# Patient Record
Sex: MALE | Race: WHITE | NOT HISPANIC OR LATINO | Employment: OTHER | URBAN - METROPOLITAN AREA
[De-identification: names, ages, dates, MRNs, and addresses within clinical notes are randomized per-mention and may not be internally consistent; named-entity substitution may affect disease eponyms.]

---

## 2017-01-10 DIAGNOSIS — D64.9 ANEMIA, UNSPECIFIED: ICD-10-CM

## 2017-01-10 DIAGNOSIS — C95.10 CHRONIC LEUKEMIA OF UNSPECIFIED CELL TYPE NOT HAVING ACHIEVED REMISSION (HCC): Primary | ICD-10-CM

## 2017-01-10 RX ORDER — WARFARIN SODIUM 2.5 MG/1
TABLET ORAL
Qty: 40 TABLET | Refills: 1 | Status: SHIPPED | OUTPATIENT
Start: 2017-01-10 | End: 2017-03-14

## 2017-01-25 ENCOUNTER — HOSPITAL ENCOUNTER (OUTPATIENT)
Dept: CARDIOLOGY | Facility: HOSPITAL | Age: 82
Setting detail: RECURRING SERIES
Discharge: HOME | End: 2017-01-25

## 2017-01-25 PROCEDURE — 36416 COLLJ CAPILLARY BLOOD SPEC: CPT | Performed by: INTERNAL MEDICINE

## 2017-01-25 PROCEDURE — 85610 PROTHROMBIN TIME: CPT | Performed by: INTERNAL MEDICINE

## 2017-02-06 ENCOUNTER — APPOINTMENT (OUTPATIENT)
Dept: ONCOLOGY | Facility: CLINIC | Age: 82
End: 2017-02-06

## 2017-02-06 ENCOUNTER — APPOINTMENT (OUTPATIENT)
Dept: LAB | Facility: HOSPITAL | Age: 82
End: 2017-02-06

## 2017-02-07 RX ORDER — TRAMADOL HYDROCHLORIDE 50 MG/1
TABLET ORAL
Qty: 120 TABLET | Refills: 1 | OUTPATIENT
Start: 2017-02-07 | End: 2017-04-30 | Stop reason: SDUPTHER

## 2017-02-14 ENCOUNTER — LAB (OUTPATIENT)
Dept: LAB | Facility: HOSPITAL | Age: 82
End: 2017-02-14

## 2017-02-14 ENCOUNTER — OFFICE VISIT (OUTPATIENT)
Dept: ONCOLOGY | Facility: CLINIC | Age: 82
End: 2017-02-14

## 2017-02-14 VITALS
TEMPERATURE: 97.5 F | DIASTOLIC BLOOD PRESSURE: 92 MMHG | BODY MASS INDEX: 27.35 KG/M2 | SYSTOLIC BLOOD PRESSURE: 170 MMHG | RESPIRATION RATE: 14 BRPM | WEIGHT: 170.2 LBS | OXYGEN SATURATION: 97 % | HEIGHT: 66 IN | HEART RATE: 67 BPM

## 2017-02-14 DIAGNOSIS — D64.9 ANEMIA, UNSPECIFIED: ICD-10-CM

## 2017-02-14 DIAGNOSIS — C95.10 CHRONIC LEUKEMIA OF UNSPECIFIED CELL TYPE NOT HAVING ACHIEVED REMISSION (HCC): Primary | ICD-10-CM

## 2017-02-14 DIAGNOSIS — D69.6 THROMBOCYTOPENIA (HCC): ICD-10-CM

## 2017-02-14 DIAGNOSIS — C95.10 CHRONIC LEUKEMIA OF UNSPECIFIED CELL TYPE NOT HAVING ACHIEVED REMISSION (HCC): ICD-10-CM

## 2017-02-14 DIAGNOSIS — N18.30 ANEMIA OF CHRONIC RENAL FAILURE, STAGE 3 (MODERATE) (HCC): ICD-10-CM

## 2017-02-14 DIAGNOSIS — E53.8 VITAMIN B12 DEFICIENCY: ICD-10-CM

## 2017-02-14 DIAGNOSIS — D63.1 ANEMIA OF CHRONIC RENAL FAILURE, STAGE 3 (MODERATE) (HCC): ICD-10-CM

## 2017-02-14 LAB
BASOPHILS # BLD AUTO: 0.05 10*3/MM3 (ref 0–0.1)
BASOPHILS NFR BLD AUTO: 0.5 % (ref 0–1.1)
DEPRECATED RDW RBC AUTO: 48.6 FL (ref 37–49)
EOSINOPHIL # BLD AUTO: 0.26 10*3/MM3 (ref 0–0.36)
EOSINOPHIL NFR BLD AUTO: 2.8 % (ref 1–5)
ERYTHROCYTE [DISTWIDTH] IN BLOOD BY AUTOMATED COUNT: 13.5 % (ref 11.7–14.5)
FERRITIN SERPL-MCNC: 431.3 NG/ML (ref 30–400)
FOLATE SERPL-MCNC: 10.2 NG/ML
HCT VFR BLD AUTO: 38.3 % (ref 40–49)
HGB BLD-MCNC: 12.8 G/DL (ref 13.5–16.5)
IMM GRANULOCYTES # BLD: 0.03 10*3/MM3 (ref 0–0.03)
IMM GRANULOCYTES NFR BLD: 0.3 % (ref 0–0.5)
IRON 24H UR-MRATE: 96 MCG/DL (ref 59–158)
IRON SATN MFR SERPL: 32 % (ref 14–48)
LYMPHOCYTES # BLD AUTO: 2.14 10*3/MM3 (ref 1–3.5)
LYMPHOCYTES NFR BLD AUTO: 22.9 % (ref 20–49)
MCH RBC QN AUTO: 32.4 PG (ref 27–33)
MCHC RBC AUTO-ENTMCNC: 33.4 G/DL (ref 32–35)
MCV RBC AUTO: 97 FL (ref 83–97)
MONOCYTES # BLD AUTO: 0.73 10*3/MM3 (ref 0.25–0.8)
MONOCYTES NFR BLD AUTO: 7.8 % (ref 4–12)
NEUTROPHILS # BLD AUTO: 6.12 10*3/MM3 (ref 1.5–7)
NEUTROPHILS NFR BLD AUTO: 65.7 % (ref 39–75)
NRBC BLD MANUAL-RTO: 0 /100 WBC (ref 0–0)
PLATELET # BLD AUTO: 134 10*3/MM3 (ref 150–375)
PMV BLD AUTO: 10 FL (ref 8.9–12.1)
RBC # BLD AUTO: 3.95 10*6/MM3 (ref 4.3–5.5)
TIBC SERPL-MCNC: 301 MCG/DL (ref 249–505)
TRANSFERRIN SERPL-MCNC: 215 MG/DL (ref 200–360)
VIT B12 BLD-MCNC: 372 PG/ML (ref 250–999)
WBC NRBC COR # BLD: 9.33 10*3/MM3 (ref 4–10)

## 2017-02-14 PROCEDURE — 82728 ASSAY OF FERRITIN: CPT | Performed by: INTERNAL MEDICINE

## 2017-02-14 PROCEDURE — 82746 ASSAY OF FOLIC ACID SERUM: CPT | Performed by: INTERNAL MEDICINE

## 2017-02-14 PROCEDURE — 82607 VITAMIN B-12: CPT | Performed by: INTERNAL MEDICINE

## 2017-02-14 PROCEDURE — 85025 COMPLETE CBC W/AUTO DIFF WBC: CPT | Performed by: INTERNAL MEDICINE

## 2017-02-14 PROCEDURE — 99213 OFFICE O/P EST LOW 20 MIN: CPT | Performed by: INTERNAL MEDICINE

## 2017-02-14 PROCEDURE — 83540 ASSAY OF IRON: CPT | Performed by: INTERNAL MEDICINE

## 2017-02-14 PROCEDURE — 84466 ASSAY OF TRANSFERRIN: CPT | Performed by: INTERNAL MEDICINE

## 2017-02-14 NOTE — PROGRESS NOTES
REASON FOR FOLLOWUP:   1. Early stage chronic lymphocytic leukemia, on observation.   2. The patient is here for evaluation on 04/02/2015, because of outside laboratory study on 03/27/2015 reported elevated serum ferritin level at 503.5 ng/mL and iron saturation 26%. The patient tested negative for HFE gene mutation in April 2015.   3. Mild anemia and borderline platelets.   4. Laboratory study on 01/18/2016 reported slightly improved ferritin level at 351, normal iron saturation 25%.       HISTORY OF PRESENT ILLNESS: Mr. Ventura is an 86-year-old  male returning today for annual reevaluation. The patient reports he is at baseline condition.  Performance status is ECOG 1.  Denies any fainting, lightheadedness, etc. Denies any bleeding issues. No fever, sweating or chills. No recurrent infection.       Laboratory study today, reported improved stable CBC showed hemoglobin at 12.8, with MCV 97, platelets 134,000, WBC 9330, including neutrophil count 6120, lymphocytes 2140, monocytes 730.       PAST MEDICAL HISTORY:   1.  Hypertension  2.  Hyperlipidemia.  3.  gastroesophageal reflex disease.  4.  Chronic renal insufficiency.  Stage III.   5.  Atrial fibrillation on Coumadin.   6.  Sick sinus syndrome post- pacemaker placement    PAST SURGICAL HISTORY:   1. Cholecystectomy in 1983.   2. Umbilical hernia repair performed laparoscopically by Dr. Garcia in 2006.   3. Pacemaker placement in August 2015       HEMATOLOGIC/ONCOLOGIC HISTORY: History from previous dates can be found in the separate document.       The patient was retested on 01/18/2016. Laboratory study showed hemoglobin 13.1, MCV 98.8, MCHC 32.9 , platelets 142,000, WBC 9520 including neutrophils 6460, lymphocytes 1890 and monocytes 820. Chemistry labs reported normal liver function panel, creatinine baseline 1.2 and normal electrolytes except potassium was 5.2. Glucose was 120. Iron panel showed improved ferritin at 351, iron saturation 25.3%, B12  "level was 454 pg/mL and folic acid 10.5 ng/mL. The patient was examined on 2016 and no palpable adenopathy. The patient denies fever, sweating, etc.       MEDICATIONS: The current medication list was reviewed with the patient and updated in the EMR this date per the medical assistant. Medication dosages and frequencies were confirmed to be accurate.       ALLERGIES: No known drug allergies.       SOCIAL HISTORY: . Retired from sales for [employer]. Quit smoking over 30 years ago. Daily alcohol daily, reporting 28 drinks a week. He stays fairly active for his age walking and golfing.       FAMILY HISTORY: Three uncles on his father's side developed liver cancer at ages 55, 74 and 79.       REVIEW OF SYSTEMS:    PAIN: See VITAL SIGNS below.   GENERAL: No change in appetite or weight, no fevers, chills or sweats.   SKIN: No rashes or nonhealing lesions.   HEME/LYMPH: See hematologic history.   EYES: No vision changes or diplopia.   ENT: No tinnitus, hearing loss, gum bleeding, epistaxis, hoarseness or dysphagia.   RESPIRATORY: No cough, shortness of breath, hemoptysis or wheezing.   CVS: No chest pain, palpitations, orthopnea, dyspnea on exertion or PND.   GI: No abdominal pain, nausea, vomiting, constipation, diarrhea, melena or hematochezia.   : No lower tract obstructive symptoms, no dysuria or hematuria.    MUSCULOSKELETAL: The patient has chronic joint pain.   NEUROLOGICAL:  No dizziness, global weakness, loss of consciousness or seizures.   PSYCHIATRIC:  No increased nervousness, mood changes or depression.       VITAL SIGNS:   Vitals:    17 1338   BP: 170/92   Pulse: 67   Resp: 14   Temp: 97.5 °F (36.4 °C)   TempSrc: Oral   SpO2: 97%   Weight: 170 lb 3.2 oz (77.2 kg)   Height: 65.95\" (167.5 cm)  Comment: new height   PainSc: 0-No pain   ECO       PHYSICAL EXAMINATION:   GENERAL:  Well-developed elderly gentleman fitting with his age, not in acute distress.   SKIN:  Warm and dry " without rashes, purpura or petechiae.   HEAD:  Normocephalic.   EYES:  Pupils equal, round and reactive to light.  EOMs intact.  Conjunctivae normal.   EARS:  Hearing intact.   NOSE:  No excoriations or nasal discharge.   MOUTH:  Tongue is well papillated; no stomatitis or ulcers.  Lips normal.   THROAT:  Oropharynx without lesions or exudates.   NECK:  Supple with good range of motion; no thyromegaly or masses.   LYMPHATICS:  No cervical, supraclavicular, axillary or inguinal adenopathy.   CHEST:  Lungs breathing sounds has decreased but no wheezing, no crackles.   CARDIAC:  Irregular rhythm rate controled without murmurs, rubs or gallops.   ABDOMEN:  Soft, nontender with no organomegaly or masses. Bowel sounds present.   EXTREMITIES:  No clubbing, cyanosis or edema.   NEURO:  No focal deficits.       LABORATORY DATA:  Lab Results   Component Value Date    WBC 9.33 02/14/2017    HGB 12.8 (L) 02/14/2017    HCT 38.3 (L) 02/14/2017    MCV 97.0 02/14/2017     (L) 02/14/2017     Lab Results   Component Value Date    NEUTROABS 6.12 02/14/2017     Lymphocytes 2140.     Lab Results   Component Value Date    GLUCOSE 103 (H) 08/28/2016    BUN 24 (H) 10/17/2016    CREATININE 1.28 (H) 10/17/2016    EGFRIFNONA 53 (L) 10/17/2016    EGFRIFAFRI 65 10/17/2016    BCR 18.8 10/17/2016    CO2 32.0 (H) 10/17/2016    CALCIUM 9.7 10/17/2016    PROTENTOTREF 6.9 10/17/2016    ALBUMIN 4.40 10/17/2016    LABIL2 1.8 10/17/2016    AST 22 10/17/2016    ALT 16 10/17/2016       Lab Results   Component Value Date    IRON 96 02/14/2017    TIBC 301 02/14/2017    FERRITIN 431.30 (H) 02/14/2017       Iron saturation 32%    ASSESSMENT:   1. Chronic lymphocytic leukemia, stage 0. No evidence for disease progress by laboratory study and physical examination.   2. Elevated serum ferritin level and tested negative for HFE gene mutation.  Continues to have elevated ferritin.  This could be inflammatory.  His iron saturation is normal 32%.  Considering  his anemia, there is no need of for phlebotomy.   3. Mild anemia, probably multifactorial, in the setting of stage III chronic renal insufficiency, and inflammatory condition.  Hemoglobin is relatively stable, asymptomatic. Continue followup.   4. Mild thrombocytopenia, with some fluctuation.  Patient is asymptomatic.   5. Suboptimal vitamin B12 level,  repeated level is pending. Suspect the patient may have functional B12 deficiency. Recommend the patient to buy over-the-counter vitamin B12, 1000 mcg daily.       PLAN: I will bring the patient back in 12 months for reevaluation.         KAIA CARMONA M.D. , Ph.D.         cc:  NARINDER Schaefer       Addendum:   Lab Results   Component Value Date    FOLATE 10.20 02/14/2017     Lab Results   Component Value Date    CGPMXKNP72 372 02/14/2017     Laboratory results came back, he has worsening vitamin B12 level.  I called and left message for him asking him to take oral vitamin B12 supplementation use 7929-5946 MCG daily.      KAIA CARMONA M.D. , Ph.D.   02/16/17       Dragon disclaimer:  Much of this encounter note is an electronic transcription/translation of spoken language to printed text. The electronic translation of spoken language may permit erroneous, or at times, nonsensical words or phrases to be inadvertently transcribed; Although I have reviewed the note for such errors, some may still exist.

## 2017-02-16 PROBLEM — D63.1 ANEMIA OF CHRONIC RENAL FAILURE, STAGE 3 (MODERATE) (HCC): Status: ACTIVE | Noted: 2017-02-16

## 2017-02-16 PROBLEM — E53.8 VITAMIN B12 DEFICIENCY: Status: ACTIVE | Noted: 2017-02-16

## 2017-02-16 PROBLEM — D69.6 THROMBOCYTOPENIA (HCC): Status: ACTIVE | Noted: 2017-02-16

## 2017-02-16 PROBLEM — N18.30 ANEMIA OF CHRONIC RENAL FAILURE, STAGE 3 (MODERATE) (HCC): Status: ACTIVE | Noted: 2017-02-16

## 2017-02-23 ENCOUNTER — HOSPITAL ENCOUNTER (OUTPATIENT)
Dept: CARDIOLOGY | Facility: HOSPITAL | Age: 82
Setting detail: RECURRING SERIES
Discharge: HOME | End: 2017-02-23

## 2017-02-23 PROCEDURE — 36416 COLLJ CAPILLARY BLOOD SPEC: CPT

## 2017-02-23 PROCEDURE — 85610 PROTHROMBIN TIME: CPT

## 2017-03-14 ENCOUNTER — OFFICE VISIT (OUTPATIENT)
Dept: CARDIOLOGY | Facility: CLINIC | Age: 82
End: 2017-03-14

## 2017-03-14 ENCOUNTER — CLINICAL SUPPORT NO REQUIREMENTS (OUTPATIENT)
Dept: CARDIOLOGY | Facility: CLINIC | Age: 82
End: 2017-03-14

## 2017-03-14 VITALS
WEIGHT: 172 LBS | SYSTOLIC BLOOD PRESSURE: 142 MMHG | HEIGHT: 67 IN | BODY MASS INDEX: 27 KG/M2 | DIASTOLIC BLOOD PRESSURE: 80 MMHG | HEART RATE: 62 BPM

## 2017-03-14 DIAGNOSIS — I25.10 CORONARY ARTERY DISEASE INVOLVING NATIVE CORONARY ARTERY OF NATIVE HEART WITHOUT ANGINA PECTORIS: Primary | ICD-10-CM

## 2017-03-14 DIAGNOSIS — I10 ESSENTIAL HYPERTENSION: ICD-10-CM

## 2017-03-14 DIAGNOSIS — I48.20 CHRONIC ATRIAL FIBRILLATION (HCC): Primary | ICD-10-CM

## 2017-03-14 DIAGNOSIS — C95.10 CHRONIC LEUKEMIA OF UNSPECIFIED CELL TYPE NOT HAVING ACHIEVED REMISSION (HCC): ICD-10-CM

## 2017-03-14 DIAGNOSIS — I48.19 PERSISTENT ATRIAL FIBRILLATION (HCC): ICD-10-CM

## 2017-03-14 PROCEDURE — 93288 INTERROG EVL PM/LDLS PM IP: CPT | Performed by: INTERNAL MEDICINE

## 2017-03-14 PROCEDURE — 93000 ELECTROCARDIOGRAM COMPLETE: CPT | Performed by: INTERNAL MEDICINE

## 2017-03-14 PROCEDURE — 99214 OFFICE O/P EST MOD 30 MIN: CPT | Performed by: INTERNAL MEDICINE

## 2017-03-14 NOTE — PROGRESS NOTES
Date of Office Visit: 2017  Encounter Provider: Nidhi Juarez MD  Place of Service: [facility] CARDIOLOGY  Patient Name: Harry Ventura  :1930    Chief complaint      History of Present Illness  Patient is an 86-year-old gentleman with history of hypertension, hyperlipidemia, deep venous thrombosis, chronic lymphocytic leukemia, atrial fibrillation with sick sinus syndrome and pacemaker placement.  He also has a history of pulmonary hypertension.  In  he was admitted with medical noncompliance and dietary noncompliance and heart failure.  He was noted to have pulmonary hypertension however by 2015 this had progressed with an RV systolic pressure of 67 mmHg in the setting of diastolic dysfunction.  In 2015 he developed bradycardia and had a pacemaker placed.  By 2016.  Normal left dripper size and systolic function with mild aortic regurgitation mild to moderate tricuspid regurgitation with an RV systolic pressure 68 mmHg.  Right heart catheterization revealed significant diastolic dysfunction and elevated biventricular pressures with mild pulmonary hypertension and mild coronary artery disease.  Diuretics were were felt to be treatment of choice.    Since last visit he has chronic dyspnea on exertion is unchanged but otherwise feels relatively well he is slightly less active.  But he does ambulate around his home and go shopping.  He denies any palpitations chest pain syncope near syncope.  His blood pressure home he believes his as it is today.  He believes is to see Dr. Grace in the next several weeks.  He has not had any falls and remains on Coumadin.  He is also taking aspirin.  When he was here at the last visit in September his blood pressure was quite high.  I have started him on lisinopril though he did not start this.    Past Medical History   Diagnosis Date   • Accelerated essential hypertension    • Anemia      Mild   • Atrial fibrillation    •  CAP (community acquired pneumonia)    • CHF (congestive heart failure)    • Chronic anticoagulation    • Chronic diastolic CHF (congestive heart failure)    • CLL (chronic lymphocytic leukemia)      Stage 0   • Coronary artery disease    • ED (erectile dysfunction) of non-organic origin    • Former smoker    • Gait instability    • GERD (gastroesophageal reflux disease)    • Glucose intolerance (malabsorption)    • H/O Thrombocytopenia      Mild   • Hematuria    • History of EKG 08/28/2016   • Hypercholesterolemia    • Hypertension      Pulmonary   • Hypogonadism male    • Insomnia    • Iron overload    • Low back pain    • Macrocytosis    • Personal history of CLL (chronic lymphocytic leukemia)    • Personal history of venous thrombosis and embolism    • Proteinuria    • Renal insufficiency    • Seborrheic dermatitis      Past Surgical History   Procedure Laterality Date   • Cholecystectomy  1983   • Insert / replace / remove pacemaker     • Cardiac catheterization N/A 6/17/2016     Procedure: Right and Left Heart Cath;  Surgeon: Harry Bolton MD;  Location: [facility] CATH INVASIVE LOCATION;  Service:    • Cardiac catheterization N/A 6/17/2016     Procedure: Coronary angiography;  Surgeon: Harry Bolton MD;  Location: [facility] CATH INVASIVE LOCATION;  Service:    • Cardiac catheterization N/A 6/17/2016     Procedure: Left Heart Cath;  Surgeon: Harry Bolton MD;  Location: [facility] CATH INVASIVE LOCATION;  Service:    • Cardiac catheterization N/A 6/17/2016     Procedure: Left ventriculography;  Surgeon: Harry Bolton MD;  Location: [facility] CATH INVASIVE LOCATION;  Service:    • Umbilical hernia repair  2006     Performed laparoscopically by Dr. Garcia.     Outpatient Medications Prior to Visit   Medication Sig Dispense Refill   • atorvastatin (LIPITOR) 40 MG tablet Take 1 tablet by mouth Daily. 30 tablet 5   • clotrimazole-betamethasone (LOTRISONE) cream Apply topically as needed. Apply inch. PRN     • furosemide (LASIX) 40 MG tablet  Take 40 mg by mouth every morning.     • metoprolol tartrate (LOPRESSOR) 25 MG tablet Take 1 tablet by mouth 2 (Two) Times a Day. 60 tablet 1   • omeprazole (priLOSEC) 20 MG capsule Take 1 capsule by mouth Daily. 30 capsule 5   • potassium chloride (K-DUR,KLOR-CON) 20 MEQ CR tablet Take 1 tablet by mouth Daily. 90 tablet 1   • traMADol (ULTRAM) 50 MG tablet TAKE 2 TABLETS BY MOUTH IN THE MORNING and 2 tabs in the evening 120 tablet 1   • warfarin (COUMADIN) 2.5 MG tablet Take 2.5 mg by mouth Daily. Take as directed     • aspirin 81 MG tablet Take 81 mg by mouth daily.     • JANTOVEN 2.5 MG tablet TAKE 1 TABLET BY MOUTH ONE TIME A DAY ON WEDNeSDAYs AND 1.5 tablets ON ALL OTHER DAYS 40 tablet 1   • lisinopril (ZESTRIL) 2.5 MG tablet Take 1 tablet by mouth daily. 90 tablet 3     No facility-administered medications prior to visit.      Allergies as of 03/14/2017   • (No Known Allergies)     Social History     Social History   • Marital status:      Spouse name: Elena   • Number of children: N/A   • Years of education: N/A     Occupational History   • Retired      Worked in sales for [employer].     Social History Main Topics   • Smoking status: Former Smoker   • Smokeless tobacco: Not on file      Comment: Quit smoking over 30 years ago. //  caffeine use   • Alcohol use Yes      Comment: Three drinks a day wine or vodka, reporting 28 drinks a week.    • Drug use: No   • Sexual activity: Defer     Other Topics Concern   • Not on file     Social History Narrative    Stays fairly active for his age, walking and golfing.      Family History   Problem Relation Age of Onset   • Emphysema Father    • Emphysema Sister    • Coronary artery disease Other    • No Known Problems Mother    • Liver cancer Paternal Uncle 55   • Liver cancer Paternal Uncle 74   • Liver cancer Paternal Uncle 79     Review of Systems   Constitution: Negative for fever, malaise/fatigue, weight gain and weight loss.   HENT: Negative for ear  "pain, hearing loss, nosebleeds and sore throat.    Eyes: Negative for double vision, pain, vision loss in left eye and vision loss in right eye.   Cardiovascular:        See history of present illness.   Respiratory: Negative for cough, shortness of breath, sleep disturbances due to breathing, snoring and wheezing.    Endocrine: Negative for cold intolerance, heat intolerance and polyuria.   Skin: Negative for itching, poor wound healing and rash.   Musculoskeletal: Negative for joint pain, joint swelling and myalgias.   Gastrointestinal: Negative for abdominal pain, diarrhea, hematochezia, nausea and vomiting.   Genitourinary: Negative for hematuria and hesitancy.   Neurological: Negative for numbness, paresthesias and seizures.   Psychiatric/Behavioral: Negative for depression. The patient is not nervous/anxious.      Objective:     Vitals:    03/14/17 0929   BP: 142/80   Pulse: 62   Weight: 172 lb (78 kg)   Height: 67\" (170.2 cm)     Body mass index is 26.94 kg/(m^2).    Physical Exam   Constitutional: He is oriented to person, place, and time. He appears well-developed and well-nourished.   HENT:   Head: Normocephalic.   Nose: Nose normal.   Mouth/Throat: Oropharynx is clear and moist.   Eyes: Conjunctivae and EOM are normal. Pupils are equal, round, and reactive to light. Right eye exhibits no discharge. No scleral icterus.   Neck: Normal range of motion. Neck supple. No JVD present. No thyromegaly present.   Cardiovascular: Normal rate, regular rhythm, normal heart sounds and intact distal pulses.  Exam reveals no gallop and no friction rub.    No murmur heard.  Pulses:       Carotid pulses are 2+ on the right side, and 2+ on the left side.       Radial pulses are 2+ on the right side, and 2+ on the left side.        Femoral pulses are 2+ on the right side, and 2+ on the left side.       Popliteal pulses are 2+ on the right side, and 2+ on the left side.        Dorsalis pedis pulses are 2+ on the right side, " and 2+ on the left side.        Posterior tibial pulses are 2+ on the right side, and 2+ on the left side.   Pulmonary/Chest: Effort normal and breath sounds normal. No respiratory distress. He has no wheezes. He has no rales.   Abdominal: Soft. Bowel sounds are normal. He exhibits no distension. There is no hepatosplenomegaly. There is no tenderness. There is no rebound.   Musculoskeletal: Normal range of motion. He exhibits no edema or tenderness.   Neurological: He is alert and oriented to person, place, and time.   Skin: Skin is warm and dry. No rash noted. No erythema.   Psychiatric: He has a normal mood and affect. His behavior is normal. Judgment and thought content normal.   Vitals reviewed.    Lab Review:     ECG 12 Lead  Date/Time: 3/14/2017 8:06 PM  Performed by: HARMAN TAMAYO  Authorized by: HARMAN TAMAYO   Comparison: compared with previous ECG   Similar to previous ECG  Rhythm: atrial fibrillation  Conduction comments: Nonspecific ST T wave changes  QTc = 480 msec  Clinical impression: abnormal ECG          Assessment:       Diagnosis Plan   1. Coronary artery disease involving native coronary artery of native heart without angina pectoris     2. Essential hypertension     3. Persistent atrial fibrillation     4. Chronic leukemia of unspecified cell type not having achieved remission       Plan:       1.  Dyspnea on exertion.  He states this is questionably worse.  She resisted decreased activity and plans on increasing his excise regimen now that the weather has improved.  Will not pursue additional testing at this time unless his dyspnea worsens  2.  Hypertension.  He will call me in another 4 weeks with a blood pressure update after his increase his excise regimen.  He will not resume lisinopril.  Will also follow-up with Dr. Pagan for routine blood work is the near future  3.  Persistent atrial fibrillation device check today reveals no interim events.  We'll continue with warfarin.  Will discontinue  aspirin  4.  Mild coronary artery disease.  He has no anginal symptoms.  We'll discontinue aspirin as above  5.  Sick sinus syndrome stenosis pacemaker placement continue routine device check    Atrial Fibrillation and Atrial Flutter  Assessment  • The patient has persistent atrial fibrillation  • This is non-valvular in etiology  • The patient's CHADS2-VASc score is 4  • A [id number] score of 2 or more is considered a high risk for a thromboembolic event  • Warfarin prescribed  • Dabigatran not prescribed  • Rivaroxaban not prescribed  • Apixaban not prescribed  • Aspirin not prescribed    Plan  • Continue in atrial fibrillation with rate control  • Continue warfarin for antithrombotic therapy, bleeding issues discussed  • Continue beta blocker for rhythm control       Harry Ventura   Home Medication Instructions ECTOR:    Printed on:03/14/17 2009   Medication Information                      atorvastatin (LIPITOR) 40 MG tablet  Take 1 tablet by mouth Daily.             clotrimazole-betamethasone (LOTRISONE) cream  Apply topically as needed. Apply inch. PRN             furosemide (LASIX) 40 MG tablet  Take 40 mg by mouth every morning.             metoprolol tartrate (LOPRESSOR) 25 MG tablet  Take 1 tablet by mouth 2 (Two) Times a Day.             omeprazole (priLOSEC) 20 MG capsule  Take 1 capsule by mouth Daily.             potassium chloride (K-DUR,KLOR-CON) 20 MEQ CR tablet  Take 1 tablet by mouth Daily.             traMADol (ULTRAM) 50 MG tablet  TAKE 2 TABLETS BY MOUTH IN THE MORNING and 2 tabs in the evening             warfarin (COUMADIN) 2.5 MG tablet  Take 2.5 mg by mouth Daily. Take as directed               EMR Dragon/Transcription disclaimer:   Much of this encounter note is an electronic transcription/translation of spoken language to printed text. The electronic translation of spoken language may permit erroneous, or at times, nonsensical words or phrases to be inadvertently transcribed; Although I  have reviewed the note for such errors, some may still exist.

## 2017-03-20 RX ORDER — WARFARIN SODIUM 2.5 MG/1
TABLET ORAL
Qty: 40 TABLET | Refills: 0 | Status: SHIPPED | OUTPATIENT
Start: 2017-03-20 | End: 2017-03-22 | Stop reason: SDUPTHER

## 2017-03-23 RX ORDER — WARFARIN SODIUM 2.5 MG/1
TABLET ORAL
Qty: 135 TABLET | Refills: 0 | Status: SHIPPED | OUTPATIENT
Start: 2017-03-23 | End: 2017-06-29 | Stop reason: SDUPTHER

## 2017-03-29 ENCOUNTER — HOSPITAL ENCOUNTER (OUTPATIENT)
Dept: CARDIOLOGY | Facility: HOSPITAL | Age: 82
Setting detail: RECURRING SERIES
Discharge: HOME | End: 2017-03-29

## 2017-03-29 PROCEDURE — 36416 COLLJ CAPILLARY BLOOD SPEC: CPT

## 2017-03-29 PROCEDURE — 85610 PROTHROMBIN TIME: CPT

## 2017-04-26 ENCOUNTER — HOSPITAL ENCOUNTER (OUTPATIENT)
Dept: CARDIOLOGY | Facility: HOSPITAL | Age: 82
Setting detail: RECURRING SERIES
Discharge: HOME | End: 2017-04-26

## 2017-04-26 PROCEDURE — 85610 PROTHROMBIN TIME: CPT

## 2017-04-26 PROCEDURE — 36416 COLLJ CAPILLARY BLOOD SPEC: CPT

## 2017-05-02 RX ORDER — TRAMADOL HYDROCHLORIDE 50 MG/1
TABLET ORAL
Qty: 120 TABLET | Refills: 0 | OUTPATIENT
Start: 2017-05-02 | End: 2017-09-12 | Stop reason: SDUPTHER

## 2017-05-11 DIAGNOSIS — E78.5 HYPERLIPIDEMIA, UNSPECIFIED HYPERLIPIDEMIA TYPE: ICD-10-CM

## 2017-05-11 DIAGNOSIS — I10 ESSENTIAL HYPERTENSION: Primary | ICD-10-CM

## 2017-05-15 ENCOUNTER — RESULTS ENCOUNTER (OUTPATIENT)
Dept: FAMILY MEDICINE CLINIC | Facility: CLINIC | Age: 82
End: 2017-05-15

## 2017-05-15 DIAGNOSIS — E78.5 HYPERLIPIDEMIA, UNSPECIFIED HYPERLIPIDEMIA TYPE: ICD-10-CM

## 2017-05-15 DIAGNOSIS — I10 ESSENTIAL HYPERTENSION: ICD-10-CM

## 2017-05-16 LAB
ALBUMIN SERPL-MCNC: 4 G/DL (ref 3.5–5.2)
ALBUMIN/GLOB SERPL: 1.3 G/DL
ALP SERPL-CCNC: 70 U/L (ref 39–117)
ALT SERPL-CCNC: 20 U/L (ref 1–41)
AST SERPL-CCNC: 23 U/L (ref 1–40)
BILIRUB SERPL-MCNC: 0.6 MG/DL (ref 0.1–1.2)
BUN SERPL-MCNC: 18 MG/DL (ref 8–23)
BUN/CREAT SERPL: 16.4 (ref 7–25)
CALCIUM SERPL-MCNC: 11.5 MG/DL (ref 8.6–10.5)
CHLORIDE SERPL-SCNC: 99 MMOL/L (ref 98–107)
CHOLEST SERPL-MCNC: 160 MG/DL (ref 0–200)
CHOLEST/HDLC SERPL: 2.5 {RATIO}
CO2 SERPL-SCNC: 29.1 MMOL/L (ref 22–29)
CREAT SERPL-MCNC: 1.1 MG/DL (ref 0.76–1.27)
GLOBULIN SER CALC-MCNC: 3.2 GM/DL
GLUCOSE SERPL-MCNC: 105 MG/DL (ref 65–99)
HDLC SERPL-MCNC: 64 MG/DL (ref 40–60)
LDLC SERPL CALC-MCNC: 78 MG/DL (ref 0–100)
POTASSIUM SERPL-SCNC: 4.9 MMOL/L (ref 3.5–5.2)
PROT SERPL-MCNC: 7.2 G/DL (ref 6–8.5)
SODIUM SERPL-SCNC: 141 MMOL/L (ref 136–145)
TRIGL SERPL-MCNC: 88 MG/DL (ref 0–150)
VLDLC SERPL CALC-MCNC: 17.6 MG/DL (ref 5–40)

## 2017-05-16 RX ORDER — FUROSEMIDE 40 MG/1
TABLET ORAL
Qty: 60 TABLET | Refills: 2 | Status: SHIPPED | OUTPATIENT
Start: 2017-05-16 | End: 2017-05-17 | Stop reason: SDUPTHER

## 2017-05-17 ENCOUNTER — OFFICE VISIT (OUTPATIENT)
Dept: FAMILY MEDICINE CLINIC | Facility: CLINIC | Age: 82
End: 2017-05-17

## 2017-05-17 VITALS
HEIGHT: 67 IN | OXYGEN SATURATION: 98 % | BODY MASS INDEX: 27.15 KG/M2 | HEART RATE: 68 BPM | TEMPERATURE: 97.8 F | SYSTOLIC BLOOD PRESSURE: 120 MMHG | DIASTOLIC BLOOD PRESSURE: 80 MMHG | WEIGHT: 173 LBS

## 2017-05-17 DIAGNOSIS — K21.9 GASTROESOPHAGEAL REFLUX DISEASE WITHOUT ESOPHAGITIS: ICD-10-CM

## 2017-05-17 DIAGNOSIS — E78.00 PURE HYPERCHOLESTEROLEMIA: ICD-10-CM

## 2017-05-17 DIAGNOSIS — E83.52 HYPERCALCEMIA: ICD-10-CM

## 2017-05-17 DIAGNOSIS — I10 ESSENTIAL HYPERTENSION: Primary | ICD-10-CM

## 2017-05-17 DIAGNOSIS — S32.010A CLOSED COMPRESSION FRACTURE OF L1 LUMBAR VERTEBRAL BODY: ICD-10-CM

## 2017-05-17 DIAGNOSIS — G89.4 CHRONIC PAIN SYNDROME: ICD-10-CM

## 2017-05-17 PROCEDURE — 99214 OFFICE O/P EST MOD 30 MIN: CPT | Performed by: NURSE PRACTITIONER

## 2017-05-17 RX ORDER — FUROSEMIDE 40 MG/1
40 TABLET ORAL DAILY
Qty: 90 TABLET | Refills: 2 | Status: SHIPPED | OUTPATIENT
Start: 2017-05-17 | End: 2017-10-25 | Stop reason: SDUPTHER

## 2017-05-17 RX ORDER — HYDROCORTISONE VALERATE 2 MG/G
OINTMENT TOPICAL 2 TIMES DAILY
Qty: 60 G | Refills: 2 | Status: SHIPPED | OUTPATIENT
Start: 2017-05-17 | End: 2017-10-24 | Stop reason: ALTCHOICE

## 2017-05-17 RX ORDER — CLOBETASOL PROPIONATE 0.5 MG/G
OINTMENT TOPICAL 2 TIMES DAILY
Qty: 60 G | Refills: 2 | Status: SHIPPED | OUTPATIENT
Start: 2017-05-17 | End: 2017-10-24 | Stop reason: ALTCHOICE

## 2017-05-18 LAB
CA-I SERPL ISE-MCNC: 5.8 MG/DL (ref 4.5–5.6)
PTH-INTACT SERPL-MCNC: 52 PG/ML (ref 15–65)

## 2017-05-19 ENCOUNTER — TELEPHONE (OUTPATIENT)
Dept: FAMILY MEDICINE CLINIC | Facility: CLINIC | Age: 82
End: 2017-05-19

## 2017-05-19 LAB
Lab: NORMAL
Lab: NORMAL

## 2017-05-22 ENCOUNTER — OFFICE VISIT (OUTPATIENT)
Dept: FAMILY MEDICINE CLINIC | Facility: CLINIC | Age: 82
End: 2017-05-22

## 2017-05-22 VITALS
DIASTOLIC BLOOD PRESSURE: 78 MMHG | HEART RATE: 60 BPM | OXYGEN SATURATION: 99 % | HEIGHT: 67 IN | SYSTOLIC BLOOD PRESSURE: 122 MMHG | BODY MASS INDEX: 26.53 KG/M2 | TEMPERATURE: 98.2 F | WEIGHT: 169 LBS

## 2017-05-22 DIAGNOSIS — E21.3 HYPERPARATHYROIDISM (HCC): Primary | ICD-10-CM

## 2017-05-22 PROCEDURE — 99213 OFFICE O/P EST LOW 20 MIN: CPT | Performed by: NURSE PRACTITIONER

## 2017-05-24 LAB
25(OH)D3+25(OH)D2 SERPL-MCNC: 42.1 NG/ML (ref 30–100)
WRITTEN AUTHORIZATION: NORMAL

## 2017-05-26 ENCOUNTER — TELEPHONE (OUTPATIENT)
Dept: FAMILY MEDICINE CLINIC | Facility: CLINIC | Age: 82
End: 2017-05-26

## 2017-05-26 ENCOUNTER — TELEPHONE (OUTPATIENT)
Dept: CARDIOLOGY | Facility: CLINIC | Age: 82
End: 2017-05-26

## 2017-05-27 ENCOUNTER — HOSPITAL ENCOUNTER (EMERGENCY)
Facility: HOSPITAL | Age: 82
Discharge: HOME | End: 2017-05-27
Attending: EMERGENCY MEDICINE | Admitting: EMERGENCY MEDICINE

## 2017-05-27 ENCOUNTER — APPOINTMENT (OUTPATIENT)
Dept: CARDIOLOGY | Facility: HOSPITAL | Age: 82
End: 2017-05-27
Attending: EMERGENCY MEDICINE

## 2017-05-27 VITALS
SYSTOLIC BLOOD PRESSURE: 173 MMHG | TEMPERATURE: 97.2 F | OXYGEN SATURATION: 97 % | BODY MASS INDEX: 27.28 KG/M2 | HEIGHT: 68 IN | HEART RATE: 77 BPM | DIASTOLIC BLOOD PRESSURE: 74 MMHG | RESPIRATION RATE: 16 BRPM | WEIGHT: 180 LBS

## 2017-05-27 DIAGNOSIS — M79.89 RIGHT LEG SWELLING: Primary | ICD-10-CM

## 2017-05-27 LAB
ALBUMIN SERPL-MCNC: 4.2 G/DL (ref 3.5–5.2)
ALBUMIN/GLOB SERPL: 1.2 G/DL
ALP SERPL-CCNC: 84 U/L (ref 39–117)
ALT SERPL W P-5'-P-CCNC: 20 U/L (ref 1–41)
ANION GAP SERPL CALCULATED.3IONS-SCNC: 13.6 MMOL/L
AST SERPL-CCNC: 26 U/L (ref 1–40)
BASOPHILS # BLD AUTO: 0.04 10*3/MM3 (ref 0–0.2)
BASOPHILS NFR BLD AUTO: 0.4 % (ref 0–1.5)
BH CV LOW VAS RIGHT DISTAL FEMORAL SPONT: 1
BH CV LOW VAS RIGHT LESSER SAPH VESSEL: 1
BH CV LOW VAS RIGHT MID FEMORAL SPONT: 1
BH CV LOW VAS RIGHT POPLITEAL SPONT: 1
BH CV LOW VAS RIGHT PROXIMAL FEMORAL SPONT: 1
BH CV LOWER VASCULAR LEFT COMMON FEMORAL AUGMENT: NORMAL
BH CV LOWER VASCULAR LEFT COMMON FEMORAL COMPETENT: NORMAL
BH CV LOWER VASCULAR LEFT COMMON FEMORAL COMPRESS: NORMAL
BH CV LOWER VASCULAR LEFT COMMON FEMORAL PHASIC: NORMAL
BH CV LOWER VASCULAR LEFT COMMON FEMORAL SPONT: NORMAL
BH CV LOWER VASCULAR RIGHT COMMON FEMORAL AUGMENT: NORMAL
BH CV LOWER VASCULAR RIGHT COMMON FEMORAL COMPETENT: NORMAL
BH CV LOWER VASCULAR RIGHT COMMON FEMORAL COMPRESS: NORMAL
BH CV LOWER VASCULAR RIGHT COMMON FEMORAL PHASIC: NORMAL
BH CV LOWER VASCULAR RIGHT COMMON FEMORAL SPONT: NORMAL
BH CV LOWER VASCULAR RIGHT DISTAL FEMORAL COMPRESS: NORMAL
BH CV LOWER VASCULAR RIGHT DISTAL FEMORAL THROMBUS: NORMAL
BH CV LOWER VASCULAR RIGHT GASTRONEMIUS COMPRESS: NORMAL
BH CV LOWER VASCULAR RIGHT GREATER SAPH AK COMPRESS: NORMAL
BH CV LOWER VASCULAR RIGHT GREATER SAPH BK COMPRESS: NORMAL
BH CV LOWER VASCULAR RIGHT LESSER SAPH COMPRESS: NORMAL
BH CV LOWER VASCULAR RIGHT LESSER SAPH THROMBUS: NORMAL
BH CV LOWER VASCULAR RIGHT MID FEMORAL AUGMENT: NORMAL
BH CV LOWER VASCULAR RIGHT MID FEMORAL COMPETENT: NORMAL
BH CV LOWER VASCULAR RIGHT MID FEMORAL COMPRESS: NORMAL
BH CV LOWER VASCULAR RIGHT MID FEMORAL PHASIC: NORMAL
BH CV LOWER VASCULAR RIGHT MID FEMORAL SPONT: NORMAL
BH CV LOWER VASCULAR RIGHT MID FEMORAL THROMBUS: NORMAL
BH CV LOWER VASCULAR RIGHT PERONEAL COMPRESS: NORMAL
BH CV LOWER VASCULAR RIGHT POPLITEAL AUGMENT: NORMAL
BH CV LOWER VASCULAR RIGHT POPLITEAL COMPETENT: NORMAL
BH CV LOWER VASCULAR RIGHT POPLITEAL COMPRESS: NORMAL
BH CV LOWER VASCULAR RIGHT POPLITEAL PHASIC: NORMAL
BH CV LOWER VASCULAR RIGHT POPLITEAL SPONT: NORMAL
BH CV LOWER VASCULAR RIGHT POPLITEAL THROMBUS: NORMAL
BH CV LOWER VASCULAR RIGHT POSTERIOR TIBIAL COMPRESS: NORMAL
BH CV LOWER VASCULAR RIGHT PROXIMAL FEMORAL COMPRESS: NORMAL
BH CV LOWER VASCULAR RIGHT PROXIMAL FEMORAL THROMBUS: NORMAL
BH CV LOWER VASCULAR RIGHT SAPHENOFEMORAL JUNCTION AUGMENT: NORMAL
BH CV LOWER VASCULAR RIGHT SAPHENOFEMORAL JUNCTION COMPETENT: NORMAL
BH CV LOWER VASCULAR RIGHT SAPHENOFEMORAL JUNCTION COMPRESS: NORMAL
BH CV LOWER VASCULAR RIGHT SAPHENOFEMORAL JUNCTION PHASIC: NORMAL
BH CV LOWER VASCULAR RIGHT SAPHENOFEMORAL JUNCTION SPONT: NORMAL
BILIRUB SERPL-MCNC: 0.6 MG/DL (ref 0.1–1.2)
BUN BLD-MCNC: 22 MG/DL (ref 8–23)
BUN/CREAT SERPL: 16.9 (ref 7–25)
CALCIUM SPEC-SCNC: 9.8 MG/DL (ref 8.6–10.5)
CHLORIDE SERPL-SCNC: 99 MMOL/L (ref 98–107)
CO2 SERPL-SCNC: 29.4 MMOL/L (ref 22–29)
CREAT BLD-MCNC: 1.3 MG/DL (ref 0.76–1.27)
DEPRECATED RDW RBC AUTO: 48.4 FL (ref 37–54)
EOSINOPHIL # BLD AUTO: 0.28 10*3/MM3 (ref 0–0.7)
EOSINOPHIL NFR BLD AUTO: 3 % (ref 0.3–6.2)
ERYTHROCYTE [DISTWIDTH] IN BLOOD BY AUTOMATED COUNT: 13.5 % (ref 11.5–14.5)
GFR SERPL CREATININE-BSD FRML MDRD: 52 ML/MIN/1.73
GLOBULIN UR ELPH-MCNC: 3.6 GM/DL
GLUCOSE BLD-MCNC: 115 MG/DL (ref 65–99)
HCT VFR BLD AUTO: 38.3 % (ref 40.4–52.2)
HGB BLD-MCNC: 13 G/DL (ref 13.7–17.6)
IMM GRANULOCYTES # BLD: 0 10*3/MM3 (ref 0–0.03)
IMM GRANULOCYTES NFR BLD: 0 % (ref 0–0.5)
INR PPP: 2.35 (ref 0.9–1.1)
LYMPHOCYTES # BLD AUTO: 2.25 10*3/MM3 (ref 0.9–4.8)
LYMPHOCYTES NFR BLD AUTO: 23.9 % (ref 19.6–45.3)
MCH RBC QN AUTO: 33.4 PG (ref 27–32.7)
MCHC RBC AUTO-ENTMCNC: 33.9 G/DL (ref 32.6–36.4)
MCV RBC AUTO: 98.5 FL (ref 79.8–96.2)
MONOCYTES # BLD AUTO: 0.76 10*3/MM3 (ref 0.2–1.2)
MONOCYTES NFR BLD AUTO: 8.1 % (ref 5–12)
NEUTROPHILS # BLD AUTO: 6.08 10*3/MM3 (ref 1.9–8.1)
NEUTROPHILS NFR BLD AUTO: 64.6 % (ref 42.7–76)
PLATELET # BLD AUTO: 150 10*3/MM3 (ref 140–500)
PMV BLD AUTO: 10.5 FL (ref 6–12)
POTASSIUM BLD-SCNC: 4.4 MMOL/L (ref 3.5–5.2)
PROT SERPL-MCNC: 7.8 G/DL (ref 6–8.5)
PROTHROMBIN TIME: 24.9 SECONDS (ref 11.7–14.2)
RBC # BLD AUTO: 3.89 10*6/MM3 (ref 4.6–6)
SODIUM BLD-SCNC: 142 MMOL/L (ref 136–145)
WBC NRBC COR # BLD: 9.41 10*3/MM3 (ref 4.5–10.7)

## 2017-05-27 PROCEDURE — 85610 PROTHROMBIN TIME: CPT | Performed by: EMERGENCY MEDICINE

## 2017-05-27 PROCEDURE — 99283 EMERGENCY DEPT VISIT LOW MDM: CPT

## 2017-05-27 PROCEDURE — 80053 COMPREHEN METABOLIC PANEL: CPT | Performed by: EMERGENCY MEDICINE

## 2017-05-27 PROCEDURE — 85025 COMPLETE CBC W/AUTO DIFF WBC: CPT | Performed by: EMERGENCY MEDICINE

## 2017-05-27 PROCEDURE — 93971 EXTREMITY STUDY: CPT

## 2017-05-31 ENCOUNTER — HOSPITAL ENCOUNTER (OUTPATIENT)
Dept: CARDIOLOGY | Facility: HOSPITAL | Age: 82
Setting detail: RECURRING SERIES
Discharge: HOME | End: 2017-05-31

## 2017-05-31 PROCEDURE — 36416 COLLJ CAPILLARY BLOOD SPEC: CPT

## 2017-05-31 PROCEDURE — 85610 PROTHROMBIN TIME: CPT

## 2017-06-01 ENCOUNTER — TELEPHONE (OUTPATIENT)
Dept: SOCIAL WORK | Facility: HOSPITAL | Age: 82
End: 2017-06-01

## 2017-06-01 NOTE — TELEPHONE ENCOUNTER
ER F/U phone call:   Pt states that he is doing well. Denies the need to see his PCP at this time. No other questions or concerns voiced at this time. Kamala Lopez RN

## 2017-06-12 RX ORDER — VALACYCLOVIR HYDROCHLORIDE 500 MG/1
TABLET, FILM COATED ORAL
Qty: 6 TABLET | Refills: 0 | Status: SHIPPED | OUTPATIENT
Start: 2017-06-12 | End: 2017-07-29 | Stop reason: SDUPTHER

## 2017-06-21 ENCOUNTER — CLINICAL SUPPORT NO REQUIREMENTS (OUTPATIENT)
Dept: CARDIOLOGY | Facility: CLINIC | Age: 82
End: 2017-06-21

## 2017-06-21 ENCOUNTER — HOSPITAL ENCOUNTER (OUTPATIENT)
Dept: CARDIOLOGY | Facility: HOSPITAL | Age: 82
Setting detail: RECURRING SERIES
Discharge: HOME | End: 2017-06-21

## 2017-06-21 DIAGNOSIS — I48.20 CHRONIC ATRIAL FIBRILLATION (HCC): Primary | ICD-10-CM

## 2017-06-21 PROCEDURE — 93288 INTERROG EVL PM/LDLS PM IP: CPT | Performed by: INTERNAL MEDICINE

## 2017-06-21 PROCEDURE — 85610 PROTHROMBIN TIME: CPT

## 2017-06-21 PROCEDURE — 36416 COLLJ CAPILLARY BLOOD SPEC: CPT

## 2017-06-29 RX ORDER — WARFARIN SODIUM 2.5 MG/1
TABLET ORAL
Qty: 40 TABLET | Refills: 0 | Status: SHIPPED | OUTPATIENT
Start: 2017-06-29 | End: 2017-07-20 | Stop reason: SDUPTHER

## 2017-07-03 ENCOUNTER — HOSPITAL ENCOUNTER (OUTPATIENT)
Dept: CARDIOLOGY | Facility: HOSPITAL | Age: 82
Setting detail: RECURRING SERIES
Discharge: HOME | End: 2017-07-03

## 2017-07-03 PROCEDURE — 36416 COLLJ CAPILLARY BLOOD SPEC: CPT

## 2017-07-03 PROCEDURE — 85610 PROTHROMBIN TIME: CPT

## 2017-07-13 RX ORDER — OMEPRAZOLE 20 MG/1
CAPSULE, DELAYED RELEASE ORAL
Qty: 30 CAPSULE | Refills: 4 | Status: SHIPPED | OUTPATIENT
Start: 2017-07-13 | End: 2017-12-13 | Stop reason: SDUPTHER

## 2017-07-19 ENCOUNTER — HOSPITAL ENCOUNTER (OUTPATIENT)
Dept: CARDIOLOGY | Facility: HOSPITAL | Age: 82
Setting detail: RECURRING SERIES
Discharge: HOME | End: 2017-07-19

## 2017-07-19 PROCEDURE — 36416 COLLJ CAPILLARY BLOOD SPEC: CPT

## 2017-07-19 PROCEDURE — 85610 PROTHROMBIN TIME: CPT

## 2017-07-20 RX ORDER — WARFARIN SODIUM 2.5 MG/1
TABLET ORAL
Qty: 40 TABLET | Refills: 0 | Status: SHIPPED | OUTPATIENT
Start: 2017-07-20 | End: 2017-08-17 | Stop reason: SDUPTHER

## 2017-07-25 ENCOUNTER — TELEPHONE (OUTPATIENT)
Dept: CARDIOLOGY | Facility: CLINIC | Age: 82
End: 2017-07-25

## 2017-07-25 RX ORDER — METOPROLOL SUCCINATE 50 MG/1
50 TABLET, EXTENDED RELEASE ORAL 2 TIMES DAILY
Qty: 60 TABLET | Refills: 5 | Status: SHIPPED | OUTPATIENT
Start: 2017-07-25 | End: 2018-02-24 | Stop reason: SDUPTHER

## 2017-07-25 NOTE — TELEPHONE ENCOUNTER
Pt states that he has changed the batteries in his machine at home. He is still getting bp readings of   172/84  160/76  165/79  168/86  182/79    He wanted to know if his medicine should be increased at this time? No new complaints

## 2017-07-25 NOTE — TELEPHONE ENCOUNTER
Find out what is his heart rate.  As long as heart rate has been consistently above 60 beats minute, increase lopressor to 50 mg BID.mkd

## 2017-07-31 RX ORDER — VALACYCLOVIR HYDROCHLORIDE 500 MG/1
TABLET, FILM COATED ORAL
Qty: 6 TABLET | Refills: 3 | Status: SHIPPED | OUTPATIENT
Start: 2017-07-31 | End: 2017-12-13 | Stop reason: SDUPTHER

## 2017-08-14 DIAGNOSIS — I10 UNSPECIFIED ESSENTIAL HYPERTENSION: ICD-10-CM

## 2017-08-14 DIAGNOSIS — E78.5 HYPERLIPIDEMIA, UNSPECIFIED HYPERLIPIDEMIA TYPE: Primary | ICD-10-CM

## 2017-08-15 LAB
ALBUMIN SERPL-MCNC: 4.1 G/DL (ref 3.5–5.2)
ALBUMIN/GLOB SERPL: 1.3 G/DL
ALP SERPL-CCNC: 78 U/L (ref 39–117)
ALT SERPL-CCNC: 17 U/L (ref 1–41)
APPEARANCE UR: CLEAR
AST SERPL-CCNC: 25 U/L (ref 1–40)
BACTERIA #/AREA URNS HPF: ABNORMAL /HPF
BILIRUB SERPL-MCNC: 0.4 MG/DL (ref 0.1–1.2)
BILIRUB UR QL STRIP: NEGATIVE
BUN SERPL-MCNC: 22 MG/DL (ref 8–23)
BUN/CREAT SERPL: 18.3 (ref 7–25)
CALCIUM SERPL-MCNC: 9.3 MG/DL (ref 8.6–10.5)
CASTS URNS MICRO: ABNORMAL
CHLORIDE SERPL-SCNC: 99 MMOL/L (ref 98–107)
CHOLEST SERPL-MCNC: 186 MG/DL (ref 0–200)
CHOLEST/HDLC SERPL: 2.09 {RATIO}
CO2 SERPL-SCNC: 30.8 MMOL/L (ref 22–29)
COLOR UR: ABNORMAL
CREAT SERPL-MCNC: 1.2 MG/DL (ref 0.76–1.27)
EPI CELLS #/AREA URNS HPF: ABNORMAL /HPF
GLOBULIN SER CALC-MCNC: 3.2 GM/DL
GLUCOSE SERPL-MCNC: 113 MG/DL (ref 65–99)
GLUCOSE UR QL: NEGATIVE
HDLC SERPL-MCNC: 89 MG/DL (ref 40–60)
HGB UR QL STRIP: NEGATIVE
KETONES UR QL STRIP: ABNORMAL
LDLC SERPL CALC-MCNC: 82 MG/DL (ref 0–100)
LEUKOCYTE ESTERASE UR QL STRIP: ABNORMAL
NITRITE UR QL STRIP: NEGATIVE
PH UR STRIP: 5.5 [PH] (ref 5–8)
POTASSIUM SERPL-SCNC: 5.3 MMOL/L (ref 3.5–5.2)
PROT SERPL-MCNC: 7.3 G/DL (ref 6–8.5)
PROT UR QL STRIP: ABNORMAL
RBC #/AREA URNS HPF: ABNORMAL /HPF
SODIUM SERPL-SCNC: 144 MMOL/L (ref 136–145)
SP GR UR: 1.02 (ref 1–1.03)
TRIGL SERPL-MCNC: 75 MG/DL (ref 0–150)
UROBILINOGEN UR STRIP-MCNC: ABNORMAL MG/DL
VLDLC SERPL CALC-MCNC: 15 MG/DL (ref 5–40)
WBC #/AREA URNS HPF: ABNORMAL /HPF

## 2017-08-16 ENCOUNTER — OFFICE VISIT (OUTPATIENT)
Dept: FAMILY MEDICINE CLINIC | Facility: CLINIC | Age: 82
End: 2017-08-16

## 2017-08-16 VITALS
SYSTOLIC BLOOD PRESSURE: 132 MMHG | OXYGEN SATURATION: 98 % | HEIGHT: 68 IN | BODY MASS INDEX: 25.01 KG/M2 | WEIGHT: 165 LBS | TEMPERATURE: 98.3 F | DIASTOLIC BLOOD PRESSURE: 78 MMHG | HEART RATE: 63 BPM

## 2017-08-16 DIAGNOSIS — K21.9 GASTROESOPHAGEAL REFLUX DISEASE WITHOUT ESOPHAGITIS: ICD-10-CM

## 2017-08-16 DIAGNOSIS — Z79.01 CHRONIC ANTICOAGULATION: ICD-10-CM

## 2017-08-16 DIAGNOSIS — I48.91 ATRIAL FIBRILLATION, UNSPECIFIED TYPE (HCC): ICD-10-CM

## 2017-08-16 DIAGNOSIS — E78.00 PURE HYPERCHOLESTEROLEMIA: ICD-10-CM

## 2017-08-16 DIAGNOSIS — S32.010A CLOSED COMPRESSION FRACTURE OF L1 LUMBAR VERTEBRAL BODY: ICD-10-CM

## 2017-08-16 DIAGNOSIS — G89.4 CHRONIC PAIN SYNDROME: ICD-10-CM

## 2017-08-16 DIAGNOSIS — I10 ESSENTIAL HYPERTENSION: Primary | ICD-10-CM

## 2017-08-16 DIAGNOSIS — E87.5 HYPERKALEMIA: ICD-10-CM

## 2017-08-16 DIAGNOSIS — N18.3 CHRONIC KIDNEY DISEASE (CKD), STAGE 3 (MODERATE): ICD-10-CM

## 2017-08-16 PROBLEM — G45.9 TIA (TRANSIENT ISCHEMIC ATTACK): Status: ACTIVE | Noted: 2017-06-17

## 2017-08-16 PROBLEM — Z95.0 PACEMAKER: Status: ACTIVE | Noted: 2017-08-16

## 2017-08-16 PROBLEM — I27.20 PULMONARY HYPERTENSION (HCC): Status: ACTIVE | Noted: 2017-08-16

## 2017-08-16 LAB — INR PPP: 2.1 (ref 0.9–1.1)

## 2017-08-16 PROCEDURE — 99214 OFFICE O/P EST MOD 30 MIN: CPT | Performed by: NURSE PRACTITIONER

## 2017-08-16 PROCEDURE — 36416 COLLJ CAPILLARY BLOOD SPEC: CPT | Performed by: NURSE PRACTITIONER

## 2017-08-16 PROCEDURE — 85610 PROTHROMBIN TIME: CPT | Performed by: NURSE PRACTITIONER

## 2017-08-16 RX ORDER — POTASSIUM CHLORIDE 750 MG/1
10 TABLET, EXTENDED RELEASE ORAL DAILY
Qty: 30 TABLET | Refills: 1 | Status: SHIPPED | OUTPATIENT
Start: 2017-08-16 | End: 2017-10-25 | Stop reason: SDUPTHER

## 2017-08-16 NOTE — PROGRESS NOTES
Subjective   Harry Ventura is a 86 y.o. male.     History of Present Illness     6 month f/u.    HTN:  Controlled with meds.  HLD:  All levels to goal on atorvastatin 40 mg which he is tolerating.  Compression fx L1/chronic pain syndrome.  Ultra bid is controlling his pain.  CKD:  Renal fxn stable.  Avoiding NSAIDS.  GERD:  sxs controlled with low dose PPI.  Recent labs showed mild hyperkalemia.  He is taking KCL 20 mEq supplements to compensate for potassium loss due to diuretic tx with furosemide.    He is due for an INR check at his cardiologist's office today.  Request INR be done here instead.    He had a few RBCs noted on UA.  I feel certain this is due to anticoagulation tx with warfarin.      The following portions of the patient's history were reviewed and updated as appropriate: allergies, current medications, past family history, past medical history, past social history, past surgical history and problem list.    Review of Systems   Constitutional: Negative.    Cardiovascular: Negative.    Musculoskeletal: Positive for arthralgias, back pain and gait problem.        Using cane for ambulation   All other systems reviewed and are negative.      Objective   Physical Exam   Constitutional: Vital signs are normal. He appears well-developed and well-nourished.   Cardiovascular: Normal rate, regular rhythm, S1 normal, S2 normal and normal heart sounds.    No murmur heard.  Pulses:       Carotid pulses are 2+ on the right side, and 2+ on the left side.  No carotid bruits   Pulmonary/Chest: Effort normal and breath sounds normal.   Musculoskeletal:   Cane for ambulation   Neurological: He is alert.   Psychiatric: He has a normal mood and affect.   Nursing note and vitals reviewed.      Assessment/Plan   Harry was seen today for follow-up.    Diagnoses and all orders for this visit:    Essential hypertension    Pure hypercholesterolemia    Hyperkalemia  -     Cancel: Potassium; Future  -     Potassium;  Future    Chronic pain syndrome    Chronic kidney disease (CKD), stage 3 (moderate)    Closed compression fracture of L1 lumbar vertebral body    Gastroesophageal reflux disease without esophagitis    Atrial fibrillation, unspecified type  -     POCT INR    Chronic anticoagulation  -     POCT INR    Other orders  -     potassium chloride (K-DUR,KLOR-CON) 10 MEQ CR tablet; Take 1 tablet by mouth Daily.        I reviewed his lab results with him.  Decrease KCL to 10 mEq.  RTC 2 weeks to check serum K+.  His INR was 1.2.  He needs to call his cardiologist with result.    RTC 6 months fbw/follow up appt.      25 min spent with >50% spent in counseling and coordination of care.

## 2017-08-17 RX ORDER — WARFARIN SODIUM 2.5 MG/1
TABLET ORAL
Qty: 40 TABLET | Refills: 0 | Status: SHIPPED | OUTPATIENT
Start: 2017-08-17 | End: 2017-09-25 | Stop reason: SDUPTHER

## 2017-08-17 RX ORDER — ATORVASTATIN CALCIUM 40 MG/1
TABLET, FILM COATED ORAL
Qty: 30 TABLET | Refills: 4 | Status: SHIPPED | OUTPATIENT
Start: 2017-08-17 | End: 2017-12-13 | Stop reason: SDUPTHER

## 2017-08-31 ENCOUNTER — RESULTS ENCOUNTER (OUTPATIENT)
Dept: FAMILY MEDICINE CLINIC | Facility: CLINIC | Age: 82
End: 2017-08-31

## 2017-08-31 DIAGNOSIS — E87.5 HYPERKALEMIA: ICD-10-CM

## 2017-09-12 RX ORDER — TRAMADOL HYDROCHLORIDE 50 MG/1
TABLET ORAL
Qty: 120 TABLET | Refills: 0 | OUTPATIENT
Start: 2017-09-12 | End: 2017-12-13 | Stop reason: SDUPTHER

## 2017-09-13 ENCOUNTER — HOSPITAL ENCOUNTER (OUTPATIENT)
Dept: CARDIOLOGY | Facility: HOSPITAL | Age: 82
Setting detail: RECURRING SERIES
Discharge: HOME | End: 2017-09-13

## 2017-09-13 PROCEDURE — 85610 PROTHROMBIN TIME: CPT

## 2017-09-13 PROCEDURE — 36416 COLLJ CAPILLARY BLOOD SPEC: CPT

## 2017-09-25 RX ORDER — WARFARIN SODIUM 2.5 MG/1
TABLET ORAL
Qty: 40 TABLET | Refills: 0 | Status: SHIPPED | OUTPATIENT
Start: 2017-09-25 | End: 2018-02-02 | Stop reason: ALTCHOICE

## 2017-10-02 RX ORDER — WARFARIN SODIUM 2.5 MG/1
TABLET ORAL
Qty: 40 TABLET | Refills: 0 | Status: SHIPPED | OUTPATIENT
Start: 2017-10-02 | End: 2017-10-24 | Stop reason: SDUPTHER

## 2017-10-16 ENCOUNTER — HOSPITAL ENCOUNTER (OUTPATIENT)
Dept: CARDIOLOGY | Facility: HOSPITAL | Age: 82
Setting detail: RECURRING SERIES
Discharge: HOME | End: 2017-10-16

## 2017-10-16 PROCEDURE — 85610 PROTHROMBIN TIME: CPT

## 2017-10-16 PROCEDURE — 36416 COLLJ CAPILLARY BLOOD SPEC: CPT

## 2017-10-24 ENCOUNTER — OFFICE VISIT (OUTPATIENT)
Dept: CARDIOLOGY | Facility: CLINIC | Age: 82
End: 2017-10-24

## 2017-10-24 ENCOUNTER — HOSPITAL ENCOUNTER (OUTPATIENT)
Dept: CARDIOLOGY | Facility: HOSPITAL | Age: 82
Discharge: HOME | End: 2017-10-24
Admitting: INTERNAL MEDICINE

## 2017-10-24 ENCOUNTER — TELEPHONE (OUTPATIENT)
Dept: CARDIOLOGY | Facility: CLINIC | Age: 82
End: 2017-10-24

## 2017-10-24 ENCOUNTER — CLINICAL SUPPORT NO REQUIREMENTS (OUTPATIENT)
Dept: CARDIOLOGY | Facility: CLINIC | Age: 82
End: 2017-10-24

## 2017-10-24 VITALS
HEART RATE: 84 BPM | SYSTOLIC BLOOD PRESSURE: 122 MMHG | HEIGHT: 66 IN | DIASTOLIC BLOOD PRESSURE: 58 MMHG | BODY MASS INDEX: 26.36 KG/M2 | WEIGHT: 164 LBS

## 2017-10-24 DIAGNOSIS — I50.33 ACUTE ON CHRONIC DIASTOLIC CONGESTIVE HEART FAILURE (HCC): Primary | ICD-10-CM

## 2017-10-24 DIAGNOSIS — Z95.0 PACEMAKER: ICD-10-CM

## 2017-10-24 DIAGNOSIS — I50.32 CHRONIC DIASTOLIC CONGESTIVE HEART FAILURE (HCC): Primary | ICD-10-CM

## 2017-10-24 DIAGNOSIS — I27.20 PULMONARY HYPERTENSION (HCC): Primary | ICD-10-CM

## 2017-10-24 DIAGNOSIS — I50.32 CHRONIC DIASTOLIC CONGESTIVE HEART FAILURE (HCC): ICD-10-CM

## 2017-10-24 DIAGNOSIS — I10 ESSENTIAL HYPERTENSION: ICD-10-CM

## 2017-10-24 DIAGNOSIS — I48.20 CHRONIC ATRIAL FIBRILLATION (HCC): Primary | ICD-10-CM

## 2017-10-24 DIAGNOSIS — I25.10 CORONARY ARTERY DISEASE INVOLVING NATIVE CORONARY ARTERY OF NATIVE HEART WITHOUT ANGINA PECTORIS: ICD-10-CM

## 2017-10-24 LAB — NT-PROBNP SERPL-MCNC: 3545 PG/ML (ref 0–1800)

## 2017-10-24 PROCEDURE — 93279 PRGRMG DEV EVAL PM/LDLS PM: CPT | Performed by: INTERNAL MEDICINE

## 2017-10-24 PROCEDURE — 83880 ASSAY OF NATRIURETIC PEPTIDE: CPT | Performed by: INTERNAL MEDICINE

## 2017-10-24 PROCEDURE — 99214 OFFICE O/P EST MOD 30 MIN: CPT | Performed by: INTERNAL MEDICINE

## 2017-10-24 PROCEDURE — 36415 COLL VENOUS BLD VENIPUNCTURE: CPT

## 2017-10-24 PROCEDURE — 36415 COLL VENOUS BLD VENIPUNCTURE: CPT | Performed by: INTERNAL MEDICINE

## 2017-10-24 PROCEDURE — 93000 ELECTROCARDIOGRAM COMPLETE: CPT | Performed by: INTERNAL MEDICINE

## 2017-10-24 NOTE — TELEPHONE ENCOUNTER
Pro BNP elevated to 3545.  We will increase Lasix to 40 mg twice a day and potassium chloride to 10 mEq twice a day and check a BMP in one week.  Please let patient know this.

## 2017-10-24 NOTE — PROGRESS NOTES
Date of Office Visit: 10/24/2017  Encounter Provider: Nidhi Juarez MD  Place of Service: [facility] CARDIOLOGY  Patient Name: Harry Ventura  :1930    Chief complaint  Follow-up of pulmonary hypertension, mitral regurgitation, sick sinus syndrome, atrial fibrillation    History of Present Illness  Patient is an 86-year-old gentleman with history of hypertension, hyperlipidemia, deep venous thrombosis, chronic lymphocytic leukemia, atrial fibrillation with sick sinus syndrome and pacemaker placement.  He also has a history of pulmonary hypertension.  In  he was admitted with medical noncompliance and dietary noncompliance and heart failure.  He was noted to have pulmonary hypertension however by 2015 this had progressed with an RV systolic pressure of 67 mmHg in the setting of diastolic dysfunction.  In 2015 he developed bradycardia and had a pacemaker placed.  By 2016.  Normal left dripper size and systolic function with mild aortic regurgitation mild to moderate tricuspid regurgitation with an RV systolic pressure 68 mmHg.  Right heart catheterization revealed significant diastolic dysfunction and elevated biventricular pressures with mild pulmonary hypertension and mild coronary artery disease.  Diuretics were were felt to be treatment of choice.    Since the last visit he was admitted in 2017 with facial droop and a TIA.  His INR was subtherapeutic at 1.2.  He was given Lovenox until his INR became therapeutic.  He was noted to have a chronic and 2 stenosis on low-dose aspirin and statin therapy were also added.  He was some discussion about changing to Eliquis however the patient did not wish to do so due to the cost of the drug.  An echocardiogram was also performed that revealed normal systolic function with an ejection fraction 63%, mild mitral regurgitation, trivial to mild aortic valve regurgitation and no evidence of intra-atrial shunting with  saline injection.  Creatinine was slightly elevated at 1.3 with a GFR 52.    Since then he has chronic dyspnea on exertion.  It is modest.  Hematemesis to pulmonary hypertension.  He recently saw Dr. Yu occluded in oxygen walk test in his office and the patient was not instructed to go on oxygen therapy.  He denies any palpitations chest pain syncope near syncope.  He does complain of a chronic cough and severe postnasal drip which actually makes him sleep upright at night.  His device was interrogated today revealed atrial fibrillation with ventricular pacing 63% of the time due to high ventricular rate episodes lasting 14 and 17 seconds in duration    Past Medical History:   Diagnosis Date   • Accelerated essential hypertension    • Anemia     Mild   • Atrial fibrillation    • CAP (community acquired pneumonia)    • CHF (congestive heart failure)    • Chronic anticoagulation    • Chronic diastolic CHF (congestive heart failure)    • CLL (chronic lymphocytic leukemia)     Stage 0   • Coronary artery disease    • ED (erectile dysfunction) of non-organic origin    • Former smoker    • Gait instability    • GERD (gastroesophageal reflux disease)    • Glucose intolerance (malabsorption)    • H/O Thrombocytopenia     Mild   • Hematuria    • History of EKG 08/28/2016   • Hypercholesterolemia    • Hypertension     Pulmonary   • Hypogonadism male    • Insomnia    • Iron overload    • Low back pain    • Macrocytosis    • Personal history of CLL (chronic lymphocytic leukemia)    • Personal history of venous thrombosis and embolism    • Proteinuria    • Renal insufficiency    • Seborrheic dermatitis      Past Surgical History:   Procedure Laterality Date   • CARDIAC CATHETERIZATION N/A 6/17/2016    Procedure: Right and Left Heart Cath;  Surgeon: Harry Bolton MD;  Location: [facility] INVASIVE LOCATION;  Service:    • CARDIAC CATHETERIZATION N/A 6/17/2016    Procedure: Coronary angiography;  Surgeon: Harry Bolton MD;   Location:  [facility] CATH INVASIVE LOCATION;  Service:    • CARDIAC CATHETERIZATION N/A 6/17/2016    Procedure: Left Heart Cath;  Surgeon: Harry Bolton MD;  Location: [facility] CATH INVASIVE LOCATION;  Service:    • CARDIAC CATHETERIZATION N/A 6/17/2016    Procedure: Left ventriculography;  Surgeon: Harry Bolton MD;  Location: [facility] CATH INVASIVE LOCATION;  Service:    • CHOLECYSTECTOMY  1983   • INSERT / REPLACE / REMOVE PACEMAKER     • UMBILICAL HERNIA REPAIR  2006    Performed laparoscopically by Dr. Garcia.     Outpatient Medications Prior to Visit   Medication Sig Dispense Refill   • atorvastatin (LIPITOR) 40 MG tablet TAKE 1 TABLET BY MOUTH ONE TIME A DAY  30 tablet 4   • furosemide (LASIX) 40 MG tablet Take 1 tablet by mouth Daily. (Patient taking differently: Take 40 mg by mouth Daily. Sometime he takes a half a pill.) 90 tablet 2   • JANTOVEN 2.5 MG tablet TAKE 1 TABLET BY MOUTH ONE TIME A DAY ON WEDNESDAY, AND TAKE 1.5 TABS ON ALL OTHER DAYS 40 tablet 0   • metoprolol succinate XL (TOPROL-XL) 50 MG 24 hr tablet Take 1 tablet by mouth 2 (Two) Times a Day. 60 tablet 5   • omeprazole (priLOSEC) 20 MG capsule TAKE 1 CAPSULE BY MOUTH ONE TIME A DAY  30 capsule 4   • potassium chloride (K-DUR,KLOR-CON) 10 MEQ CR tablet Take 1 tablet by mouth Daily. 30 tablet 1   • traMADol (ULTRAM) 50 MG tablet TAKE 2 TABLETS BY MOUTH IN THE MORNING AND 2 TABLETS BY MOUTH IN THE EVENING 120 tablet 0   • valACYclovir (VALTREX) 500 MG tablet TAKE 1 TABLET BY MOUTH TWO TIMES A DAY  6 tablet 3   • clobetasol (TEMOVATE) 0.05 % ointment Apply  topically 2 (Two) Times a Day. 60 g 2   • hydrocortisone valerate (WESTCORT) 0.2 % ointment Apply  topically 2 (Two) Times a Day. 60 g 2   • JANTOVEN 2.5 MG tablet TAKE 1 TABLET BY MOUTH ONE TIME A DAY ON WEDNESDAY, AND TAKE 1.5 TABS ON ALL OTHER DAYS 40 tablet 0     No facility-administered medications prior to visit.      Allergies as of 10/24/2017   • (No Known Allergies)     Social History     Social  History   • Marital status:      Spouse name: Elena   • Number of children: N/A   • Years of education: N/A     Occupational History   • Retired      Worked in sales for [employer].     Social History Main Topics   • Smoking status: Former Smoker   • Smokeless tobacco: Not on file      Comment: Quit smoking over 30 years ago. //  caffeine use   • Alcohol use Yes      Comment: Three drinks a day wine or vodka, reporting 28 drinks a week.    • Drug use: No   • Sexual activity: Defer     Other Topics Concern   • Not on file     Social History Narrative    Stays fairly active for his age, walking and golfing.      Family History   Problem Relation Age of Onset   • Emphysema Father    • Emphysema Sister    • Coronary artery disease Other    • No Known Problems Mother    • Liver cancer Paternal Uncle 55   • Liver cancer Paternal Uncle 74   • Liver cancer Paternal Uncle 79     Review of Systems   Constitution: Positive for malaise/fatigue. Negative for fever, weight gain and weight loss.   HENT: Negative for ear pain, hearing loss, nosebleeds and sore throat.    Eyes: Negative for double vision, pain, vision loss in left eye and vision loss in right eye.   Cardiovascular:        See history of present illness.   Respiratory: Positive for cough and shortness of breath. Negative for sleep disturbances due to breathing, snoring and wheezing.    Endocrine: Negative for cold intolerance, heat intolerance and polyuria.   Skin: Negative for itching, poor wound healing and rash.   Musculoskeletal: Negative for joint pain, joint swelling and myalgias.   Gastrointestinal: Negative for abdominal pain, diarrhea, hematochezia, nausea and vomiting.   Genitourinary: Negative for hematuria and hesitancy.   Neurological: Negative for numbness, paresthesias and seizures.   Psychiatric/Behavioral: Negative for depression. The patient is not nervous/anxious.      Objective:     Vitals:    10/24/17 1341   BP: 122/58   Pulse: 84  "  Weight: 164 lb (74.4 kg)   Height: 66\" (167.6 cm)     Body mass index is 26.47 kg/(m^2).    Physical Exam   Constitutional: He is oriented to person, place, and time. He appears well-developed and well-nourished.   HENT:   Head: Normocephalic.   Nose: Nose normal.   Mouth/Throat: Oropharynx is clear and moist.   Eyes: Conjunctivae and EOM are normal. Pupils are equal, round, and reactive to light. Right eye exhibits no discharge. No scleral icterus.   Neck: Normal range of motion. Neck supple. No JVD present. No thyromegaly present.   Cardiovascular: Normal rate, regular rhythm, normal heart sounds and intact distal pulses.  Exam reveals no gallop and no friction rub.    No murmur heard.  Pulses:       Carotid pulses are 2+ on the right side, and 2+ on the left side.       Radial pulses are 2+ on the right side, and 2+ on the left side.        Femoral pulses are 2+ on the right side, and 2+ on the left side.       Popliteal pulses are 2+ on the right side, and 2+ on the left side.        Dorsalis pedis pulses are 2+ on the right side, and 2+ on the left side.        Posterior tibial pulses are 2+ on the right side, and 2+ on the left side.   1+ edema bilaterally   Pulmonary/Chest: Effort normal and breath sounds normal. No respiratory distress. He has no wheezes. He has no rales.   Abdominal: Soft. Bowel sounds are normal. He exhibits no distension. There is no hepatosplenomegaly. There is no tenderness. There is no rebound.   Musculoskeletal: Normal range of motion. He exhibits no edema or tenderness.   Neurological: He is alert and oriented to person, place, and time.   Skin: Skin is warm and dry. No rash noted. No erythema.   Psychiatric: He has a normal mood and affect. His behavior is normal. Judgment and thought content normal.   Vitals reviewed.    Lab Review:     ECG 12 Lead  Date/Time: 10/24/2017 10:50 PM  Performed by: HARMAN TAMAYO  Authorized by: HARMAN TAMAYO   Comparison: compared with previous ECG "   Rhythm: atrial fibrillation  Rhythm comments: intermittent ventricular pacing  Conduction comments: Nonspecific ST T wave changes  QTc = 488 msec  Other findings: PRWP  Clinical impression: abnormal ECG          Assessment:       Diagnosis Plan   1. Pulmonary hypertension     2. Coronary artery disease involving native coronary artery of native heart without angina pectoris     3. Essential hypertension     4. Pacemaker     5. Chronic diastolic congestive heart failure       Plan:       1.  Dyspnea on exertion.  Still quite significant and with a nocturnal cough.  We'll check a BNP and call Dr. Yu regarding treatment for postnasal drip  2.  Hypertension.  Controlled  3.  Persistent atrial fibrillation, rates under reasonable control  We'll continue with warfarin.   4.  Mild coronary artery disease.  He has no anginal symptoms.  5.  Sick sinus syndrome stenosis pacemaker placement continue routine device check    Atrial Fibrillation and Atrial Flutter  Assessment  • The patient has persistent atrial fibrillation  • This is non-valvular in etiology  • The patient's CHADS2-VASc score is 4  • A [id number] score of 2 or more is considered a high risk for a thromboembolic event  • Warfarin prescribed  • Dabigatran not prescribed  • Rivaroxaban not prescribed  • Apixaban not prescribed  • Aspirin not prescribed    Plan  • Continue in atrial fibrillation with rate control  • Continue warfarin for antithrombotic therapy, bleeding issues discussed  • Continue beta blocker for rhythm control    Addendum: Pro BNP elevated to 3545.  We will increase Lasix to 40 mg twice a day and potassium chloride to 10 mEq twice a day and check a BMP in one week.  Depending on his response may need GUI to further assess mitral regurgitation.       Harry Ventura   Home Medication Instructions ECTOR:    Printed on:10/24/17 3622   Medication Information                      atorvastatin (LIPITOR) 40 MG tablet  TAKE 1 TABLET BY MOUTH ONE  TIME A DAY              furosemide (LASIX) 40 MG tablet  Take 1 tablet by mouth Daily.             JANTOVEN 2.5 MG tablet  TAKE 1 TABLET BY MOUTH ONE TIME A DAY ON WEDNESDAY, AND TAKE 1.5 TABS ON ALL OTHER DAYS             metoprolol succinate XL (TOPROL-XL) 50 MG 24 hr tablet  Take 1 tablet by mouth 2 (Two) Times a Day.             omeprazole (priLOSEC) 20 MG capsule  TAKE 1 CAPSULE BY MOUTH ONE TIME A DAY              potassium chloride (K-DUR,KLOR-CON) 10 MEQ CR tablet  Take 1 tablet by mouth Daily.             traMADol (ULTRAM) 50 MG tablet  TAKE 2 TABLETS BY MOUTH IN THE MORNING AND 2 TABLETS BY MOUTH IN THE EVENING             valACYclovir (VALTREX) 500 MG tablet  TAKE 1 TABLET BY MOUTH TWO TIMES A DAY                Dictated utilizing Dragon dictation

## 2017-10-24 NOTE — ADDENDUM NOTE
Encounter addended by: Heather Burciaga RN on: 10/24/2017  4:10 PM<BR>     Actions taken: Charge Capture section accepted

## 2017-10-25 RX ORDER — POTASSIUM CHLORIDE 750 MG/1
10 TABLET, EXTENDED RELEASE ORAL 2 TIMES DAILY
Qty: 180 TABLET | Refills: 1 | Status: SHIPPED | OUTPATIENT
Start: 2017-10-25 | End: 2018-04-13

## 2017-10-25 RX ORDER — FUROSEMIDE 40 MG/1
40 TABLET ORAL 2 TIMES DAILY
Qty: 180 TABLET | Refills: 1 | Status: SHIPPED | OUTPATIENT
Start: 2017-10-25 | End: 2018-02-02 | Stop reason: SDUPTHER

## 2017-10-30 ENCOUNTER — HOSPITAL ENCOUNTER (OUTPATIENT)
Dept: CARDIOLOGY | Facility: HOSPITAL | Age: 82
Setting detail: RECURRING SERIES
Discharge: HOME | End: 2017-10-30

## 2017-10-30 PROCEDURE — 85610 PROTHROMBIN TIME: CPT

## 2017-10-30 PROCEDURE — 36416 COLLJ CAPILLARY BLOOD SPEC: CPT

## 2017-10-31 ENCOUNTER — TELEPHONE (OUTPATIENT)
Dept: CARDIOLOGY | Facility: HOSPITAL | Age: 82
End: 2017-10-31

## 2017-11-02 ENCOUNTER — LAB (OUTPATIENT)
Dept: LAB | Facility: HOSPITAL | Age: 82
End: 2017-11-02

## 2017-11-02 DIAGNOSIS — I50.33 ACUTE ON CHRONIC DIASTOLIC CONGESTIVE HEART FAILURE (HCC): ICD-10-CM

## 2017-11-02 LAB
ANION GAP SERPL CALCULATED.3IONS-SCNC: 16 MMOL/L
BUN BLD-MCNC: 32 MG/DL (ref 8–23)
BUN/CREAT SERPL: 19.3 (ref 7–25)
CALCIUM SPEC-SCNC: 8.8 MG/DL (ref 8.6–10.5)
CHLORIDE SERPL-SCNC: 94 MMOL/L (ref 98–107)
CO2 SERPL-SCNC: 30 MMOL/L (ref 22–29)
CREAT BLD-MCNC: 1.66 MG/DL (ref 0.76–1.27)
GFR SERPL CREATININE-BSD FRML MDRD: 39 ML/MIN/1.73
GLUCOSE BLD-MCNC: 125 MG/DL (ref 65–99)
POTASSIUM BLD-SCNC: 4 MMOL/L (ref 3.5–5.2)
SODIUM BLD-SCNC: 140 MMOL/L (ref 136–145)

## 2017-11-02 PROCEDURE — 80048 BASIC METABOLIC PNL TOTAL CA: CPT

## 2017-11-02 PROCEDURE — 36415 COLL VENOUS BLD VENIPUNCTURE: CPT

## 2017-11-07 DIAGNOSIS — I50.33 ACUTE ON CHRONIC DIASTOLIC CONGESTIVE HEART FAILURE (HCC): Primary | ICD-10-CM

## 2017-11-08 RX ORDER — WARFARIN SODIUM 2.5 MG/1
3.75 TABLET ORAL DAILY
Qty: 45 TABLET | Refills: 0 | Status: SHIPPED | OUTPATIENT
Start: 2017-11-08 | End: 2018-01-12 | Stop reason: SDUPTHER

## 2017-11-13 ENCOUNTER — HOSPITAL ENCOUNTER (OUTPATIENT)
Dept: CARDIOLOGY | Facility: HOSPITAL | Age: 82
Setting detail: RECURRING SERIES
Discharge: HOME | End: 2017-11-13

## 2017-11-13 DIAGNOSIS — E78.5 HYPERLIPIDEMIA, UNSPECIFIED HYPERLIPIDEMIA TYPE: ICD-10-CM

## 2017-11-13 DIAGNOSIS — I10 ESSENTIAL HYPERTENSION: Primary | ICD-10-CM

## 2017-11-13 PROCEDURE — 36416 COLLJ CAPILLARY BLOOD SPEC: CPT

## 2017-11-13 PROCEDURE — 85610 PROTHROMBIN TIME: CPT

## 2017-11-18 ENCOUNTER — RESULTS ENCOUNTER (OUTPATIENT)
Dept: FAMILY MEDICINE CLINIC | Facility: CLINIC | Age: 82
End: 2017-11-18

## 2017-11-18 DIAGNOSIS — I10 ESSENTIAL HYPERTENSION: ICD-10-CM

## 2017-11-18 DIAGNOSIS — E78.5 HYPERLIPIDEMIA, UNSPECIFIED HYPERLIPIDEMIA TYPE: ICD-10-CM

## 2017-12-11 ENCOUNTER — HOSPITAL ENCOUNTER (OUTPATIENT)
Dept: CARDIOLOGY | Facility: HOSPITAL | Age: 82
Setting detail: RECURRING SERIES
Discharge: HOME | End: 2017-12-11

## 2017-12-11 PROCEDURE — 36416 COLLJ CAPILLARY BLOOD SPEC: CPT

## 2017-12-11 PROCEDURE — 85610 PROTHROMBIN TIME: CPT

## 2017-12-13 RX ORDER — VALACYCLOVIR HYDROCHLORIDE 500 MG/1
TABLET, FILM COATED ORAL
Qty: 6 TABLET | Refills: 5 | Status: SHIPPED | OUTPATIENT
Start: 2017-12-13 | End: 2018-02-02 | Stop reason: SDUPTHER

## 2017-12-13 RX ORDER — OMEPRAZOLE 20 MG/1
CAPSULE, DELAYED RELEASE ORAL
Qty: 30 CAPSULE | Refills: 5 | Status: SHIPPED | OUTPATIENT
Start: 2017-12-13 | End: 2018-02-02 | Stop reason: SDUPTHER

## 2017-12-13 RX ORDER — TRAMADOL HYDROCHLORIDE 50 MG/1
TABLET ORAL
Qty: 120 TABLET | Refills: 0 | OUTPATIENT
Start: 2017-12-13 | End: 2018-02-02 | Stop reason: SDUPTHER

## 2017-12-13 RX ORDER — ATORVASTATIN CALCIUM 40 MG/1
TABLET, FILM COATED ORAL
Qty: 30 TABLET | Refills: 5 | Status: SHIPPED | OUTPATIENT
Start: 2017-12-13 | End: 2018-02-02 | Stop reason: SDUPTHER

## 2018-01-12 RX ORDER — WARFARIN SODIUM 2.5 MG/1
TABLET ORAL
Qty: 45 TABLET | Refills: 0 | Status: SHIPPED | OUTPATIENT
Start: 2018-01-12 | End: 2018-02-02 | Stop reason: SDUPTHER

## 2018-01-25 ENCOUNTER — CLINICAL SUPPORT NO REQUIREMENTS (OUTPATIENT)
Dept: CARDIOLOGY | Facility: CLINIC | Age: 83
End: 2018-01-25

## 2018-01-25 ENCOUNTER — HOSPITAL ENCOUNTER (OUTPATIENT)
Dept: CARDIOLOGY | Facility: HOSPITAL | Age: 83
Setting detail: RECURRING SERIES
Discharge: HOME | End: 2018-01-25

## 2018-01-25 DIAGNOSIS — I48.20 CHRONIC ATRIAL FIBRILLATION (HCC): Primary | ICD-10-CM

## 2018-01-25 PROCEDURE — 85610 PROTHROMBIN TIME: CPT

## 2018-01-25 PROCEDURE — 93288 INTERROG EVL PM/LDLS PM IP: CPT | Performed by: INTERNAL MEDICINE

## 2018-01-25 PROCEDURE — 36416 COLLJ CAPILLARY BLOOD SPEC: CPT

## 2018-01-29 ENCOUNTER — CLINICAL SUPPORT NO REQUIREMENTS (OUTPATIENT)
Dept: CARDIOLOGY | Facility: CLINIC | Age: 83
End: 2018-01-29

## 2018-01-29 DIAGNOSIS — I48.20 CHRONIC ATRIAL FIBRILLATION (HCC): Primary | ICD-10-CM

## 2018-02-02 ENCOUNTER — APPOINTMENT (OUTPATIENT)
Dept: GENERAL RADIOLOGY | Facility: HOSPITAL | Age: 83
End: 2018-02-02

## 2018-02-02 ENCOUNTER — HOSPITAL ENCOUNTER (INPATIENT)
Facility: HOSPITAL | Age: 83
LOS: 4 days | Discharge: HOME | End: 2018-02-06
Attending: EMERGENCY MEDICINE | Admitting: INTERNAL MEDICINE

## 2018-02-02 DIAGNOSIS — I27.20 PULMONARY HYPERTENSION (HCC): ICD-10-CM

## 2018-02-02 DIAGNOSIS — I48.91 ATRIAL FIBRILLATION, UNSPECIFIED TYPE (HCC): ICD-10-CM

## 2018-02-02 DIAGNOSIS — I50.9 CONGESTIVE HEART FAILURE, UNSPECIFIED CONGESTIVE HEART FAILURE CHRONICITY, UNSPECIFIED CONGESTIVE HEART FAILURE TYPE: Primary | ICD-10-CM

## 2018-02-02 DIAGNOSIS — I50.32 CHRONIC DIASTOLIC CONGESTIVE HEART FAILURE (HCC): ICD-10-CM

## 2018-02-02 DIAGNOSIS — J90 PLEURAL EFFUSION, RIGHT: ICD-10-CM

## 2018-02-02 DIAGNOSIS — J18.9 PNEUMONIA OF RIGHT LOWER LOBE DUE TO INFECTIOUS ORGANISM: ICD-10-CM

## 2018-02-02 LAB
ALBUMIN SERPL-MCNC: 3.9 G/DL (ref 3.5–5.2)
ALBUMIN/GLOB SERPL: 1 G/DL
ALP SERPL-CCNC: 113 U/L (ref 39–117)
ALT SERPL W P-5'-P-CCNC: 33 U/L (ref 1–41)
ANION GAP SERPL CALCULATED.3IONS-SCNC: 12.8 MMOL/L
AST SERPL-CCNC: 57 U/L (ref 1–40)
B PERT DNA SPEC QL NAA+PROBE: NOT DETECTED
BASOPHILS # BLD AUTO: 0.04 10*3/MM3 (ref 0–0.2)
BASOPHILS NFR BLD AUTO: 0.3 % (ref 0–1.5)
BILIRUB SERPL-MCNC: 0.6 MG/DL (ref 0.1–1.2)
BUN BLD-MCNC: 19 MG/DL (ref 8–23)
BUN/CREAT SERPL: 16.4 (ref 7–25)
C PNEUM DNA NPH QL NAA+NON-PROBE: NOT DETECTED
CALCIUM SPEC-SCNC: 9.5 MG/DL (ref 8.6–10.5)
CHLORIDE SERPL-SCNC: 101 MMOL/L (ref 98–107)
CO2 SERPL-SCNC: 30.2 MMOL/L (ref 22–29)
CREAT BLD-MCNC: 1.16 MG/DL (ref 0.76–1.27)
D-LACTATE SERPL-SCNC: 2.4 MMOL/L (ref 0.5–2)
DEPRECATED RDW RBC AUTO: 53.9 FL (ref 37–54)
EOSINOPHIL # BLD AUTO: 0.18 10*3/MM3 (ref 0–0.7)
EOSINOPHIL NFR BLD AUTO: 1.2 % (ref 0.3–6.2)
ERYTHROCYTE [DISTWIDTH] IN BLOOD BY AUTOMATED COUNT: 14.4 % (ref 11.5–14.5)
FLUAV H1 2009 PAND RNA NPH QL NAA+PROBE: NOT DETECTED
FLUAV H1 HA GENE NPH QL NAA+PROBE: NOT DETECTED
FLUAV H3 RNA NPH QL NAA+PROBE: NOT DETECTED
FLUAV SUBTYP SPEC NAA+PROBE: NOT DETECTED
FLUBV RNA ISLT QL NAA+PROBE: NOT DETECTED
GFR SERPL CREATININE-BSD FRML MDRD: 60 ML/MIN/1.73
GLOBULIN UR ELPH-MCNC: 3.9 GM/DL
GLUCOSE BLD-MCNC: 181 MG/DL (ref 65–99)
HADV DNA SPEC NAA+PROBE: NOT DETECTED
HCOV 229E RNA SPEC QL NAA+PROBE: NOT DETECTED
HCOV HKU1 RNA SPEC QL NAA+PROBE: NOT DETECTED
HCOV NL63 RNA SPEC QL NAA+PROBE: NOT DETECTED
HCOV OC43 RNA SPEC QL NAA+PROBE: NOT DETECTED
HCT VFR BLD AUTO: 45.1 % (ref 40.4–52.2)
HGB BLD-MCNC: 14.1 G/DL (ref 13.7–17.6)
HMPV RNA NPH QL NAA+NON-PROBE: NOT DETECTED
HPIV1 RNA SPEC QL NAA+PROBE: NOT DETECTED
HPIV2 RNA SPEC QL NAA+PROBE: NOT DETECTED
HPIV3 RNA NPH QL NAA+PROBE: NOT DETECTED
HPIV4 P GENE NPH QL NAA+PROBE: NOT DETECTED
IMM GRANULOCYTES # BLD: 0.03 10*3/MM3 (ref 0–0.03)
IMM GRANULOCYTES NFR BLD: 0.2 % (ref 0–0.5)
INR PPP: 2.15 (ref 0.9–1.1)
LYMPHOCYTES # BLD AUTO: 2.63 10*3/MM3 (ref 0.9–4.8)
LYMPHOCYTES NFR BLD AUTO: 18.2 % (ref 19.6–45.3)
M PNEUMO IGG SER IA-ACNC: NOT DETECTED
MCH RBC QN AUTO: 31.8 PG (ref 27–32.7)
MCHC RBC AUTO-ENTMCNC: 31.3 G/DL (ref 32.6–36.4)
MCV RBC AUTO: 101.6 FL (ref 79.8–96.2)
MONOCYTES # BLD AUTO: 1.01 10*3/MM3 (ref 0.2–1.2)
MONOCYTES NFR BLD AUTO: 7 % (ref 5–12)
NEUTROPHILS # BLD AUTO: 10.59 10*3/MM3 (ref 1.9–8.1)
NEUTROPHILS NFR BLD AUTO: 73.1 % (ref 42.7–76)
NT-PROBNP SERPL-MCNC: 6799 PG/ML (ref 0–1800)
PLATELET # BLD AUTO: 207 10*3/MM3 (ref 140–500)
PMV BLD AUTO: 10.4 FL (ref 6–12)
POTASSIUM BLD-SCNC: 4.8 MMOL/L (ref 3.5–5.2)
PROCALCITONIN SERPL-MCNC: 0.08 NG/ML (ref 0.1–0.25)
PROT SERPL-MCNC: 7.8 G/DL (ref 6–8.5)
PROTHROMBIN TIME: 23.2 SECONDS (ref 11.7–14.2)
RBC # BLD AUTO: 4.44 10*6/MM3 (ref 4.6–6)
RHINOVIRUS RNA SPEC NAA+PROBE: DETECTED
RSV RNA NPH QL NAA+NON-PROBE: NOT DETECTED
SODIUM BLD-SCNC: 144 MMOL/L (ref 136–145)
TROPONIN T SERPL-MCNC: 0.02 NG/ML (ref 0–0.03)
WBC NRBC COR # BLD: 14.48 10*3/MM3 (ref 4.5–10.7)

## 2018-02-02 PROCEDURE — 25010000002 AZITHROMYCIN PER 500 MG: Performed by: PHYSICIAN ASSISTANT

## 2018-02-02 PROCEDURE — 25010000002 CEFTRIAXONE PER 250 MG: Performed by: PHYSICIAN ASSISTANT

## 2018-02-02 PROCEDURE — 85025 COMPLETE CBC W/AUTO DIFF WBC: CPT | Performed by: PHYSICIAN ASSISTANT

## 2018-02-02 PROCEDURE — 93010 ELECTROCARDIOGRAM REPORT: CPT | Performed by: INTERNAL MEDICINE

## 2018-02-02 PROCEDURE — 87798 DETECT AGENT NOS DNA AMP: CPT | Performed by: PHYSICIAN ASSISTANT

## 2018-02-02 PROCEDURE — 99285 EMERGENCY DEPT VISIT HI MDM: CPT

## 2018-02-02 PROCEDURE — 80053 COMPREHEN METABOLIC PANEL: CPT | Performed by: PHYSICIAN ASSISTANT

## 2018-02-02 PROCEDURE — 36415 COLL VENOUS BLD VENIPUNCTURE: CPT

## 2018-02-02 PROCEDURE — 25010000002 FUROSEMIDE PER 20 MG: Performed by: PHYSICIAN ASSISTANT

## 2018-02-02 PROCEDURE — 87486 CHLMYD PNEUM DNA AMP PROBE: CPT | Performed by: PHYSICIAN ASSISTANT

## 2018-02-02 PROCEDURE — 93005 ELECTROCARDIOGRAM TRACING: CPT | Performed by: PHYSICIAN ASSISTANT

## 2018-02-02 PROCEDURE — 87633 RESP VIRUS 12-25 TARGETS: CPT | Performed by: PHYSICIAN ASSISTANT

## 2018-02-02 PROCEDURE — 87147 CULTURE TYPE IMMUNOLOGIC: CPT | Performed by: PHYSICIAN ASSISTANT

## 2018-02-02 PROCEDURE — 83605 ASSAY OF LACTIC ACID: CPT | Performed by: PHYSICIAN ASSISTANT

## 2018-02-02 PROCEDURE — 87581 M.PNEUMON DNA AMP PROBE: CPT | Performed by: PHYSICIAN ASSISTANT

## 2018-02-02 PROCEDURE — 87150 DNA/RNA AMPLIFIED PROBE: CPT | Performed by: PHYSICIAN ASSISTANT

## 2018-02-02 PROCEDURE — 85610 PROTHROMBIN TIME: CPT | Performed by: PHYSICIAN ASSISTANT

## 2018-02-02 PROCEDURE — 84145 PROCALCITONIN (PCT): CPT | Performed by: PHYSICIAN ASSISTANT

## 2018-02-02 PROCEDURE — 71046 X-RAY EXAM CHEST 2 VIEWS: CPT

## 2018-02-02 PROCEDURE — 83880 ASSAY OF NATRIURETIC PEPTIDE: CPT | Performed by: PHYSICIAN ASSISTANT

## 2018-02-02 PROCEDURE — 87040 BLOOD CULTURE FOR BACTERIA: CPT | Performed by: PHYSICIAN ASSISTANT

## 2018-02-02 PROCEDURE — 84484 ASSAY OF TROPONIN QUANT: CPT | Performed by: PHYSICIAN ASSISTANT

## 2018-02-02 RX ORDER — SODIUM CHLORIDE 0.9 % (FLUSH) 0.9 %
1-10 SYRINGE (ML) INJECTION AS NEEDED
Status: DISCONTINUED | OUTPATIENT
Start: 2018-02-02 | End: 2018-02-06 | Stop reason: HOSPADM

## 2018-02-02 RX ORDER — SODIUM CHLORIDE 0.9 % (FLUSH) 0.9 %
10 SYRINGE (ML) INJECTION AS NEEDED
Status: DISCONTINUED | OUTPATIENT
Start: 2018-02-02 | End: 2018-02-06 | Stop reason: HOSPADM

## 2018-02-02 RX ORDER — WARFARIN SODIUM 2.5 MG/1
2.5 TABLET ORAL SEE ADMIN INSTRUCTIONS
COMMUNITY
End: 2018-11-23 | Stop reason: SDUPTHER

## 2018-02-02 RX ORDER — FUROSEMIDE 40 MG/1
40 TABLET ORAL DAILY
COMMUNITY
End: 2018-05-31 | Stop reason: SDUPTHER

## 2018-02-02 RX ORDER — ATORVASTATIN CALCIUM 40 MG/1
40 TABLET, FILM COATED ORAL DAILY
COMMUNITY
End: 2019-02-18 | Stop reason: SDUPTHER

## 2018-02-02 RX ORDER — ASPIRIN 81 MG/1
81 TABLET ORAL DAILY
COMMUNITY
End: 2019-09-03

## 2018-02-02 RX ORDER — TRAMADOL HYDROCHLORIDE 50 MG/1
100 TABLET ORAL 2 TIMES DAILY PRN
COMMUNITY
End: 2018-04-13

## 2018-02-02 RX ORDER — OMEPRAZOLE 20 MG/1
20 CAPSULE, DELAYED RELEASE ORAL DAILY
COMMUNITY
End: 2018-12-13 | Stop reason: SDUPTHER

## 2018-02-02 RX ORDER — CEFTRIAXONE SODIUM 1 G/50ML
1 INJECTION, SOLUTION INTRAVENOUS ONCE
Status: COMPLETED | OUTPATIENT
Start: 2018-02-02 | End: 2018-02-02

## 2018-02-02 RX ORDER — VALACYCLOVIR HYDROCHLORIDE 500 MG/1
500 TABLET, FILM COATED ORAL 2 TIMES DAILY PRN
COMMUNITY
End: 2018-10-30

## 2018-02-02 RX ORDER — FUROSEMIDE 10 MG/ML
40 INJECTION INTRAMUSCULAR; INTRAVENOUS ONCE
Status: COMPLETED | OUTPATIENT
Start: 2018-02-02 | End: 2018-02-02

## 2018-02-02 RX ORDER — ASPIRIN 81 MG/1
81 TABLET, CHEWABLE ORAL DAILY
COMMUNITY
End: 2018-02-02 | Stop reason: SDUPTHER

## 2018-02-02 RX ADMIN — FUROSEMIDE 40 MG: 10 INJECTION, SOLUTION INTRAMUSCULAR; INTRAVENOUS at 22:14

## 2018-02-02 RX ADMIN — AZITHROMYCIN 500 MG: 500 INJECTION, POWDER, LYOPHILIZED, FOR SOLUTION INTRAVENOUS at 23:04

## 2018-02-02 RX ADMIN — CEFTRIAXONE SODIUM 1 G: 1 INJECTION, SOLUTION INTRAVENOUS at 22:28

## 2018-02-03 LAB
D-LACTATE SERPL-SCNC: 1.3 MMOL/L (ref 0.5–2)
HOLD SPECIMEN: NORMAL
INR PPP: 2.29 (ref 0.9–1.1)
PROTHROMBIN TIME: 24.5 SECONDS (ref 11.7–14.2)

## 2018-02-03 PROCEDURE — 83605 ASSAY OF LACTIC ACID: CPT | Performed by: PHYSICIAN ASSISTANT

## 2018-02-03 PROCEDURE — 94799 UNLISTED PULMONARY SVC/PX: CPT

## 2018-02-03 PROCEDURE — 85610 PROTHROMBIN TIME: CPT | Performed by: INTERNAL MEDICINE

## 2018-02-03 RX ORDER — TRAMADOL HYDROCHLORIDE 50 MG/1
100 TABLET ORAL 2 TIMES DAILY
Status: DISCONTINUED | OUTPATIENT
Start: 2018-02-03 | End: 2018-02-06 | Stop reason: HOSPADM

## 2018-02-03 RX ORDER — PANTOPRAZOLE SODIUM 40 MG/1
40 TABLET, DELAYED RELEASE ORAL EVERY MORNING
Status: DISCONTINUED | OUTPATIENT
Start: 2018-02-03 | End: 2018-02-06 | Stop reason: HOSPADM

## 2018-02-03 RX ORDER — METOPROLOL SUCCINATE 50 MG/1
50 TABLET, EXTENDED RELEASE ORAL EVERY 12 HOURS SCHEDULED
Status: DISCONTINUED | OUTPATIENT
Start: 2018-02-03 | End: 2018-02-06 | Stop reason: HOSPADM

## 2018-02-03 RX ORDER — POTASSIUM CHLORIDE 1.5 G/1.77G
20 POWDER, FOR SOLUTION ORAL 2 TIMES DAILY
Status: DISCONTINUED | OUTPATIENT
Start: 2018-02-03 | End: 2018-02-05

## 2018-02-03 RX ORDER — FUROSEMIDE 40 MG/1
40 TABLET ORAL DAILY
Status: DISCONTINUED | OUTPATIENT
Start: 2018-02-03 | End: 2018-02-06 | Stop reason: HOSPADM

## 2018-02-03 RX ORDER — IPRATROPIUM BROMIDE AND ALBUTEROL SULFATE 2.5; .5 MG/3ML; MG/3ML
3 SOLUTION RESPIRATORY (INHALATION) EVERY 6 HOURS PRN
Status: DISCONTINUED | OUTPATIENT
Start: 2018-02-03 | End: 2018-02-06 | Stop reason: HOSPADM

## 2018-02-03 RX ORDER — ATORVASTATIN CALCIUM 20 MG/1
40 TABLET, FILM COATED ORAL DAILY
Status: DISCONTINUED | OUTPATIENT
Start: 2018-02-03 | End: 2018-02-06 | Stop reason: HOSPADM

## 2018-02-03 RX ADMIN — METOPROLOL SUCCINATE 50 MG: 50 TABLET, FILM COATED, EXTENDED RELEASE ORAL at 21:49

## 2018-02-03 RX ADMIN — POTASSIUM CHLORIDE 20 MEQ: 1.5 POWDER, FOR SOLUTION ORAL at 21:49

## 2018-02-03 RX ADMIN — TRAMADOL HYDROCHLORIDE 100 MG: 50 TABLET, FILM COATED ORAL at 11:14

## 2018-02-03 RX ADMIN — METOPROLOL SUCCINATE 50 MG: 50 TABLET, FILM COATED, EXTENDED RELEASE ORAL at 11:15

## 2018-02-03 RX ADMIN — ATORVASTATIN CALCIUM 40 MG: 20 TABLET, FILM COATED ORAL at 11:15

## 2018-02-03 RX ADMIN — IPRATROPIUM BROMIDE AND ALBUTEROL SULFATE 3 ML: .5; 3 SOLUTION RESPIRATORY (INHALATION) at 09:25

## 2018-02-03 RX ADMIN — TRAMADOL HYDROCHLORIDE 100 MG: 50 TABLET, FILM COATED ORAL at 21:49

## 2018-02-03 RX ADMIN — PANTOPRAZOLE SODIUM 40 MG: 40 TABLET, DELAYED RELEASE ORAL at 11:16

## 2018-02-03 RX ADMIN — IPRATROPIUM BROMIDE AND ALBUTEROL SULFATE 3 ML: .5; 3 SOLUTION RESPIRATORY (INHALATION) at 22:33

## 2018-02-03 RX ADMIN — FUROSEMIDE 40 MG: 40 TABLET ORAL at 11:14

## 2018-02-03 NOTE — PLAN OF CARE
Problem: Patient Care Overview (Adult)  Goal: Plan of Care Review  Outcome: Ongoing (interventions implemented as appropriate)   02/03/18 9230   Coping/Psychosocial Response Interventions   Plan Of Care Reviewed With patient   Patient Care Overview   Progress improving   Outcome Evaluation   Outcome Summary/Follow up Plan Daily weights and strict input and output. CHF teaching initiated. Lungs with coarse with rhonchi, crackles and wheezes and times. Monitor lungs, SATs and respirations.      Goal: Adult Individualization and Mutuality  Outcome: Ongoing (interventions implemented as appropriate)    Goal: Discharge Needs Assessment  Outcome: Ongoing (interventions implemented as appropriate)

## 2018-02-03 NOTE — ED PROVIDER NOTES
" EMERGENCY DEPARTMENT ENCOUNTER    CHIEF COMPLAINT  Chief Complaint: Dyspnea  History given by: Spouse  History limited by: Respiratory distress  Room Number: 40/40  PMD: Provider Not In System  Pulmonologist: Dr. Yu    HPI:  Pt is a 87 y.o. male who presents complaining of dyspnea. He has a hx of chronic SOA and CHF which became worse today. Spouse reports that pt was tachypneic with RR of 40-50. Pt called for EMS and was placed on NRB. Pt O2 at that time were 94%. Pt was 80% on RA. Pt reports some mild chest pain. He denies any abd pain, fever, chills. He has had an episode of vomiting. Pt has had productive cough with white frothy sputum.    Duration: chronic, worse today  Onset: sudden  Timing: constant  Quality: \"short of air\"  Intensity/Severity: severe  Progression: worsened  Associated Symptoms: chest pain, cough  Aggravating Factors: none  Alleviating Factors: none  Previous Episodes: hx of CHF and chronic SOA. Sxs are now worse  Treatment before arrival: none    PAST MEDICAL HISTORY  Active Ambulatory Problems     Diagnosis Date Noted   • Atrial fibrillation    • CHF (congestive heart failure)    • Chronic anticoagulation    • GERD (gastroesophageal reflux disease)    • Pure hypercholesterolemia    • Essential hypertension    • Personal history of CLL (chronic lymphocytic leukemia)    • Personal history of venous thrombosis and embolism    • Proteinuria 02/08/2016   • Closed compression fracture of L1 lumbar vertebral body 03/25/2016   • Gait disturbance 03/25/2016   • Chronic pain syndrome 03/25/2016   • Chronic leukemia of unspecified cell type not having achieved remission 01/10/2017   • Anemia of chronic renal failure, stage 3 (moderate) 02/16/2017   • Thrombocytopenia 02/16/2017   • Vitamin B12 deficiency 02/16/2017   • Coronary artery disease involving native coronary artery of native heart without angina pectoris 03/14/2017   • TIA (transient ischemic attack) 06/17/2017   • Pacemaker 08/16/2017 "   • Pulmonary hypertension 08/16/2017     Resolved Ambulatory Problems     Diagnosis Date Noted   • Accelerated essential hypertension    • CAP (community acquired pneumonia)      Past Medical History:   Diagnosis Date   • Accelerated essential hypertension    • Anemia    • Atrial fibrillation    • CAP (community acquired pneumonia)    • CHF (congestive heart failure)    • Chronic anticoagulation    • Chronic diastolic CHF (congestive heart failure)    • CLL (chronic lymphocytic leukemia)    • Coronary artery disease    • ED (erectile dysfunction) of non-organic origin    • Former smoker    • Gait instability    • GERD (gastroesophageal reflux disease)    • Glucose intolerance (malabsorption)    • H/O Thrombocytopenia    • Hematuria    • History of EKG 08/28/2016   • Hypercholesterolemia    • Hypertension    • Hypogonadism male    • Insomnia    • Iron overload    • Low back pain    • Macrocytosis    • Personal history of CLL (chronic lymphocytic leukemia)    • Personal history of venous thrombosis and embolism    • Proteinuria    • Renal insufficiency    • Seborrheic dermatitis        PAST SURGICAL HISTORY  Past Surgical History:   Procedure Laterality Date   • CARDIAC CATHETERIZATION N/A 6/17/2016    Procedure: Right and Left Heart Cath;  Surgeon: Harry Bolton MD;  Location: [facility] CATH INVASIVE LOCATION;  Service:    • CARDIAC CATHETERIZATION N/A 6/17/2016    Procedure: Coronary angiography;  Surgeon: Harry Bolton MD;  Location: [facility] CATH INVASIVE LOCATION;  Service:    • CARDIAC CATHETERIZATION N/A 6/17/2016    Procedure: Left Heart Cath;  Surgeon: Harry Bolton MD;  Location: [facility] CATH INVASIVE LOCATION;  Service:    • CARDIAC CATHETERIZATION N/A 6/17/2016    Procedure: Left ventriculography;  Surgeon: Harry Bolton MD;  Location: [facility] CATH INVASIVE LOCATION;  Service:    • CHOLECYSTECTOMY  1983   • INSERT / REPLACE / REMOVE PACEMAKER     • UMBILICAL HERNIA REPAIR  2006    Performed laparoscopically by Dr. Garcia.        FAMILY HISTORY  Family History   Problem Relation Age of Onset   • Emphysema Father    • Emphysema Sister    • Coronary artery disease Other    • No Known Problems Mother    • Liver cancer Paternal Uncle 55   • Liver cancer Paternal Uncle 74   • Liver cancer Paternal Uncle 79       SOCIAL HISTORY  Social History     Social History   • Marital status:      Spouse name: Elena   • Number of children: N/A   • Years of education: N/A     Occupational History   • Retired      Worked in sales for [employer].     Social History Main Topics   • Smoking status: Former Smoker   • Smokeless tobacco: Not on file      Comment: Quit smoking over 30 years ago. //  caffeine use   • Alcohol use Yes      Comment: Three drinks a day wine or vodka, reporting 28 drinks a week.    • Drug use: No   • Sexual activity: Defer     Other Topics Concern   • Not on file     Social History Narrative    Stays fairly active for his age, walking and golfing.        ALLERGIES  Review of patient's allergies indicates no known allergies.    REVIEW OF SYSTEMS  Review of Systems   Constitutional: Negative for activity change, appetite change and fever.   HENT: Negative for congestion and sore throat.    Eyes: Negative.    Respiratory: Positive for cough (productive) and shortness of breath.    Cardiovascular: Positive for chest pain. Negative for leg swelling.   Gastrointestinal: Negative for abdominal pain, diarrhea and vomiting.   Endocrine: Negative.    Genitourinary: Negative for decreased urine volume and dysuria.   Musculoskeletal: Negative for neck pain.   Skin: Negative for rash and wound.   Allergic/Immunologic: Negative.    Neurological: Negative for weakness, numbness and headaches.   Hematological: Negative.    Psychiatric/Behavioral: Negative.    All other systems reviewed and are negative.      PHYSICAL EXAM  ED Triage Vitals   Temp Heart Rate Resp BP SpO2   02/02/18 2042 02/02/18 2042 02/02/18 2049 02/02/18 2042 02/02/18  2042   97.1 °F (36.2 °C) 99 26 164/89 94 %      Temp src Heart Rate Source Patient Position BP Location FiO2 (%)   02/02/18 2042 -- -- -- --   Temporal Art           Physical Exam   Constitutional: He is oriented to person, place, and time. He appears distressed.   HENT:   Head: Normocephalic and atraumatic.   Eyes: EOM are normal. Pupils are equal, round, and reactive to light.   Neck: Normal range of motion. Neck supple.   Cardiovascular: Normal rate, regular rhythm and intact distal pulses.    Murmur heard.  Pulmonary/Chest: He is in respiratory distress (moderate). He has rhonchi. He has rales.   94% on 15L NC.    Abdominal: Soft. There is no tenderness. There is no rebound and no guarding.   Musculoskeletal: Normal range of motion. He exhibits edema (1+ BLE).   Neurological: He is alert and oriented to person, place, and time. He has normal sensation and normal strength.   Skin: Skin is warm and dry.   Psychiatric: Mood and affect normal.   Nursing note and vitals reviewed.      LAB RESULTS  Lab Results (last 24 hours)     Procedure Component Value Units Date/Time    CBC & Differential [[id number]] Collected:  02/02/18 2107    Specimen:  Blood Updated:  02/02/18 2128    Narrative:       The following orders were created for panel order CBC & Differential.  Procedure                               Abnormality         Status                     ---------                               -----------         ------                     CBC Auto Differential[[id number]]        Abnormal            Final result                 Please view results for these tests on the individual orders.    Comprehensive Metabolic Panel [[id number]]  (Abnormal) Collected:  02/02/18 2107    Specimen:  Blood Updated:  02/02/18 2147     Glucose 181 (H) mg/dL      BUN 19 mg/dL      Creatinine 1.16 mg/dL      Sodium 144 mmol/L      Potassium 4.8 mmol/L      Chloride 101 mmol/L      CO2 30.2 (H) mmol/L      Calcium 9.5 mg/dL      Total Protein 7.8  g/dL      Albumin 3.90 g/dL      ALT (SGPT) 33 U/L      AST (SGOT) 57 (H) U/L      Alkaline Phosphatase 113 U/L      Total Bilirubin 0.6 mg/dL      eGFR Non African Amer 60 (L) mL/min/1.73      Globulin 3.9 gm/dL      A/G Ratio 1.0 g/dL      BUN/Creatinine Ratio 16.4     Anion Gap 12.8 mmol/L     Narrative:       The MDRD GFR formula is only valid for adults with stable renal function between ages 18 and 70.    Troponin [[id number]]  (Normal) Collected:  02/02/18 2107    Specimen:  Blood Updated:  02/02/18 2147     Troponin T 0.018 ng/mL     Narrative:       Troponin T Reference Ranges:  Less than 0.03 ng/mL:    Negative for AMI  0.03 to 0.09 ng/mL:      Indeterminant for AMI  Greater than 0.09 ng/mL: Positive for AMI    BNP [[id number]]  (Abnormal) Collected:  02/02/18 2107    Specimen:  Blood Updated:  02/02/18 2145     proBNP 6799.0 (H) pg/mL     Narrative:       Among patients with dyspnea, NT-proBNP is highly sensitive for the detection of acute congestive heart failure. In addition NT-proBNP of <300 pg/ml effectively rules out acute congestive heart failure with 99% negative predictive value.    Protime-INR [[id number]]  (Abnormal) Collected:  02/02/18 2107    Specimen:  Blood Updated:  02/02/18 2140     Protime 23.2 (H) Seconds      INR 2.15 (H)    CBC Auto Differential [[id number]]  (Abnormal) Collected:  02/02/18 2107    Specimen:  Blood Updated:  02/02/18 2128     WBC 14.48 (H) 10*3/mm3      RBC 4.44 (L) 10*6/mm3      Hemoglobin 14.1 g/dL      Hematocrit 45.1 %      .6 (H) fL      MCH 31.8 pg      MCHC 31.3 (L) g/dL      RDW 14.4 %      RDW-SD 53.9 fl      MPV 10.4 fL      Platelets 207 10*3/mm3      Neutrophil % 73.1 %      Lymphocyte % 18.2 (L) %      Monocyte % 7.0 %      Eosinophil % 1.2 %      Basophil % 0.3 %      Immature Grans % 0.2 %      Neutrophils, Absolute 10.59 (H) 10*3/mm3      Lymphocytes, Absolute 2.63 10*3/mm3      Monocytes, Absolute 1.01 10*3/mm3      Eosinophils, Absolute 0.18  "10*3/mm3      Basophils, Absolute 0.04 10*3/mm3      Immature Grans, Absolute 0.03 10*3/mm3     Procalcitonin [[id number]]  (Abnormal) Collected:  02/02/18 2107    Specimen:  Blood Updated:  02/02/18 2219     Procalcitonin 0.08 (L) ng/mL     Narrative:       As a Marker for Sepsis (Non-Neonates):   1. <0.5 ng/mL represents a low risk of severe sepsis and/or septic shock.  1. >2 ng/mL represents a high risk of severe sepsis and/or septic shock.    As a Marker for Lower Respiratory Tract Infections that require antibiotic therapy:  PCT on Admission     Antibiotic Therapy             6-12 Hrs later  > 0.5                Strongly Recommended            >0.25 - <0.5         Recommended  0.1 - 0.25           Discouraged                   Remeasure/reassess PCT  <0.1                 Strongly Discouraged          Remeasure/reassess PCT      As 28 day mortality risk marker: \"Change in Procalcitonin Result\" (> 80 % or <=80 %) if Day 0 (or Day 1) and Day 4 values are available. Refer to [email or web address]://[email or web address] [email or web address]cal[facility]/   Change in PCT <=80 %   A decrease of PCT levels below or equal to 80 % defines a positive change in PCT test result representing a higher risk for 28-day all-cause mortality of patients diagnosed with severe sepsis or septic shock.  Change in PCT > 80 %   A decrease of PCT levels of more than 80 % defines a negative change in PCT result representing a lower risk for 28-day all-cause mortality of patients diagnosed with severe sepsis or septic shock.                Lactic Acid, Plasma [[id number]]  (Abnormal) Collected:  02/02/18 2154    Specimen:  Blood Updated:  02/02/18 2216     Lactate 2.4 (C) mmol/L     Lactic Acid, Reflex Timer (This will reflex a repeat order 3-3:15 hours after ordered.) [[id number]] Collected:  02/02/18 2154    Specimen:  Blood Updated:  02/03/18 0131     Extra Tube Hold for add-ons.      Auto resulted.       Blood Culture - Blood, [[id number]] Collected:  02/02/18 2206    Specimen:  " Blood from Arm, Right Updated:  02/02/18 2212    Blood Culture - Blood, [[id number]] Collected:  02/02/18 2206    Specimen:  Blood from Arm, Left Updated:  02/02/18 2211    Respiratory Panel, PCR - Swab, Nasopharynx [[id number]]  (Abnormal) Collected:  02/02/18 2208    Specimen:  Swab from Nasopharynx Updated:  02/02/18 2330     ADENOVIRUS, PCR Not Detected     Coronavirus 229E Not Detected     Coronavirus HKU1 Not Detected     Coronavirus NL63 Not Detected     Coronavirus OC43 Not Detected     Human Metapneumovirus Not Detected     Human Rhinovirus/Enterovirus Detected (A)     Influenza B PCR Not Detected     Parainfluenza Virus 1 Not Detected     Parainfluenza Virus 2 Not Detected     Parainfluenza Virus 3 Not Detected     Parainfluenza Virus 4 Not Detected     Bordetella pertussis pcr Not Detected     Influenza A H1N1 2009 PCR Not Detected     Chlamydophila pneumoniae PCR Not Detected     Mycoplasma pneumo by PCR Not Detected     Influenza A PCR Not Detected     Influenza A H3 Not Detected     Influenza A H1 Not Detected     RSV, PCR Not Detected    Lactic Acid, Reflex [[id number]]  (Normal) Collected:  02/03/18 0211    Specimen:  Blood Updated:  02/03/18 0258     Lactate 1.3 mmol/L           I ordered the above labs and reviewed the results    RADIOLOGY  XR Chest 2 View   Preliminary Result   Findings are suggestive of congestive heart failure, moderate right   pleural effusion. An infiltrate of the right lung base cannot be   excluded.              US Thoracentesis    (Results Pending)        I ordered the above noted radiological studies. Interpreted by radiologist. Reviewed by me in PACS.       PROCEDURES  Procedures      PROGRESS AND CONSULTS  ED Course   9:10 PM  Ordered blood work, CXR and EKG for further evaluation.   10:38 PM  Reviewed pt's history and workup with Dr. Montenegro.  After a bedside evaluation; Dr Montenegro agrees with the plan of care.  Time 11:45 PM  Discussed case with Dr Brizuela  (Pulmonologist)  Reviewed history, exam, results and treatments.  Discussed concerns and plan of care. Dr Brizuela accepts pt to be admitted to telemetry.    MEDICAL DECISION MAKING  Results were reviewed/discussed with the patient and they were also made aware of online access. Pt also made aware that some labs, such as cultures, will not be resulted during ER visit and follow up with PMD is necessary.     MDM  Number of Diagnoses or Management Options  Congestive heart failure, unspecified congestive heart failure chronicity, unspecified congestive heart failure type:   Pneumonia of right lower lobe due to infectious organism:      Amount and/or Complexity of Data Reviewed  Clinical lab tests: ordered and reviewed  Tests in the radiology section of CPT®: ordered and reviewed  Tests in the medicine section of CPT®: ordered  Decide to obtain previous medical records or to obtain history from someone other than the patient: yes           DIAGNOSIS  Final diagnoses:   Congestive heart failure, unspecified congestive heart failure chronicity, unspecified congestive heart failure type   Pneumonia of right lower lobe due to infectious organism       DISPOSITION  ADMISSION    Discussed treatment plan and reason for admission with pt/family and admitting physician.  Pt/family voiced understanding of the plan for admission for further testing/treatment as needed.         Latest Documented Vital Signs:  As of 6:32 AM  BP- 122/95 HR- 71 Temp- 97.1 °F (36.2 °C) (Temporal Artery ) O2 sat- 94%    --  Documentation assistance provided by surinder Busby for Vadim Lawrence PA-C.  Information recorded by the scribe was done at my direction and has been verified and validated by me.         Elias Busby  02/02/18 8340       RENUKA Snider III  02/03/18 7163

## 2018-02-03 NOTE — ED NOTES
"Advised patients nurse, DEWEY Moran that heart rate read 16638 bpm on monitor.  Per patient he is \"set at 61, but I don't know what that means.\"       Pilar Yang RN  02/03/18 0254    "

## 2018-02-03 NOTE — PROGRESS NOTES
PROGRESS NOTE  Patient Name: Harry Ventura  Age/Sex: 87 y.o. male  : 1930  MRN: [id number]    Date of Admission: 2018  Date of Encounter Visit: 18   LOS: 1 day   Patient Care Team:  Provider Not In System as PCP - General  NARINDER Blunt as PCP - Claims Attributed  Nidhi Juarez MD as Consulting Physician (Cardiology)  Karina Grace MD as Consulting Physician (Pulmonary Disease)  Delia Brooks MD PhD as Consulting Physician (Hematology and Oncology)  Erich Restrepo MD as Referring Physician (Family Medicine)    Chief Complaint: Shortness of breath    Interval History: Patient was admitted on 18 with shortness of breath and wheezing was found to have moderate to large right-sided effusion.  He tested positive for the common cold virus, patient has bronchodilator and he was supposed to have thoracentesis however his INR was in the therapeutic range and the procedure was postponed.  Currently there is no fever or chills.  Patient has been sick for a while.  He is on oxygen at all time with chronic hypoxemia at baseline.  No productive cough or expectoration.  When he  in the past it was clear.  No nausea or vomiting or GI or  complaints.    REVIEW OF SYSTEMS:   CARDIOVASCULAR: No chest pain, chest pressure or chest discomfort. No palpitations or edema.   RESPIRATORY: Shortness of breath   GASTROINTESTINAL: No anorexia, nausea, vomiting or diarrhea. No abdominal pain or blood.   HEMATOLOGIC: No bleeding or bruising.     Ventilator/Non-Invasive Ventilation Settings     None            Vital Signs  Temp:  [97.1 °F (36.2 °C)-98 °F (36.7 °C)] 97.2 °F (36.2 °C)  Heart Rate:  [62-99] 88  Resp:  [18-41] 26  BP: (100-164)/(64-95) 156/95  SpO2:  [91 %-100 %] 99 %  on  Flow (L/min):  [3-15] 4 O2 Device: nasal cannula    Intake/Output Summary (Last 24 hours) at 18 1427  Last data filed at 18 1244   Gross per 24 hour   Intake              360 ml   Output              335 ml  "  Net               25 ml     Flowsheet Rows         First Filed Value    Admission Height  170.2 cm (67\") Documented at 02/02/2018 2048    Admission Weight  74.8 kg (165 lb) Documented at 02/02/2018 2048        Body mass index is 26.2 kg/(m^2).  Last 3 weights    02/02/18 2048 02/03/18  0305   Weight: 74.8 kg (165 lb) 75.9 kg (167 lb 4.8 oz)       Physical Exam:  GEN:  No acute distress, alert, cooperative, well developed , On nocturnal oxygen  EYES:   Sclera clear. No icterus. PERRL. Normal EOM  ENT:   External ears/nose normal, no oral lesions, no thrush, mucous membranes moist  NECK:  Supple, midline trachea, no JVD  LUNGS: Normal chest on inspection, positive expiratory wheezes bilaterally with diminished breath sounds posteriorly and dullness to percussion over the bases right more than left. Respirations regular, slightly labored.   CV:  Irregular rhythm and normal  rate. Normal S1/S2. No murmurs, gallops, or rubs noted.  ABD:  Soft, non-tender and non-distended. Normal bowel sounds. No guarding  EXT:  Moves all extremities well. No cyanosis. No redness. No edema.   Skin: dry, intact, no bleeding    Results Review:        Results from last 7 days  Lab Units 02/02/18 2107   SODIUM mmol/L 144   POTASSIUM mmol/L 4.8   CHLORIDE mmol/L 101   CO2 mmol/L 30.2*   BUN mg/dL 19   CREATININE mg/dL 1.16   CALCIUM mg/dL 9.5   AST (SGOT) U/L 57*   ALT (SGPT) U/L 33   ANION GAP mmol/L 12.8   ALBUMIN g/dL 3.90       Results from last 7 days  Lab Units 02/02/18 2107   TROPONIN T ng/mL 0.018           Results from last 7 days  Lab Units 02/02/18 2107   PROBNP pg/mL 6799.0*       Results from last 7 days  Lab Units 02/02/18 2107   WBC 10*3/mm3 14.48*   HEMOGLOBIN g/dL 14.1   HEMATOCRIT % 45.1   PLATELETS 10*3/mm3 207   MCV fL 101.6*   NEUTROPHIL % % 73.1   LYMPHOCYTE % % 18.2*   MONOCYTES % % 7.0   EOSINOPHIL % % 1.2   BASOPHIL % % 0.3   IMM GRAN % % 0.2       Results from last 7 days  Lab Units 02/02/18  2107   INR  2.15* "                     No results found for: POCGLU    Results from last 7 days  Lab Units 02/03/18  0211 02/02/18  2154 02/02/18 2107   PROCALCITONIN ng/mL  --   --  0.08*   LACTATE mmol/L 1.3 2.4*  --        Results from last 7 days  Lab Units 02/02/18  2206   BLOODCX  No growth at less than 24 hours  No growth at less than 24 hours           Results from last 7 days  Lab Units 02/02/18  2208   ADENOVIRUS DETECTION BY PCR  Not Detected   CORONAVIRUS 229E  Not Detected   CORONAVIRUS HKU1  Not Detected   CORONAVIRUS NL63  Not Detected   CORONAVIRUS OC43  Not Detected   HUMAN METAPNEUMOVIRUS  Not Detected   HUMAN RHINOVIRUS/ENTEROVIRUS  Detected*   INFLUENZA B PCR  Not Detected   PARAINFLUENZA 1  Not Detected   PARAINFLUENZA VIRUS 2  Not Detected   PARAINFLUENZA VIRUS 3  Not Detected   PARAINFLUENZA VIRUS 4  Not Detected   BORDETELLA PERTUSSIS PCR  Not Detected   CHLAMYDOPHILA PNEUMONIAE PCR  Not Detected   MYCOPLAMA PNEUMO PCR  Not Detected   INFLUENZA A PCR  Not Detected   INFLUENZA A H3  Not Detected   INFLUENZA A H1  Not Detected   RSV, PCR  Not Detected           Imaging:   Imaging Results (all)     Procedure Component Value Units Date/Time    XR Chest 2 View [[id number]] Collected:  02/02/18 2126     Updated:  02/02/18 2126    Narrative:       CHEST PA AND LATERAL.     HISTORY: Shortness of breath.     COMPARISON: 08/28/2016.     FINDINGS:  Cardiomegaly, lung volumes are low. Severe pulmonary congestion has  developed, moderately large right pleural effusion.     Infiltrate at the right lung base cannot be excluded.              Impression:       Findings are suggestive of congestive heart failure, moderate right  pleural effusion. An infiltrate of the right lung base cannot be  excluded.                          I reviewed the patient's new clinical results.  I personally viewed and interpreted the patient's imaging results:Right pleural effusion with secondary atelectasis cannot rule out infection and  infiltrate, positive cardiomegaly        Medication Review:     atorvastatin 40 mg Oral Daily   furosemide 40 mg Oral Daily   metoprolol succinate XL 50 mg Oral Q12H   pantoprazole 40 mg Oral QAM   potassium chloride 20 mEq Oral BID   traMADol 100 mg Oral BID            ASSESSMENT:   1. Diastolic congestive heart failure with exacerbation  2. Large right pleural effusion  3. Human rhinovirus infection  4. Mild pulmonary hypertension (mPAP = 27) in 2018  5. Obstructive sleep apnea, mild, untreated,  6. Bronchospasm with wheezing,     PLAN:  Hold on the thoracentesis until the INR is within safe range, given the lack of the urgency hour and hold on the blood thinner and give a day or 2 until the INR drops by itself  Use bronchodilator on a scheduled basis for the wheezing that is likely bronchospasm triggered by the rhinovirus  Resume home regimen of diuresis    Discussed with the patient and with the wife at the bedside in detail         Disposition: Home Monday or Tuesday    Karina Grace MD  02/03/18  2:27 PM          EMR Dragon/Transcription:   Much of this encounter note is an electronic transcription/translation of spoken language to printed text. The electronic translation of spoken language may permit erroneous, or at times, nonsensical words or phrases to be inadvertently transcribed; Although I have reviewed the note for such errors, some may still exist.

## 2018-02-03 NOTE — ED NOTES
Patient resting on stretcher. Awake and alert. No distress noted.     Luisa Price, RN  02/03/18 0557

## 2018-02-03 NOTE — PROGRESS NOTES
Clinical Pharmacy Services: Medication History    Harry Ventura is a 87 y.o. male presenting to [facility] for   Chief Complaint   Patient presents with   • Shortness of Breath       He  has a past medical history of Accelerated essential hypertension; Anemia; Atrial fibrillation; CAP (community acquired pneumonia); CHF (congestive heart failure); Chronic anticoagulation; Chronic diastolic CHF (congestive heart failure); CLL (chronic lymphocytic leukemia); Coronary artery disease; ED (erectile dysfunction) of non-organic origin; Former smoker; Gait instability; GERD (gastroesophageal reflux disease); Glucose intolerance (malabsorption); H/O Thrombocytopenia; Hematuria; History of EKG (08/28/2016); Hypercholesterolemia; Hypertension; Hypogonadism male; Insomnia; Iron overload; Low back pain; Macrocytosis; Personal history of CLL (chronic lymphocytic leukemia); Personal history of venous thrombosis and embolism; Proteinuria; Renal insufficiency; and Seborrheic dermatitis.    Allergies as of 02/02/2018   • (No Known Allergies)       Medication information was obtained from: Spouse, medication bottles  Pharmacy and Phone Number: [facility] [phone]    Prior to Admission Medications     Prescriptions Last Dose Informant Patient Reported? Taking?    aspirin 81 MG EC tablet  Medication Bottle Yes Yes    Take 81 mg by mouth Daily.    atorvastatin (LIPITOR) 40 MG tablet  Medication Bottle Yes Yes    Take 40 mg by mouth Daily.    furosemide (LASIX) 40 MG tablet  Medication Bottle Yes Yes    Take 40 mg by mouth Daily.    metoprolol succinate XL (TOPROL-XL) 50 MG 24 hr tablet  Medication Bottle No Yes    Take 1 tablet by mouth 2 (Two) Times a Day.    omeprazole (priLOSEC) 20 MG capsule  Medication Bottle Yes Yes    Take 20 mg by mouth Daily.    potassium chloride (K-DUR,KLOR-CON) 10 MEQ CR tablet  Medication Bottle No Yes    Take 1 tablet by mouth 2 (Two) Times a Day.    traMADol (ULTRAM) 50 MG tablet   Spouse/Significant Other Yes Yes    Take 100 mg by mouth 2 (Two) Times a Day.    valACYclovir (VALTREX) 500 MG tablet  Medication Bottle Yes Yes    Take 500 mg by mouth 2 (Two) Times a Day As Needed (For fever blisters).    warfarin (COUMADIN) 2.5 MG tablet  Medication Bottle Yes Yes    Take 2.5 mg by mouth See Admin Instructions. Daily or as directed by physician            Medication notes: None    This medication list is complete to the best of my knowledge as of 2/2/2018    Please call if questions.    Deborah Maguire, Medication History Technician  2/2/2018 10:31 PM

## 2018-02-03 NOTE — ED PROVIDER NOTES
HPI: Pt is a 87 y.o. male who presents to the ED c/o SOA per EMS. Pt has a h/o CHF. Pt has seen Dr. Alvarado for pulmonology but has not seen him for several months. Pt is normally on 3L at  homeOn exam, pt is on 3.5L with O2 saturation 96%, mild tachypnea, coarse rhonchi bilaterally.    EKG           EKG time: 2132  Rhythm/Rate: afib, 97  P waves and OK: afib  QRS, axis: normal   ST and T waves: lateral ST depression and diffuse flattened T waves     Interpreted Contemporaneously by me, independently viewed  unchanged compared to prior 2016      Discussed Chest Xray and lab findings in the ED and need for admission based on CHF findings and need for abx    I supervised care provided by the midlevel provider.    We have discussed this patient's history, physical exam, and treatment plan.   I have reviewed the note and personally saw and examined the patient and agree with the plan of care.    Documentation assistance provided by surinder Washington for Dr. Everette Montenegro.  Information recorded by the surinder was done at my direction and has been verified and validated by me.         Shelby Washington  02/02/18 8268       Everette Montenegro MD  02/02/18 9530

## 2018-02-03 NOTE — ED NOTES
Patient in with complaint of SOA, came in on non re breather with sat of 94% at 15L. EMS reports when they took him off O2 he dropped down to 90%.  On EMS arrival he was at 80% and patient reports his O2 was not helping, he wears 3l via nasal  canula for pulmonary HTN.      Tami Pimentel RN  02/02/18 2041

## 2018-02-03 NOTE — PLAN OF CARE
Problem: Fall Risk (Adult)  Goal: Identify Related Risk Factors and Signs and Symptoms  Outcome: Ongoing (interventions implemented as appropriate)    Goal: Absence of Falls  Outcome: Ongoing (interventions implemented as appropriate)      Problem: Cardiac: Heart Failure (Adult)  Goal: Signs and Symptoms of Listed Potential Problems Will be Absent or Manageable (Cardiac: Heart Failure)  Outcome: Ongoing (interventions implemented as appropriate)

## 2018-02-03 NOTE — H&P
Patient Care Team:  Provider Not In System as PCP - General  NARINDER Blunt as PCP - Claims Attributed  Nidhi Juarez MD as Consulting Physician (Cardiology)  Karina Grace MD as Consulting Physician (Pulmonary Disease)  Delia Brooks MD PhD as Consulting Physician (Hematology and Oncology)  Erich Restrepo MD as Referring Physician (Family Medicine)    Chief complaint:  Shortness of breath    History of present illness:  This is an 87-year-old male patient with history of diastolic CHF and moderate pulmonary hypertension.  He follows in our clinic and he was seen by Dr. Yu September 2017.  I reviewed the note.  He has mild pulmonary hypertension which was attributed to cardiac issues.  Patient follows with Dr. Juarez for his cardiac issues including the pacemaker and CHF.    At baseline, he has dyspnea on exertion and chronic cough.  He reported worsening symptoms since yesterday with significant dyspnea.  His wife, who used to be a pediatric nurse, noted that he was breathing fast up to 50 times a minute.  She tried to listen to his heart which was having a lot of rhonchi.  Patient continued to have worsening shortness of breath on minimal exertion and even at rest.  EMS was called and he was transferred to the ED.  He does take Bumex for CHF and denies skipping medications.  He denies leg swelling or orthopnea.    In ED, he had a CXR which was suggestive of effusion and possible pneumonia.  He received antibiotics for suspected pneumonia and Lasix 40 mg which resulted in mild drop in his blood pressure    Labs reviewed:  ProBNP = 6799; serum bicarbonate = 30; lactic acid = 2.4; pro-calcitonin = 0.08; WBC = 14.5 K; platelets = 207;  Respiratory viral panel positive for human rhinovirus    Right heart cath 6/17/16:  PA 46/21/27, PCWP 17/25/17     Review of Systems:  Constitutional: No fever or chills.   ENMT: No sinus congestion  Cardiovascular: No chest pain, palpitation or legs swelling.     Respiratory: Dyspnea and cough productive of clear phlegm.  Intermittent wheezing  Gastrointestinal: No constipation, diarrhea or abdominal pain   Neurology: No headache, weakness, numbness or dizziness.   Musculoskeletal: No joints pain, stiffness or swelling.   Psychiatry: No depression.  Lymphatic: No swollen glands.  Integumentary: No rash.    History  Past Medical History:   Diagnosis Date   • Accelerated essential hypertension    • Anemia     Mild   • Atrial fibrillation    • CAP (community acquired pneumonia)    • CHF (congestive heart failure)    • Chronic anticoagulation    • Chronic diastolic CHF (congestive heart failure)    • CLL (chronic lymphocytic leukemia)     Stage 0   • Coronary artery disease    • ED (erectile dysfunction) of non-organic origin    • Former smoker    • Gait instability    • GERD (gastroesophageal reflux disease)    • Glucose intolerance (malabsorption)    • H/O Thrombocytopenia     Mild   • Hematuria    • History of EKG 08/28/2016   • Hypercholesterolemia    • Hypertension     Pulmonary   • Hypogonadism male    • Insomnia    • Iron overload    • Low back pain    • Macrocytosis    • Personal history of CLL (chronic lymphocytic leukemia)    • Personal history of venous thrombosis and embolism    • Proteinuria    • Renal insufficiency    • Seborrheic dermatitis      Past Surgical History:   Procedure Laterality Date   • CARDIAC CATHETERIZATION N/A 6/17/2016    Procedure: Right and Left Heart Cath;  Surgeon: Harry Bolton MD;  Location: [facility] INVASIVE LOCATION;  Service:    • CARDIAC CATHETERIZATION N/A 6/17/2016    Procedure: Coronary angiography;  Surgeon: Harry Bolton MD;  Location: [facility] INVASIVE LOCATION;  Service:    • CARDIAC CATHETERIZATION N/A 6/17/2016    Procedure: Left Heart Cath;  Surgeon: Harry Bolton MD;  Location: [facility] INVASIVE LOCATION;  Service:    • CARDIAC CATHETERIZATION N/A 6/17/2016    Procedure: Left ventriculography;  Surgeon: Harry Bolton MD;   Location: [facility] INVASIVE LOCATION;  Service:    • CHOLECYSTECTOMY  1983   • INSERT / REPLACE / REMOVE PACEMAKER     • UMBILICAL HERNIA REPAIR  2006    Performed laparoscopically by Dr. Garcia.     Family History   Problem Relation Age of Onset   • Emphysema Father    • Emphysema Sister    • Coronary artery disease Other    • No Known Problems Mother    • Liver cancer Paternal Uncle 55   • Liver cancer Paternal Uncle 74   • Liver cancer Paternal Uncle 79     Social History   Substance Use Topics   • Smoking status: Former Smoker   • Smokeless tobacco: None      Comment: Quit smoking over 30 years ago. //  caffeine use   • Alcohol use Yes      Comment: Three drinks a day wine or vodka, reporting 28 drinks a week.        (Not in a hospital admission)  Allergies:  Review of patient's allergies indicates no known allergies.    Vital Signs  Temp:  [97.1 °F (36.2 °C)] 97.1 °F (36.2 °C)  Heart Rate:  [71-99] 71  Resp:  [26-41] 41  BP: (112-164)/(66-89) 112/66    Physical Exam:  Constitutional: Not in acute distress.  Eyes: Injected conjunctiva, EOMI.  ENMT: [place] 3. No oral thrush.   Heart: RRR, no murmur  Lungs: Decreased air entry on the right lower lobe.  No crackles or rhonchi          Abdomen: Obese. Soft. No tenderness or dullness.  Extremities: No cyanosis, clubbing or pitting edema. Moves all extremities.  Neuro: Conscious, alert, oriented x3  Psych: Appropriate mood and affect.    Integumentary: No rash  Lymphatic: No palpable cervical or supraclavicular lymph nodes.            Diagnostic imaging:  I personally and independently reviewed the following images:   Moderate to large right pleural effusion.          Assessment and Plan:  1. Diastolic congestive heart failure with exacerbation  2. Large right pleural effusion  3. Human rhinovirus infection  4. Mild pulmonary hypertension (mPAP = 27) in 2018  5. Obstructive sleep apnea, mild, untreated,  6. Mild wheezing, secondary to bronchospasm versus  interstitial fluid overload    Overall, likely CHF exacerbation precipitated by viral illness.  Doubt superimposed bacterial pneumonia.  ·   · Admit to telemetry  · I took him off oxygen and his SPO2 was 91-94 percent  · Report of treatment for that human rhinovirus  · Arrange for ultrasound-guided thoracentesis  · Trial of bronchodilators for wheezing  · re address obstructive sleep apnea therapy as an outpatient.    I discussed the patients findings and my recommendations with patient, family and ED physician.     Jim Brizuela MD  02/02/18  11:40 PM

## 2018-02-04 LAB
BACTERIA BLD CULT: ABNORMAL
BASOPHILS # BLD AUTO: 0.01 10*3/MM3 (ref 0–0.2)
BASOPHILS NFR BLD AUTO: 0.1 % (ref 0–1.5)
DEPRECATED RDW RBC AUTO: 50.8 FL (ref 37–54)
EOSINOPHIL # BLD AUTO: 0.03 10*3/MM3 (ref 0–0.7)
EOSINOPHIL NFR BLD AUTO: 0.2 % (ref 0.3–6.2)
ERYTHROCYTE [DISTWIDTH] IN BLOOD BY AUTOMATED COUNT: 14.3 % (ref 11.5–14.5)
HCT VFR BLD AUTO: 37.8 % (ref 40.4–52.2)
HGB BLD-MCNC: 12.6 G/DL (ref 13.7–17.6)
IMM GRANULOCYTES # BLD: 0.02 10*3/MM3 (ref 0–0.03)
IMM GRANULOCYTES NFR BLD: 0.2 % (ref 0–0.5)
INR PPP: 2.08 (ref 0.9–1.1)
LYMPHOCYTES # BLD AUTO: 1.71 10*3/MM3 (ref 0.9–4.8)
LYMPHOCYTES NFR BLD AUTO: 13.2 % (ref 19.6–45.3)
MAGNESIUM SERPL-MCNC: 1.5 MG/DL (ref 1.6–2.4)
MAGNESIUM SERPL-MCNC: 2.5 MG/DL (ref 1.6–2.4)
MAGNESIUM SERPL-MCNC: 2.8 MG/DL (ref 1.6–2.4)
MCH RBC QN AUTO: 32.6 PG (ref 27–32.7)
MCHC RBC AUTO-ENTMCNC: 33.3 G/DL (ref 32.6–36.4)
MCV RBC AUTO: 97.7 FL (ref 79.8–96.2)
MONOCYTES # BLD AUTO: 1.25 10*3/MM3 (ref 0.2–1.2)
MONOCYTES NFR BLD AUTO: 9.6 % (ref 5–12)
NEUTROPHILS # BLD AUTO: 9.98 10*3/MM3 (ref 1.9–8.1)
NEUTROPHILS NFR BLD AUTO: 76.7 % (ref 42.7–76)
PLATELET # BLD AUTO: 188 10*3/MM3 (ref 140–500)
PMV BLD AUTO: 10.9 FL (ref 6–12)
POTASSIUM BLD-SCNC: 4.6 MMOL/L (ref 3.5–5.2)
PROCALCITONIN SERPL-MCNC: 0.19 NG/ML (ref 0.1–0.25)
PROTHROMBIN TIME: 22.7 SECONDS (ref 11.7–14.2)
RBC # BLD AUTO: 3.87 10*6/MM3 (ref 4.6–6)
WBC NRBC COR # BLD: 13 10*3/MM3 (ref 4.5–10.7)

## 2018-02-04 PROCEDURE — 83735 ASSAY OF MAGNESIUM: CPT | Performed by: INTERNAL MEDICINE

## 2018-02-04 PROCEDURE — 84132 ASSAY OF SERUM POTASSIUM: CPT | Performed by: INTERNAL MEDICINE

## 2018-02-04 PROCEDURE — 85610 PROTHROMBIN TIME: CPT | Performed by: INTERNAL MEDICINE

## 2018-02-04 PROCEDURE — 25010000002 VITAMIN K1 PER 1 MG: Performed by: INTERNAL MEDICINE

## 2018-02-04 PROCEDURE — 25010000002 MAGNESIUM SULFATE IN D5W 1G/100ML (PREMIX) 1-5 GM/100ML-% SOLUTION: Performed by: INTERNAL MEDICINE

## 2018-02-04 PROCEDURE — 85025 COMPLETE CBC W/AUTO DIFF WBC: CPT | Performed by: INTERNAL MEDICINE

## 2018-02-04 PROCEDURE — 84145 PROCALCITONIN (PCT): CPT | Performed by: INTERNAL MEDICINE

## 2018-02-04 RX ORDER — GUAIFENESIN 600 MG/1
1200 TABLET, EXTENDED RELEASE ORAL EVERY 12 HOURS SCHEDULED
Status: DISCONTINUED | OUTPATIENT
Start: 2018-02-04 | End: 2018-02-06 | Stop reason: HOSPADM

## 2018-02-04 RX ORDER — CHLORDIAZEPOXIDE HYDROCHLORIDE 10 MG/1
10 CAPSULE, GELATIN COATED ORAL EVERY 8 HOURS SCHEDULED
Status: DISCONTINUED | OUTPATIENT
Start: 2018-02-04 | End: 2018-02-06 | Stop reason: HOSPADM

## 2018-02-04 RX ORDER — MAGNESIUM SULFATE 1 G/100ML
1 INJECTION INTRAVENOUS
Status: COMPLETED | OUTPATIENT
Start: 2018-02-04 | End: 2018-02-04

## 2018-02-04 RX ADMIN — MAGNESIUM SULFATE HEPTAHYDRATE 1 G: 1 INJECTION, SOLUTION INTRAVENOUS at 03:45

## 2018-02-04 RX ADMIN — MAGNESIUM SULFATE HEPTAHYDRATE 1 G: 1 INJECTION, SOLUTION INTRAVENOUS at 02:41

## 2018-02-04 RX ADMIN — METOPROLOL SUCCINATE 50 MG: 50 TABLET, FILM COATED, EXTENDED RELEASE ORAL at 22:06

## 2018-02-04 RX ADMIN — PHYTONADIONE 5 MG: 10 INJECTION, EMULSION INTRAMUSCULAR; INTRAVENOUS; SUBCUTANEOUS at 13:52

## 2018-02-04 RX ADMIN — TRAMADOL HYDROCHLORIDE 100 MG: 50 TABLET, FILM COATED ORAL at 22:07

## 2018-02-04 RX ADMIN — CHLORDIAZEPOXIDE HYDROCHLORIDE 10 MG: 10 CAPSULE ORAL at 13:51

## 2018-02-04 RX ADMIN — GUAIFENESIN 1200 MG: 600 TABLET, EXTENDED RELEASE ORAL at 22:07

## 2018-02-04 RX ADMIN — TRAMADOL HYDROCHLORIDE 100 MG: 50 TABLET, FILM COATED ORAL at 08:30

## 2018-02-04 RX ADMIN — MAGNESIUM SULFATE HEPTAHYDRATE 1 G: 1 INJECTION, SOLUTION INTRAVENOUS at 05:43

## 2018-02-04 RX ADMIN — METOPROLOL SUCCINATE 50 MG: 50 TABLET, FILM COATED, EXTENDED RELEASE ORAL at 08:30

## 2018-02-04 RX ADMIN — MAGNESIUM SULFATE HEPTAHYDRATE 1 G: 1 INJECTION, SOLUTION INTRAVENOUS at 04:46

## 2018-02-04 RX ADMIN — CHLORDIAZEPOXIDE HYDROCHLORIDE 10 MG: 10 CAPSULE ORAL at 22:07

## 2018-02-04 RX ADMIN — FUROSEMIDE 40 MG: 40 TABLET ORAL at 08:29

## 2018-02-04 RX ADMIN — POTASSIUM CHLORIDE 20 MEQ: 1.5 POWDER, FOR SOLUTION ORAL at 08:30

## 2018-02-04 RX ADMIN — ATORVASTATIN CALCIUM 40 MG: 20 TABLET, FILM COATED ORAL at 08:30

## 2018-02-04 RX ADMIN — PANTOPRAZOLE SODIUM 40 MG: 40 TABLET, DELAYED RELEASE ORAL at 07:01

## 2018-02-04 RX ADMIN — POTASSIUM CHLORIDE 20 MEQ: 1.5 POWDER, FOR SOLUTION ORAL at 22:11

## 2018-02-04 NOTE — NURSING NOTE
"Pt seems more confused that what has been documented thus far. Knows where he is, name/birthdate/year but is disoriented to situation. Asking about medical equipment multiple times in same conversation, asking what he needs to do with \"the voters coming in\". Pt states he is aware he is \"a little confused\". Attempted to call documented phone number of pt's wife Elena to inquire further about pt's baseline mental status, but no one answered and voicemail box was full-unable to leave message.  "

## 2018-02-04 NOTE — PROGRESS NOTES
PROGRESS NOTE  Patient Name: Harry Ventura  Age/Sex: 87 y.o. male  : 1930  MRN: [id number]    Date of Admission: 2018  Date of Encounter Visit: 18   LOS: 2 days   Patient Care Team:  Provider Not In System as PCP - General  NARINDER Blunt as PCP - Claims Attributed  Nidhi Juarez MD as Consulting Physician (Cardiology)  Karina Grace MD as Consulting Physician (Pulmonary Disease)  Delia Brooks MD PhD as Consulting Physician (Hematology and Oncology)  Erich Restrepo MD as Referring Physician (Family Medicine)    Chief Complaint: Shortness of breath    Interval History: Patient was admitted on 18 with shortness of breath and wheezing was found to have moderate to large right-sided effusion.  He tested positive for the common cold virus, patient has bronchodilator and he was supposed to have thoracentesis however his INR was in the therapeutic range and the procedure was postponed.  Currently there is no fever or chills.  Patient has been sick for a while.  He is on oxygen at all time with chronic hypoxemia at baseline.    Patient did admit to drinking 4 alcoholic beverages a day usually vodka-based, he had some episode of confusion yesterday but doing better today, his CIWA score is within normal level at 2 .  Still wheezing    REVIEW OF SYSTEMS:   CARDIOVASCULAR: No chest pain, chest pressure or chest discomfort. No palpitations or edema.   RESPIRATORY: Shortness of breath   GASTROINTESTINAL: No anorexia, nausea, vomiting or diarrhea. No abdominal pain or blood.   HEMATOLOGIC: No bleeding or bruising.     Ventilator/Non-Invasive Ventilation Settings     None            Vital Signs  Temp:  [95.6 °F (35.3 °C)-98.4 °F (36.9 °C)] 95.8 °F (35.4 °C)  Heart Rate:  [61-88] 61  Resp:  [18-26] 18  BP: (122-140)/(70-87) 122/70  SpO2:  [92 %-99 %] 97 %  on  Flow (L/min):  [3-4] 3 O2 Device: nasal cannula    Intake/Output Summary (Last 24 hours) at 18 8315  Last data filed at 18  "1149   Gross per 24 hour   Intake              600 ml   Output               75 ml   Net              525 ml     Flowsheet Rows         First Filed Value    Admission Height  170.2 cm (67\") Documented at 02/02/2018 2048    Admission Weight  74.8 kg (165 lb) Documented at 02/02/2018 2048        Body mass index is 25.18 kg/(m^2).  Last 3 weights    02/02/18 2048 02/03/18  0305 02/04/18  0557   Weight: 74.8 kg (165 lb) 75.9 kg (167 lb 4.8 oz) 72.9 kg (160 lb 12.8 oz)       Physical Exam:  GEN:  No acute distress, alert, cooperative, well developed , On nocturnal oxygen  EYES:   Sclera clear. No icterus. PERRL. Normal EOM  ENT:   External ears/nose normal, no oral lesions, no thrush, mucous membranes moist  NECK:  Supple, midline trachea, no JVD  LUNGS: Normal chest on inspection, positive Inspiratory and expiratory wheezes bilaterally with diminished breath sounds posteriorly and dullness to percussion over the bases right more than left. Respirations regular,non labored.   CV:  Irregular rhythm and normal  rate. Normal S1/S2. No murmurs, gallops, or rubs noted.  ABD:  Soft, non-tender and non-distended. Normal bowel sounds. No guarding  EXT:  Moves all extremities well. No cyanosis. No redness. No edema.   Skin: dry, intact, no bleeding    Results Review:        Results from last 7 days  Lab Units 02/04/18  0030 02/02/18 2107   SODIUM mmol/L  --  144   POTASSIUM mmol/L 4.6 4.8   CHLORIDE mmol/L  --  101   CO2 mmol/L  --  30.2*   BUN mg/dL  --  19   CREATININE mg/dL  --  1.16   CALCIUM mg/dL  --  9.5   AST (SGOT) U/L  --  57*   ALT (SGPT) U/L  --  33   ANION GAP mmol/L  --  12.8   ALBUMIN g/dL  --  3.90       Results from last 7 days  Lab Units 02/02/18 2107   TROPONIN T ng/mL 0.018           Results from last 7 days  Lab Units 02/02/18 2107   PROBNP pg/mL 6799.0*       Results from last 7 days  Lab Units 02/04/18  0818 02/02/18 2107   WBC 10*3/mm3 13.00* 14.48*   HEMOGLOBIN g/dL 12.6* 14.1   HEMATOCRIT % 37.8* " 45.1   PLATELETS 10*3/mm3 188 207   MCV fL 97.7* 101.6*   NEUTROPHIL % % 76.7* 73.1   LYMPHOCYTE % % 13.2* 18.2*   MONOCYTES % % 9.6 7.0   EOSINOPHIL % % 0.2* 1.2   BASOPHIL % % 0.1 0.3   IMM GRAN % % 0.2 0.2       Results from last 7 days  Lab Units 02/04/18  0030 02/03/18  1455 02/02/18  2107   INR  2.08* 2.29* 2.15*       Results from last 7 days  Lab Units 02/04/18  0818 02/04/18  0030   MAGNESIUM mg/dL 2.8* 1.5*                 No results found for: POCGLU    Results from last 7 days  Lab Units 02/04/18  0818 02/03/18  0211 02/02/18  2154 02/02/18  2107   PROCALCITONIN ng/mL 0.19  --   --  0.08*   LACTATE mmol/L  --  1.3 2.4*  --        Results from last 7 days  Lab Units 02/02/18 2206   BLOODCX  Abnormal Stain*  No growth at 24 hours   GRAM STAIN RESULT  Anaerobic Bottle Gram positive cocci in clusters   BCIDPCR  Staphylococcus spp, not aureus. Identification by BCID PCR.*           Results from last 7 days  Lab Units 02/02/18 2208   ADENOVIRUS DETECTION BY PCR  Not Detected   CORONAVIRUS 229E  Not Detected   CORONAVIRUS HKU1  Not Detected   CORONAVIRUS NL63  Not Detected   CORONAVIRUS OC43  Not Detected   HUMAN METAPNEUMOVIRUS  Not Detected   HUMAN RHINOVIRUS/ENTEROVIRUS  Detected*   INFLUENZA B PCR  Not Detected   PARAINFLUENZA 1  Not Detected   PARAINFLUENZA VIRUS 2  Not Detected   PARAINFLUENZA VIRUS 3  Not Detected   PARAINFLUENZA VIRUS 4  Not Detected   BORDETELLA PERTUSSIS PCR  Not Detected   CHLAMYDOPHILA PNEUMONIAE PCR  Not Detected   MYCOPLAMA PNEUMO PCR  Not Detected   INFLUENZA A PCR  Not Detected   INFLUENZA A H3  Not Detected   INFLUENZA A H1  Not Detected   RSV, PCR  Not Detected           Imaging:   Imaging Results (all)     Procedure Component Value Units Date/Time    XR Chest 2 View [[id number]] Collected:  02/02/18 2126     Updated:  02/02/18 2126    Narrative:       CHEST PA AND LATERAL.     HISTORY: Shortness of breath.     COMPARISON: 08/28/2016.     FINDINGS:  Cardiomegaly, lung  volumes are low. Severe pulmonary congestion has  developed, moderately large right pleural effusion.     Infiltrate at the right lung base cannot be excluded.              Impression:       Findings are suggestive of congestive heart failure, moderate right  pleural effusion. An infiltrate of the right lung base cannot be  excluded.                          I reviewed the patient's new clinical results.  I personally viewed and interpreted the patient's imaging results:Right pleural effusion with secondary atelectasis cannot rule out infection and infiltrate, positive cardiomegaly        Medication Review:     atorvastatin 40 mg Oral Daily   furosemide 40 mg Oral Daily   metoprolol succinate XL 50 mg Oral Q12H   pantoprazole 40 mg Oral QAM   potassium chloride 20 mEq Oral BID   traMADol 100 mg Oral BID            ASSESSMENT:   1. Diastolic congestive heart failure with exacerbation  2. Large right pleural effusion  3. Human rhinovirus infection  4. Mild pulmonary hypertension (mPAP = 27) in 2018  5. Obstructive sleep apnea, mild, untreated,  6. Bronchospasm with wheezing,     PLAN:  Heavy alcohol consumption, at risk for DT, will start Librium  Will give 1 dose of vitamin K today and repeat INR tomorrow and proceed with a thoracentesis if in the safe level  Hopefully discharge home the following day depending on his respiratory overall status  Continue bronchodilator, he still have some wheezes on exam.  Continue with the home diuretic dose at this point  CIWA protocol, his core is currently at 2      Discussed with the patient and with the wife over the phone, she reported that he consumes alcohol more than the average person and the patient did admit that he drinks 4 glasses of vodka a day         Disposition: Home Monday or Tuesday    Karina Grace MD  02/04/18  12:48 PM          EMR Dragon/Transcription:   Much of this encounter note is an electronic transcription/translation of spoken language to printed  text. The electronic translation of spoken language may permit erroneous, or at times, nonsensical words or phrases to be inadvertently transcribed; Although I have reviewed the note for such errors, some may still exist.

## 2018-02-04 NOTE — PLAN OF CARE
Problem: Fall Risk (Adult)  Goal: Identify Related Risk Factors and Signs and Symptoms  Outcome: Outcome(s) achieved Date Met: 02/04/18 02/03/18 1130   Fall Risk   Fall Risk: Related Risk Factors age-related changes;fatigue/slow reaction;environment unfamiliar;gait/mobility problems   Fall Risk: Signs and Symptoms presence of risk factors     Goal: Absence of Falls  Outcome: Ongoing (interventions implemented as appropriate)   02/04/18 0518   Fall Risk (Adult)   Absence of Falls achieves outcome  (SAFETY MAINTAINED-BED ALARM ON AT ALL TIMES)       Problem: Patient Care Overview (Adult)  Goal: Plan of Care Review  Outcome: Ongoing (interventions implemented as appropriate)   02/03/18 2145 02/04/18 0518   Coping/Psychosocial Response Interventions   Plan Of Care Reviewed With patient --    Patient Care Overview   Progress --  no change   Outcome Evaluation   Outcome Summary/Follow up Plan --  Lungs still with coarse rhonchi/crackles/expiratory wheezing. Duo-Nebs PRN. Work of breathing seems to have improved some-pt satting high 90s on 4L O2 N/C-weaned to 3L which is pt's baseline at home. Pt with chronic a.fib/pacemaker-17 beat run v-tach observed on tele monitoring-STAT Mag and K+ obtained-Mag 1.5-called Dr. Yu to inform him of this event and orders to replace Mag obtained and initiated. Pt has been disoriented to situation noted early this morning. Pt drinks at least 4 alcoholic drinks/day at home-CIWA scores per protocol. On home diuretics for CHF. Continue to monitor     Goal: Adult Individualization and Mutuality  Outcome: Ongoing (interventions implemented as appropriate)      Problem: Cardiac: Heart Failure (Adult)  Goal: Signs and Symptoms of Listed Potential Problems Will be Absent or Manageable (Cardiac: Heart Failure)  Outcome: Ongoing (interventions implemented as appropriate)   02/04/18 0518   Cardiac: Heart Failure   Problems Assessed (Heart Failure) all   Problems Present (Heart Failure) cardiac  pump dysfunction;dysrhythmia/arrhythmia;fluid/electrolyte imbalance;functional decline/self-care deficit;respiratory compromise

## 2018-02-05 ENCOUNTER — APPOINTMENT (OUTPATIENT)
Dept: ULTRASOUND IMAGING | Facility: HOSPITAL | Age: 83
End: 2018-02-05

## 2018-02-05 LAB
APPEARANCE FLD: CLEAR
BACTERIA SPEC AEROBE CULT: ABNORMAL
BACTERIA SPEC AEROBE CULT: ABNORMAL
COLOR FLD: YELLOW
GRAM STN SPEC: ABNORMAL
INR PPP: 1.33 (ref 0.9–1.1)
LYMPHOCYTES NFR FLD MANUAL: 86 %
MONOCYTES NFR FLD: 4 %
MONOS+MACROS NFR FLD: 7 %
NEUTROPHILS NFR FLD MANUAL: 3 %
PROTHROMBIN TIME: 16 SECONDS (ref 11.7–14.2)
RBC # FLD AUTO: 31 /MM3
WBC # FLD: 101 /MM3

## 2018-02-05 PROCEDURE — 94799 UNLISTED PULMONARY SVC/PX: CPT

## 2018-02-05 PROCEDURE — 0W993ZZ DRAINAGE OF RIGHT PLEURAL CAVITY, PERCUTANEOUS APPROACH: ICD-10-PCS | Performed by: RADIOLOGY

## 2018-02-05 PROCEDURE — 89051 BODY FLUID CELL COUNT: CPT | Performed by: INTERNAL MEDICINE

## 2018-02-05 PROCEDURE — 76942 ECHO GUIDE FOR BIOPSY: CPT

## 2018-02-05 PROCEDURE — 85610 PROTHROMBIN TIME: CPT | Performed by: INTERNAL MEDICINE

## 2018-02-05 RX ORDER — POTASSIUM CHLORIDE 750 MG/1
20 CAPSULE, EXTENDED RELEASE ORAL 2 TIMES DAILY WITH MEALS
Status: DISCONTINUED | OUTPATIENT
Start: 2018-02-05 | End: 2018-02-06 | Stop reason: HOSPADM

## 2018-02-05 RX ORDER — LIDOCAINE HYDROCHLORIDE 10 MG/ML
20 INJECTION, SOLUTION INFILTRATION; PERINEURAL ONCE
Status: COMPLETED | OUTPATIENT
Start: 2018-02-05 | End: 2018-02-05

## 2018-02-05 RX ADMIN — CHLORDIAZEPOXIDE HYDROCHLORIDE 10 MG: 10 CAPSULE ORAL at 14:38

## 2018-02-05 RX ADMIN — GUAIFENESIN 1200 MG: 600 TABLET, EXTENDED RELEASE ORAL at 20:54

## 2018-02-05 RX ADMIN — IPRATROPIUM BROMIDE AND ALBUTEROL SULFATE 3 ML: .5; 3 SOLUTION RESPIRATORY (INHALATION) at 16:09

## 2018-02-05 RX ADMIN — ATORVASTATIN CALCIUM 40 MG: 20 TABLET, FILM COATED ORAL at 10:31

## 2018-02-05 RX ADMIN — FUROSEMIDE 40 MG: 40 TABLET ORAL at 10:32

## 2018-02-05 RX ADMIN — POTASSIUM CHLORIDE 20 MEQ: 750 CAPSULE, EXTENDED RELEASE ORAL at 10:43

## 2018-02-05 RX ADMIN — METOPROLOL SUCCINATE 50 MG: 50 TABLET, FILM COATED, EXTENDED RELEASE ORAL at 20:54

## 2018-02-05 RX ADMIN — METOPROLOL SUCCINATE 50 MG: 50 TABLET, FILM COATED, EXTENDED RELEASE ORAL at 10:32

## 2018-02-05 RX ADMIN — TRAMADOL HYDROCHLORIDE 100 MG: 50 TABLET, FILM COATED ORAL at 10:37

## 2018-02-05 RX ADMIN — PANTOPRAZOLE SODIUM 40 MG: 40 TABLET, DELAYED RELEASE ORAL at 06:24

## 2018-02-05 RX ADMIN — TRAMADOL HYDROCHLORIDE 100 MG: 50 TABLET, FILM COATED ORAL at 20:54

## 2018-02-05 RX ADMIN — CHLORDIAZEPOXIDE HYDROCHLORIDE 10 MG: 10 CAPSULE ORAL at 06:24

## 2018-02-05 RX ADMIN — CHLORDIAZEPOXIDE HYDROCHLORIDE 10 MG: 10 CAPSULE ORAL at 22:42

## 2018-02-05 RX ADMIN — POTASSIUM CHLORIDE 20 MEQ: 750 CAPSULE, EXTENDED RELEASE ORAL at 17:55

## 2018-02-05 RX ADMIN — LIDOCAINE HYDROCHLORIDE 20 ML: 10 INJECTION, SOLUTION INFILTRATION; PERINEURAL at 09:22

## 2018-02-05 RX ADMIN — IPRATROPIUM BROMIDE AND ALBUTEROL SULFATE 3 ML: .5; 3 SOLUTION RESPIRATORY (INHALATION) at 04:23

## 2018-02-05 RX ADMIN — GUAIFENESIN 1200 MG: 600 TABLET, EXTENDED RELEASE ORAL at 10:32

## 2018-02-05 NOTE — PLAN OF CARE
Problem: Patient Care Overview (Adult)  Goal: Plan of Care Review  Outcome: Ongoing (interventions implemented as appropriate)   02/05/18 7497   Coping/Psychosocial Response Interventions   Plan Of Care Reviewed With patient   Patient Care Overview   Progress improving   Outcome Evaluation   Outcome Summary/Follow up Plan thoracentesis today. 2700 out. breathing much better. cxray in the morningif good will be d/c home. will cont to monitor.

## 2018-02-05 NOTE — PLAN OF CARE
Problem: Fall Risk (Adult)  Goal: Absence of Falls  Outcome: Ongoing (interventions implemented as appropriate)      Problem: Patient Care Overview (Adult)  Goal: Plan of Care Review  Outcome: Ongoing (interventions implemented as appropriate)   02/05/18 0342   Coping/Psychosocial Response Interventions   Plan Of Care Reviewed With patient   Patient Care Overview   Progress no change   Outcome Evaluation   Outcome Summary/Follow up Plan Pt remains on 3L NC. CIWAs continued. Lungs still with expiratory wheezes. Mucinex started. Continue Librium. VSS. Safety maintained.       Problem: Cardiac: Heart Failure (Adult)  Goal: Signs and Symptoms of Listed Potential Problems Will be Absent or Manageable (Cardiac: Heart Failure)  Outcome: Ongoing (interventions implemented as appropriate)

## 2018-02-05 NOTE — PROGRESS NOTES
Discharge Planning Assessment  [facility]     Patient Name: Harry Ventura  MRN: [id number]  Today's Date: 2/5/2018    Admit Date: 2/2/2018          Discharge Needs Assessment       02/05/18 1443    Living Environment    Lives With spouse    Living Arrangements house    Home Accessibility stairs to enter home    Stair Railings at Home outside, present at both sides    Type of Financial/Environmental Concern none    Transportation Available family or friend will provide;car    Living Environment    Provides Primary Care For no one    Primary Care Provided By spouse/significant other    Quality Of Family Relationships supportive    Discharge Needs Assessment    Equipment Currently Used at Home oxygen    Equipment Needed After Discharge none    Discharge Disposition still a patient            Discharge Plan       02/05/18 1444    Case Management/Social Work Plan    Plan Home with no needs  Pt has new BMA physician but cannot recall his name    Additional Comments IMM   02/03   Spoke to patient at bedside.  I introduced myself and explained CCP role.  Verified face sheet.  Confirmed   that patient obtains his medications [facility] on [address].  Pt lives in a 1-story house with his wife.  Pt is normally independent with ADL's.   He uses no DME  He has oxygen but cannot recall the vendor.  He has no history of  Home Health or rehab stays.  No other needs CCP will follow        Discharge Placement     No information found                Demographic Summary     None            Functional Status       02/05/18 1442    Functional Status Current    Ambulation 3-->assistive equipment and person    Transferring 3-->assistive equipment and person    Toileting 3-->assistive equipment and person    Bathing 2-->assistive person    Dressing 2-->assistive person    Eating 0-->independent    Communication 0-->understands/communicates without difficulty    Change in Functional Status Since Onset of Current Illness/Injury no     Functional Status Prior    Ambulation 1-->assistive equipment    Transferring 1-->assistive equipment    Toileting 1-->assistive equipment    Bathing 2-->assistive person    Dressing 0-->independent    Eating 0-->independent            Psychosocial     None            Abuse/Neglect     None            Legal     None            Substance Abuse     None            Patient Forms     None          Dayanara Delgado RN

## 2018-02-05 NOTE — CONSULTS
"Adult Nutrition  Assessment/PES    Patient Name:  Harry Ventura  YOB: 1930  MRN: [id number]  Admit Date:  2/2/2018    Assessment Date:  2/5/2018    Assessment completed. Patient stated good appetite, consuming 75% of meals. Drinks boost at home-ordered supplement BID.          Reason for Assessment       02/05/18 1117    Reason for Assessment    Reason For Assessment/Visit identified at risk by screening criteria;nurse/nurse practitioner consult    Identified At Risk By Screening Criteria MST SCORE 2+    Diagnosis     Congestive heart failure                Anthropometrics       02/05/18 1117    Anthropometrics (Special Considerations)    Height Used for Calculations 1.702 m (5' 7\")    Weight Used for Calculations 73 kg (161 lb)    RD Calculated IBW 66.1    RD Calculated %     RD Calculated BMI (kg/m2) 25.3    Body Mass Index (BMI)    BMI Grade 25 - 29.9 - overweight            Labs/Tests/Procedures/Meds       02/05/18 1117    Labs/Tests/Procedures/Meds    Diagnostic Test/Procedure Review reviewed    Labs/Tests Review Reviewed;Mg++    Medication Review Reviewed, pertinent   PPI    Significant Vitals reviewed            Physical Findings       02/05/18 1118    Physical Findings/Assessment    Additional Documentation Physical Appearance (Group)   B=19    Physical Appearance    Overall Physical Appearance on oxygen therapy            Estimated/Assessed Needs       02/05/18 1118    Calculation Measurements    Weight Used For Calculations 73 kg (161 lb)    Height Used for Calculations 1.702 m (5' 7\")    Estimated/Assessed Energy Needs    Energy Need Method Kcal/kg    kcal/kg 25    25 Kcal/Kg (kcal) 1825.72    Estimated/Assessed Protein Needs    Weight Used for Protein Calculation 73 kg (161 lb)    Protein (gm/kg) 0.8    0.8 Gm Protein (gm) 58.42    Estimated/Assessed Fluid Needs    Fluid Need Method RDA method    RDA Method (mL)  1500            Nutrition Prescription Ordered       02/05/18 1118 "    Nutrition Prescription PO    Common Modifiers Cardiac            Evaluation of Received Nutrient/Fluid Intake       02/05/18 1118    PO Evaluation    Number of Meals 5    % PO Intake 75            Problem/Interventions:        Problem 1       02/05/18 1118    Nutrition Diagnoses Problem 1    Problem 1 Nutrition Appropriate for Condition at this Time                    Intervention Goal       02/05/18 1118    Intervention Goal    General Maintain nutrition;Meet nutritional needs for age/condition    PO Tolerate PO;Maintain intake    Weight Maintain weight            Nutrition Intervention       02/05/18 1118    Nutrition Intervention    RD/Tech Action Interview for preference;Follow Tx progress;Care plan reviewd;Encourage intake            Nutrition Prescription       02/05/18 1119    Nutrition Prescription PO    PO Prescription Begin/change supplement    Supplement Ensure    Supplement Frequency 2 times a day    New PO Prescription Ordered? Yes            Education/Evaluation       02/05/18 1119    Education    Education Will Instruct as appropriate    Monitor/Evaluation    Monitor Per protocol    Education Follow-up Reinforce PRN        Electronically signed by:  Deisy August RD  02/05/18 11:19 AM

## 2018-02-05 NOTE — PLAN OF CARE
Problem: Fall Risk (Adult)  Goal: Absence of Falls  Outcome: Ongoing (interventions implemented as appropriate)      Problem: Patient Care Overview (Adult)  Goal: Plan of Care Review  Outcome: Ongoing (interventions implemented as appropriate)   02/04/18 4758   Coping/Psychosocial Response Interventions   Plan Of Care Reviewed With patient;spouse   Patient Care Overview   Progress no change   Outcome Evaluation   Outcome Summary/Follow up Plan lungs still course with expiratory wheezes scattered throughout, pt continues on 3L NC, dyspneic on exertion, only 200cc urine output this shift- bladder scan PVR for 6cc, MD aware- continue to monitor voiding, sepsis screen positive and blood culture positve for gram + cocci in clusters- repeat CBC and Procalcitonin done- procal normal WBC slightly improved at 13, vitamin K given for INR 2.08, pt for possible thoracentesis tomorrow, no more VTach, started on Libriium for possible Alcohol withdrawl, CIWA scale continued, highest score this shift 3, safety maintained, vss, continue to monitor        Problem: Cardiac: Heart Failure (Adult)  Goal: Signs and Symptoms of Listed Potential Problems Will be Absent or Manageable (Cardiac: Heart Failure)  Outcome: Ongoing (interventions implemented as appropriate)

## 2018-02-05 NOTE — PROGRESS NOTES
PROGRESS NOTE  Patient Name: Harry Ventura  Age/Sex: 87 y.o. male  : 1930  MRN: [id number]    Date of Admission: 2018  Date of Encounter Visit: 18   LOS: 3 days   Patient Care Team:  Provider Not In System as PCP - General  NARINDER Blunt as PCP - Claims Attributed  Nidhi Juarez MD as Consulting Physician (Cardiology)  Karina Grace MD as Consulting Physician (Pulmonary Disease)  Delia Brooks MD PhD as Consulting Physician (Hematology and Oncology)  Erich Restrepo MD as Referring Physician (Family Medicine)    Chief Complaint: Shortness of breath    Interval History: Patient was admitted on 18 with shortness of breath and wheezing was found to have moderate to large right-sided effusion.  He tested positive for the common cold virus, patient has bronchodilator and he was supposed to have thoracentesis however his INR was in the therapeutic range and the procedure was postponed. Right sided thoracentesis on 18 with 2700 ml removed.     Patient did admit to drinking 4 alcoholic beverages a day usually vodka-based.    REVIEW OF SYSTEMS:   CARDIOVASCULAR: No chest pain, chest pressure or chest discomfort. No palpitations. edema  RESPIRATORY: Shortness of breath improved since thoracentesis. Cough with thick secretions and unable to expectorate.    GASTROINTESTINAL: No anorexia, nausea, vomiting or diarrhea. No abdominal pain or blood.   HEMATOLOGIC: No bleeding or bruising.   No fever or chills.   Great urine output     Ventilator/Non-Invasive Ventilation Settings     None        Vital Signs  Temp:  [95.3 °F (35.2 °C)-96.9 °F (36.1 °C)] 96.8 °F (36 °C)  Heart Rate:  [62-74] 62  Resp:  [16-20] 18  BP: ()/(59-80) 107/74  SpO2:  [95 %-100 %] 97 %  on  Flow (L/min):  [1-3] 1 O2 Device: nasal cannula with humidification    Intake/Output Summary (Last 24 hours) at 188  Last data filed at 18 1700   Gross per 24 hour   Intake              770 ml   Output            "  3075 ml   Net            -2305 ml     Flowsheet Rows         First Filed Value    Admission Height  170.2 cm (67\") Documented at 02/02/2018 2048    Admission Weight  74.8 kg (165 lb) Documented at 02/02/2018 2048        Body mass index is 24.87 kg/(m^2).  Last 3 weights    02/04/18  0557 02/05/18  0700 02/05/18  1109   Weight: 72.9 kg (160 lb 12.8 oz) 73.4 kg (161 lb 12.8 oz) 72 kg (158 lb 12.8 oz)       Physical Exam:  GEN:  No acute distress, alert, cooperative, well developed , On oxygen  EYES:   Sclera clear. No icterus. PERRL.   ENT:   External ears/nose normal, no oral lesions, no thrush, mucous membranes moist  NECK:  Supple, midline trachea  LUNGS: Normal chest on inspection, crackles BLL worse on the left, mild expiratory wheezes Respirations regular,non labored.   CV:  Irregular rhythm and normal rate. Normal S1/S2. No murmurs, gallops, or rubs noted.  ABD:  Soft, non-tender and non-distended. Normal bowel sounds. No guarding  EXT:  Moves all extremities well. No cyanosis. No redness. trace edema.   Skin: dry, intact, no bleeding    Results Review:        Results from last 7 days  Lab Units 02/04/18  0030 02/02/18 2107   SODIUM mmol/L  --  144   POTASSIUM mmol/L 4.6 4.8   CHLORIDE mmol/L  --  101   CO2 mmol/L  --  30.2*   BUN mg/dL  --  19   CREATININE mg/dL  --  1.16   CALCIUM mg/dL  --  9.5   AST (SGOT) U/L  --  57*   ALT (SGPT) U/L  --  33   ANION GAP mmol/L  --  12.8   ALBUMIN g/dL  --  3.90       Results from last 7 days  Lab Units 02/02/18 2107   TROPONIN T ng/mL 0.018           Results from last 7 days  Lab Units 02/02/18 2107   PROBNP pg/mL 6799.0*       Results from last 7 days  Lab Units 02/04/18  0818 02/02/18 2107   WBC 10*3/mm3 13.00* 14.48*   HEMOGLOBIN g/dL 12.6* 14.1   HEMATOCRIT % 37.8* 45.1   PLATELETS 10*3/mm3 188 207   MCV fL 97.7* 101.6*   NEUTROPHIL % % 76.7* 73.1   LYMPHOCYTE % % 13.2* 18.2*   MONOCYTES % % 9.6 7.0   EOSINOPHIL % % 0.2* 1.2   BASOPHIL % % 0.1 0.3   IMM GRAN % % " 0.2 0.2       Results from last 7 days  Lab Units 02/05/18  0539 02/04/18  0030 02/03/18  1455   INR  1.33* 2.08* 2.29*       Results from last 7 days  Lab Units 02/04/18  1234 02/04/18  0818 02/04/18  0030   MAGNESIUM mg/dL 2.5* 2.8* 1.5*                 No results found for: POCGLU    Results from last 7 days  Lab Units 02/04/18  0818 02/03/18  0211 02/02/18  2154 02/02/18  2107   PROCALCITONIN ng/mL 0.19  --   --  0.08*   LACTATE mmol/L  --  1.3 2.4*  --        Results from last 7 days  Lab Units 02/02/18  2206   BLOODCX    Staphylococcus, coagulase negative*  No growth at 2 days   GRAM STAIN RESULT  Anaerobic Bottle Gram positive cocci in clusters   BCIDPCR  Staphylococcus spp, not aureus. Identification by BCID PCR.*           Results from last 7 days  Lab Units 02/02/18 2208   ADENOVIRUS DETECTION BY PCR  Not Detected   CORONAVIRUS 229E  Not Detected   CORONAVIRUS HKU1  Not Detected   CORONAVIRUS NL63  Not Detected   CORONAVIRUS OC43  Not Detected   HUMAN METAPNEUMOVIRUS  Not Detected   HUMAN RHINOVIRUS/ENTEROVIRUS  Detected*   INFLUENZA B PCR  Not Detected   PARAINFLUENZA 1  Not Detected   PARAINFLUENZA VIRUS 2  Not Detected   PARAINFLUENZA VIRUS 3  Not Detected   PARAINFLUENZA VIRUS 4  Not Detected   BORDETELLA PERTUSSIS PCR  Not Detected   CHLAMYDOPHILA PNEUMONIAE PCR  Not Detected   MYCOPLAMA PNEUMO PCR  Not Detected   INFLUENZA A PCR  Not Detected   INFLUENZA A H3  Not Detected   INFLUENZA A H1  Not Detected   RSV, PCR  Not Detected           Imaging:   Imaging Results (all)     Procedure Component Value Units Date/Time    XR Chest 2 View [[id number]] Collected:  02/02/18 2126     Updated:  02/02/18 2126    Narrative:       CHEST PA AND LATERAL.     HISTORY: Shortness of breath.     COMPARISON: 08/28/2016.     FINDINGS:  Cardiomegaly, lung volumes are low. Severe pulmonary congestion has  developed, moderately large right pleural effusion.     Infiltrate at the right lung base cannot be excluded.               Impression:       Findings are suggestive of congestive heart failure, moderate right  pleural effusion. An infiltrate of the right lung base cannot be  excluded.                          I reviewed the patient's new clinical results.  I personally viewed and interpreted the patient's imaging results:Right pleural effusion with secondary atelectasis cannot rule out infection and infiltrate, positive cardiomegaly        Medication Review:     atorvastatin 40 mg Oral Daily   chlordiazePOXIDE 10 mg Oral Q8H   furosemide 40 mg Oral Daily   guaiFENesin 1,200 mg Oral Q12H   metoprolol succinate XL 50 mg Oral Q12H   pantoprazole 40 mg Oral QAM   potassium chloride 20 mEq Oral BID With Meals   traMADol 100 mg Oral BID            ASSESSMENT:   1. Diastolic congestive heart failure with exacerbation  2. Large right pleural effusion s/p thoracentesis on 2/5/18 with 2700 ml removed  3. Human rhinovirus infection  4. Mild pulmonary hypertension (mPAP = 27) from cath in 2016  5. Obstructive sleep apnea, mild, untreated,  6. Bronchospasm with wheezing,   7. Mild mitral and tricuspid valve regurgitation     PLAN:  Heavy alcohol consumption, at risk for DT, will continue librium and monitor with CIWA protocol  SOA significantly improved post thoracentesis.   Encouraged aggressive pulmonary hygiene measures with TCDB, mucinex and bronchodilators.   Add OPEP and IS   Continue bronchodilator, he still have some wheezes on exam.  Continue with the home diuretic dose at this point  Titrate oxygen to keep greater than 88% and wean off if tolerated. He does use nocturnal oxygen at home  CXR in am post thoracentesis    Discussed with the patient and family at bedside    Disposition: Home in am if still doing well.     Karina Grace MD  02/05/18  6:18 PM          EMR Dragon/Transcription:   Much of this encounter note is an electronic transcription/translation of spoken language to printed text. The electronic translation of spoken  language may permit erroneous, or at times, nonsensical words or phrases to be inadvertently transcribed; Although I have reviewed the note for such errors, some may still exist.

## 2018-02-06 ENCOUNTER — APPOINTMENT (OUTPATIENT)
Dept: GENERAL RADIOLOGY | Facility: HOSPITAL | Age: 83
End: 2018-02-06
Attending: INTERNAL MEDICINE

## 2018-02-06 VITALS
TEMPERATURE: 96.8 F | BODY MASS INDEX: 24.67 KG/M2 | WEIGHT: 157.2 LBS | HEIGHT: 67 IN | DIASTOLIC BLOOD PRESSURE: 63 MMHG | RESPIRATION RATE: 18 BRPM | HEART RATE: 62 BPM | SYSTOLIC BLOOD PRESSURE: 127 MMHG | OXYGEN SATURATION: 96 %

## 2018-02-06 PROBLEM — B34.9 VIRAL INFECTION: Status: ACTIVE | Noted: 2018-02-06

## 2018-02-06 PROBLEM — J90 PLEURAL EFFUSION, RIGHT: Status: ACTIVE | Noted: 2018-02-06

## 2018-02-06 LAB
INR PPP: 1.29 (ref 0.9–1.1)
PROTHROMBIN TIME: 15.9 SECONDS (ref 11.7–14.2)

## 2018-02-06 PROCEDURE — 85610 PROTHROMBIN TIME: CPT | Performed by: INTERNAL MEDICINE

## 2018-02-06 PROCEDURE — 71046 X-RAY EXAM CHEST 2 VIEWS: CPT

## 2018-02-06 PROCEDURE — 94799 UNLISTED PULMONARY SVC/PX: CPT

## 2018-02-06 RX ORDER — GUAIFENESIN 600 MG/1
1200 TABLET, EXTENDED RELEASE ORAL EVERY 12 HOURS SCHEDULED
Qty: 30 TABLET | Refills: 0 | Status: SHIPPED | OUTPATIENT
Start: 2018-02-06 | End: 2018-04-13

## 2018-02-06 RX ORDER — WARFARIN SODIUM 2.5 MG/1
2.5 TABLET ORAL
Status: DISCONTINUED | OUTPATIENT
Start: 2018-02-06 | End: 2018-02-06 | Stop reason: HOSPADM

## 2018-02-06 RX ORDER — IPRATROPIUM BROMIDE AND ALBUTEROL SULFATE 2.5; .5 MG/3ML; MG/3ML
3 SOLUTION RESPIRATORY (INHALATION) EVERY 6 HOURS PRN
Qty: 360 ML | Refills: 2 | Status: SHIPPED | OUTPATIENT
Start: 2018-02-06 | End: 2018-02-07

## 2018-02-06 RX ADMIN — CHLORDIAZEPOXIDE HYDROCHLORIDE 10 MG: 10 CAPSULE ORAL at 06:20

## 2018-02-06 RX ADMIN — FUROSEMIDE 40 MG: 40 TABLET ORAL at 10:25

## 2018-02-06 RX ADMIN — POTASSIUM CHLORIDE 20 MEQ: 750 CAPSULE, EXTENDED RELEASE ORAL at 10:25

## 2018-02-06 RX ADMIN — GUAIFENESIN 1200 MG: 600 TABLET, EXTENDED RELEASE ORAL at 10:23

## 2018-02-06 RX ADMIN — CHLORDIAZEPOXIDE HYDROCHLORIDE 10 MG: 10 CAPSULE ORAL at 14:31

## 2018-02-06 RX ADMIN — METOPROLOL SUCCINATE 50 MG: 50 TABLET, FILM COATED, EXTENDED RELEASE ORAL at 10:24

## 2018-02-06 RX ADMIN — TRAMADOL HYDROCHLORIDE 100 MG: 50 TABLET, FILM COATED ORAL at 10:23

## 2018-02-06 RX ADMIN — PANTOPRAZOLE SODIUM 40 MG: 40 TABLET, DELAYED RELEASE ORAL at 06:20

## 2018-02-06 RX ADMIN — ATORVASTATIN CALCIUM 40 MG: 20 TABLET, FILM COATED ORAL at 10:24

## 2018-02-06 NOTE — PLAN OF CARE
Problem: Fall Risk (Adult)  Goal: Absence of Falls  Outcome: Ongoing (interventions implemented as appropriate)      Problem: Patient Care Overview (Adult)  Goal: Plan of Care Review  Outcome: Ongoing (interventions implemented as appropriate)   02/06/18 0416   Coping/Psychosocial Response Interventions   Plan Of Care Reviewed With patient   Patient Care Overview   Progress improving   Outcome Evaluation   Outcome Summary/Follow up Plan CXR scheduled today. Pt refused O2 and was a little agitated last night. Pt continues on Librium. Pt refused sleep study. Dressing from thoracentesis yesterday is dry and intact. VSS. Safety maintained.       Problem: Cardiac: Heart Failure (Adult)  Goal: Signs and Symptoms of Listed Potential Problems Will be Absent or Manageable (Cardiac: Heart Failure)  Outcome: Ongoing (interventions implemented as appropriate)

## 2018-02-06 NOTE — NURSING NOTE
Exercise Oximetry    Patient Name:Harry Ventura   MRN: [id number]   Date: 02/06/18             ROOM AIR BASELINE   SpO2% 84   Heart Rate 97   Blood Pressure     EXERCISE ON ROOM AIR SpO2% EXERCISE ON O2 @ 4 LPM SpO2%   1 MINUTE 85 1 MINUTE    2 MINUTES  2 MINUTES 88   3 MINUTES  3 MINUTES 91   4 MINUTES  4 MINUTES 91   5 MINUTES  5 MINUTES 91   6 MINUTES  6 MINUTES 92              Distance Walked   Distance Walked   Dyspnea (Chely Scale)   Dyspnea (Chely Scale)   Fatigue (Chely Scale)   Fatigue (Chely Scale)   SpO2% Post Exercise   SpO2% Post Exercise 92   HR Post Exercise   HR Post Exercise   Time to Recovery   Time to Recovery     Comments: pt walked for 6 minutes around nurses station. Pt sats dropped to 84% on Room Air before leaving room. Pt on 2 liters and stared walking pt still 84-86%. Put patient on 3 liters still 86-87% walking. Put pt on 4 liters and was able to bet sats 88-90% while walking.

## 2018-02-06 NOTE — PROGRESS NOTES
Overnight oximetry study was attempted. Patient stated he just wanted to go to sleep. I explained to him that we wanted to monitor is oxygen saturation as he slept and he refused. He also refused O2 prior.

## 2018-02-06 NOTE — DISCHARGE SUMMARY
DISCHARGE SUMMARY    Patient Name: Harry Ventura  Age/Sex: 87 y.o. male  : 1930  MRN: [id number]  Patient Care Team:  Provider Not In System as PCP - General  NARINDER Blunt as PCP - Claims Attributed  Nidhi Juarez MD as Consulting Physician (Cardiology)  Karina Grace MD as Consulting Physician (Pulmonary Disease)  Delia Brooks MD PhD as Consulting Physician (Hematology and Oncology)  Erich Restrepo MD as Referring Physician (Family Medicine)       Date of Admit: 2018  Date of Discharge:  18  Discharge Condition: Good    Discharge Diagnoses:  1. Diastolic congestive heart failure with exacerbation  2. Large right pleural effusion s/p thoracentesis on 18 with 2700 ml removed  3. Human rhinovirus infection  4. Mild pulmonary hypertension (mPAP = 27) from cath in   5. Obstructive sleep apnea, mild, untreated,  6. Bronchospasm with wheezing,   7. Mild mitral and tricuspid valve regurgitation   8. Hx of atrial fibrillation on coumadin  9. Chronic alcohol use   10. Hx of CLL    History of present illness from H&P from 2018:   This is an 87-year-old male patient with history of diastolic CHF and moderate pulmonary hypertension.  He follows in our clinic and he was seen by Dr. Yu 2017.  I reviewed the note.  He has mild pulmonary hypertension which was attributed to cardiac issues.  Patient follows with Dr. Juarez for his cardiac issues including the pacemaker and CHF.     At baseline, he has dyspnea on exertion and chronic cough.  He reported worsening symptoms since yesterday with significant dyspnea.  His wife, who used to be a pediatric nurse, noted that he was breathing fast up to 50 times a minute.  She tried to listen to his heart which was having a lot of rhonchi.  Patient continued to have worsening shortness of breath on minimal exertion and even at rest.  EMS was called and he was transferred to the ED.  He does take Bumex for CHF and denies skipping  medications.  He denies leg swelling or orthopnea.     In ED, he had a CXR which was suggestive of effusion and possible pneumonia.  He received antibiotics for suspected pneumonia and Lasix 40 mg which resulted in mild drop in his blood pressure     Labs reviewed:  ProBNP = 6799; serum bicarbonate = 30; lactic acid = 2.4; pro-calcitonin = 0.08; WBC = 14.5 K; platelets = 207;  Respiratory viral panel positive for human rhinovirus    Hospital Course:   Harry Ventura presented to [facility] and was admitted on 2/2/18 with shortness of breath and wheezing. He was found to have moderate to large right-sided effusion.  He tested positive for the Rhino virus and was treated with bronchodilators. He was supposed to have thoracentesis however his INR was in the therapeutic range and the procedure was postponed and coumadin was held in the mean time. Right sided thoracentesis on 2/5/18 with 2700 ml removed and he was weaned off oxygen. Coumadin was resumed. He does have a history of drinking at least 4 liquor drinks per day and was librium while in the hospital to help with any withdrawal symptoms. Patient has no desire to quit drinking and was advised to quit and resources were given if he chooses to quit in the future. Blood cultures showed staph, but was confirmed as possible contamination by BCID. Refused overnight oximetry study. Walking oximetry showed hypoxemia, requiring 4 lpm to correct , to use O2 with exertion and at night while asleep, at 4 lpm    Consults:   IP CONSULT TO PULMONOLOGY  IP CONSULT TO NUTRITION SERVICES    Significant Discharge Diagnostics   Procedures Performed:  thoracentesis       Pertinent Lab Results:    Results from last 7 days  Lab Units 02/04/18  0030 02/02/18  2107   SODIUM mmol/L  --  144   POTASSIUM mmol/L 4.6 4.8   CHLORIDE mmol/L  --  101   CO2 mmol/L  --  30.2*   BUN mg/dL  --  19   CREATININE mg/dL  --  1.16   GLUCOSE mg/dL  --  181*   CALCIUM mg/dL  --  9.5    AST (SGOT) U/L  --  57*   ALT (SGPT) U/L  --  33       Results from last 7 days  Lab Units 02/02/18  2107   TROPONIN T ng/mL 0.018       Results from last 7 days  Lab Units 02/04/18  0818 02/02/18  2107   WBC 10*3/mm3 13.00* 14.48*   HEMOGLOBIN g/dL 12.6* 14.1   HEMATOCRIT % 37.8* 45.1   PLATELETS 10*3/mm3 188 207   MCV fL 97.7* 101.6*   MCH pg 32.6 31.8   MCHC g/dL 33.3 31.3*   RDW % 14.3 14.4   RDW-SD fl 50.8 53.9   MPV fL 10.9 10.4   NEUTROPHIL % % 76.7* 73.1   LYMPHOCYTE % % 13.2* 18.2*   MONOCYTES % % 9.6 7.0   EOSINOPHIL % % 0.2* 1.2   BASOPHIL % % 0.1 0.3   IMM GRAN % % 0.2 0.2   NEUTROS ABS 10*3/mm3 9.98* 10.59*   LYMPHS ABS 10*3/mm3 1.71 2.63   MONOS ABS 10*3/mm3 1.25* 1.01   EOS ABS 10*3/mm3 0.03 0.18   BASOS ABS 10*3/mm3 0.01 0.04   IMMATURE GRANS (ABS) 10*3/mm3 0.02 0.03       Results from last 7 days  Lab Units 02/06/18  0330 02/05/18  0539 02/04/18  0030 02/03/18  1455 02/02/18  2107   INR  1.29* 1.33* 2.08* 2.29* 2.15*       Results from last 7 days  Lab Units 02/04/18  1234 02/04/18  0818 02/04/18  0030   MAGNESIUM mg/dL 2.5* 2.8* 1.5*           Results from last 7 days  Lab Units 02/02/18  2107   PROBNP pg/mL 6799.0*               Results from last 7 days  Lab Units 02/04/18  0818 02/03/18  0211 02/02/18  2154 02/02/18  2107   PROCALCITONIN ng/mL 0.19  --   --  0.08*   LACTATE mmol/L  --  1.3 2.4*  --                Results from last 7 days  Lab Units 02/02/18  2206   BLOODCX  No growth at 3 days  Staphylococcus, coagulase negative*   GRAM STAIN RESULT  Anaerobic Bottle Gram positive cocci in clusters   BCIDPCR  Staphylococcus spp, not aureus. Identification by BCID PCR.*           Results from last 7 days  Lab Units 02/02/18 2208   ADENOVIRUS DETECTION BY PCR  Not Detected   CORONAVIRUS 229E  Not Detected   CORONAVIRUS HKU1  Not Detected   CORONAVIRUS NL63  Not Detected   CORONAVIRUS OC43  Not Detected   HUMAN METAPNEUMOVIRUS  Not Detected   HUMAN RHINOVIRUS/ENTEROVIRUS  Detected*   INFLUENZA B  PCR  Not Detected   PARAINFLUENZA 1  Not Detected   PARAINFLUENZA VIRUS 2  Not Detected   PARAINFLUENZA VIRUS 3  Not Detected   PARAINFLUENZA VIRUS 4  Not Detected   BORDETELLA PERTUSSIS PCR  Not Detected   CHLAMYDOPHILA PNEUMONIAE PCR  Not Detected   MYCOPLAMA PNEUMO PCR  Not Detected   INFLUENZA A PCR  Not Detected   INFLUENZA A H3  Not Detected   INFLUENZA A H1  Not Detected   RSV, PCR  Not Detected           Walking oximetry on RA: 2/6/18      Imaging Results:  Imaging Results (all)     Procedure Component Value Units Date/Time    XR Chest 2 View [[id number]] Collected:  02/02/18 2126     Updated:  02/02/18 2126    Narrative:       CHEST PA AND LATERAL.     HISTORY: Shortness of breath.     COMPARISON: 08/28/2016.     FINDINGS:  Cardiomegaly, lung volumes are low. Severe pulmonary congestion has  developed, moderately large right pleural effusion.     Infiltrate at the right lung base cannot be excluded.              Impression:       Findings are suggestive of congestive heart failure, moderate right  pleural effusion. An infiltrate of the right lung base cannot be  excluded.           US Thoracentesis [[id number]] Collected:  02/05/18 1039    Specimen:  Body Fluid Updated:  02/05/18 1042    Narrative:       ULTRASOUND-GUIDED THORACENTESIS RIGHT PLEURAL CAVITY     History: Male who is 87 years-old, with pleural effusion. Thoracentesis  was requested.     Procedure, including risk of hemorrhage, infection, recurrence,  pneumothorax, chest tube placement discussed with the patient prior to  examination. Consent was obtained.     Procedure: Patient was allowed to remain in sitting upright position and  was approached posteriorly with real-time ultrasound guidance.  Localization was performed. Confirmatory images were obtained.     The patient was prepped in the usual sterile fashion. Following adequate  local anesthesia with 1% lidocaine, a thoracentesis needle set was  advanced to the posterior aspect of the right  pleural cavity.     A total of 2700 cc of pleural fluid was aspirated into the Vacutainer  without difficulty nor immediate post procedure complication.     This report was finalized on 2/5/2018 10:39 AM by Dr. Christos Barlow MD.   Results for LAYTON ROSE (MRN [id number]) as of 2/6/2018 09:06   Ref. Range 2/5/2018 09:23   Color, Fluid Unknown Yellow   Appearance, Fluid Latest Ref Range: Clear  Clear   Lymphocytes, Fluid Latest Units: % 86   Monocytes, Fluid Latest Units: % 4   Neutrophils, Fluid Latest Units: % 3   RBC, Fluid Latest Units: /mm3 31   WBC, Fluid Latest Units: /mm3 101             Objective:   Temp:  [96.1 °F (35.6 °C)-96.9 °F (36.1 °C)] 96.8 °F (36 °C)  Heart Rate:  [61-68] 62  Resp:  [18] 18  BP: (116-152)/(56-78) 127/63   SpO2:  [90 %-100 %] 96 %  on  Flow (L/min):  [1-4] 4 O2 Device: nasal cannula with humidification    Intake/Output Summary (Last 24 hours) at 02/06/18 1426  Last data filed at 02/06/18 1321   Gross per 24 hour   Intake             1100 ml   Output              450 ml   Net              650 ml     Body mass index is 24.62 kg/(m^2).  Last 3 weights    02/05/18  0700 02/05/18  1109 02/06/18  0625   Weight: 73.4 kg (161 lb 12.8 oz) 72 kg (158 lb 12.8 oz) 71.3 kg (157 lb 3.2 oz)     Weight change: -1.361 kg (-3 lb)    Physical Exam:  GEN:  No acute distress, alert, cooperative, well developed, on room air maintaining saturations at rest  EYES:   Sclera clear. No icterus. PERRL.   ENT:   External ears/nose normal, no oral lesions, no thrush, mucous membranes moist  NECK:  Supple, midline trachea  LUNGS: Normal chest on inspection, improved BLL breath sounds with no more crackles, mild expiratory wheezes Respirations regular,non labored.   CV:  Irregular rhythm and normal rate. Normal S1/S2. No murmurs, gallops, or rubs noted.  ABD:  Soft, non-tender and non-distended. Normal bowel sounds. No guarding  EXT:  Moves all extremities well. No cyanosis. No redness. trace edema.   Skin: dry,  intact, no bleeding    Discharge Medications and Instructions:     Discharge Medications   Harry Ventura   Home Medication Instructions ECTOR:[id number]    Printed on:02/06/18 0997   Medication Information                      aspirin 81 MG EC tablet  Take 81 mg by mouth Daily.             atorvastatin (LIPITOR) 40 MG tablet  Take 40 mg by mouth Daily.             furosemide (LASIX) 40 MG tablet  Take 40 mg by mouth Daily.             guaiFENesin (MUCINEX) 600 MG 12 hr tablet  Take 2 tablets by mouth Every 12 (Twelve) Hours.             ipratropium-albuterol (DUO-NEB) 0.5-2.5 mg/mL nebulizer  Take 3 mL by nebulization Every 6 (Six) Hours As Needed for Wheezing or Shortness of Air.             metoprolol succinate XL (TOPROL-XL) 50 MG 24 hr tablet  Take 1 tablet by mouth 2 (Two) Times a Day.             omeprazole (priLOSEC) 20 MG capsule  Take 20 mg by mouth Daily.             potassium chloride (K-DUR,KLOR-CON) 10 MEQ CR tablet  Take 1 tablet by mouth 2 (Two) Times a Day.             traMADol (ULTRAM) 50 MG tablet  Take 100 mg by mouth 2 (Two) Times a Day.             valACYclovir (VALTREX) 500 MG tablet  Take 500 mg by mouth 2 (Two) Times a Day As Needed (For fever blisters).             warfarin (COUMADIN) 2.5 MG tablet  Take 2.5 mg by mouth See Admin Instructions. Daily or as directed by physician                 Discharge Diet:         Dietary Orders            Start     Ordered    02/05/18 1800  Dietary Nutrition Supplements Ensure Enlive  Daily With Breakfast & Dinner     Comments:  Chocolate   Question:  Select Supplement:  Answer:  Ensure Enlive    02/05/18 1117    02/05/18 1027  Diet Regular; Cardiac  Diet Effective Now     Question Answer Comment   Diet Texture / Consistency Regular    Common Modifiers Cardiac        02/05/18 1026          Activity at Discharge:    Activity Instructions     Activity as Tolerated                      Discharge disposition: Home    Discharge Instructions and Follow  ups:  Follow up CXR in 4 weeks to assess for any fluid accumulation.   INR on 2/8/18 and follow up with ordering provider    Follow-up Information     Follow up with [facility] Follow up in 4 week(s).    Why:  with repeat CXR     Contact information:    [address], [address]  [facility] [address]  [phone]        Follow up with NARINDER Garcia .    Specialty:  Internal Medicine        Follow up with Dave Lopez MD. Go on 2/14/2018.    Specialty:  Internal Medicine    Why:  arrive at 09:00    Contact information:    [address]  [facility] [address]  [phone]          Future Appointments  Date Time Provider Department Center   2/14/2018 9:20 AM Dave Lopez MD [facility] None   2/27/2018 12:30 PM LAB CHAIR 6 [facility]   2/27/2018 1:00 PM Delia Brooks MD PhD [facility]  [facility]   4/9/2018 1:40 PM Nidhi Juarez MD [facility] None   4/30/2018 12:10 AM JANNET BILLY, L[facility] None        Medication Reconciliation: Please see electronically completed Med Rec.    Total time spent discharging patient including evaluation, medication reconciliation, arranging follow up, and post hospitalization instructions and education total time exceeds 30 minutes.     Karina Grace MD  02/06/18  2:26 PM    EMR Dragon/Transcription:   Much of this encounter note is an electronic transcription/translation of spoken language to printed text. The electronic translation of spoken language may permit erroneous, or at times, nonsensical words or phrases to be inadvertently transcribed; Although I have reviewed the note for such errors, some may still exist.

## 2018-02-07 PROBLEM — J44.9 CHRONIC OBSTRUCTIVE PULMONARY DISEASE (COPD) (HCC): Status: ACTIVE | Noted: 2018-02-07

## 2018-02-07 LAB — BACTERIA SPEC AEROBE CULT: NORMAL

## 2018-02-07 RX ORDER — IPRATROPIUM BROMIDE AND ALBUTEROL SULFATE 2.5; .5 MG/3ML; MG/3ML
3 SOLUTION RESPIRATORY (INHALATION) EVERY 6 HOURS PRN
Qty: 360 ML | Refills: 2 | Status: SHIPPED | OUTPATIENT
Start: 2018-02-07 | End: 2018-04-13

## 2018-02-07 NOTE — PROGRESS NOTES
Case Management Discharge Note    Final Note: Discharged home with wife.  Marian denied Nubulizer however wife states she has one.  Oxygen order updated for Marian    Discharge Placement     No information found        Other: Other    Discharge Codes: 01  Discharge to home

## 2018-02-16 ENCOUNTER — OFFICE VISIT (OUTPATIENT)
Dept: FAMILY MEDICINE CLINIC | Facility: CLINIC | Age: 83
End: 2018-02-16

## 2018-02-16 VITALS
RESPIRATION RATE: 17 BRPM | BODY MASS INDEX: 25.08 KG/M2 | WEIGHT: 159.8 LBS | SYSTOLIC BLOOD PRESSURE: 120 MMHG | TEMPERATURE: 97.5 F | DIASTOLIC BLOOD PRESSURE: 60 MMHG | OXYGEN SATURATION: 100 % | HEIGHT: 67 IN | HEART RATE: 60 BPM

## 2018-02-16 DIAGNOSIS — D63.1 ANEMIA OF CHRONIC RENAL FAILURE, STAGE 3 (MODERATE) (HCC): ICD-10-CM

## 2018-02-16 DIAGNOSIS — I50.9 CONGESTIVE HEART FAILURE, UNSPECIFIED CONGESTIVE HEART FAILURE CHRONICITY, UNSPECIFIED CONGESTIVE HEART FAILURE TYPE: ICD-10-CM

## 2018-02-16 DIAGNOSIS — J44.9 CHRONIC OBSTRUCTIVE PULMONARY DISEASE, UNSPECIFIED COPD TYPE (HCC): ICD-10-CM

## 2018-02-16 DIAGNOSIS — E83.42 HYPOMAGNESEMIA: ICD-10-CM

## 2018-02-16 DIAGNOSIS — I27.20 PULMONARY HYPERTENSION (HCC): ICD-10-CM

## 2018-02-16 DIAGNOSIS — I50.32 CHRONIC DIASTOLIC CONGESTIVE HEART FAILURE (HCC): ICD-10-CM

## 2018-02-16 DIAGNOSIS — E53.8 VITAMIN B12 DEFICIENCY: ICD-10-CM

## 2018-02-16 DIAGNOSIS — Z85.6 PERSONAL HISTORY OF CLL (CHRONIC LYMPHOCYTIC LEUKEMIA): ICD-10-CM

## 2018-02-16 DIAGNOSIS — N18.30 ANEMIA OF CHRONIC RENAL FAILURE, STAGE 3 (MODERATE) (HCC): ICD-10-CM

## 2018-02-16 DIAGNOSIS — I25.10 CORONARY ARTERY DISEASE INVOLVING NATIVE CORONARY ARTERY OF NATIVE HEART WITHOUT ANGINA PECTORIS: ICD-10-CM

## 2018-02-16 DIAGNOSIS — G45.9 TRANSIENT CEREBRAL ISCHEMIA, UNSPECIFIED TYPE: ICD-10-CM

## 2018-02-16 DIAGNOSIS — I48.91 ATRIAL FIBRILLATION, UNSPECIFIED TYPE (HCC): Primary | ICD-10-CM

## 2018-02-16 DIAGNOSIS — K21.9 GASTROESOPHAGEAL REFLUX DISEASE WITHOUT ESOPHAGITIS: ICD-10-CM

## 2018-02-16 DIAGNOSIS — I10 ESSENTIAL HYPERTENSION: ICD-10-CM

## 2018-02-16 PROCEDURE — 99214 OFFICE O/P EST MOD 30 MIN: CPT | Performed by: FAMILY MEDICINE

## 2018-02-16 NOTE — PROGRESS NOTES
HPI  Harry Ventura is a 87 y.o. male who is here to establish a new primary care physician.  Reportedly previous office closed.  Patient has extensive history with multiple diagnosis is as noted below.  Was recently hospitalized and reportedly had 2 L of fluid removed from his long.  His wife indicates he actually left the hospital earlier than physicians had recommended.  Has known pulmonary hypertension as well as congestive heart failure and other issues.  Is on 24-hour oxygen though wife reports he was off oxygen a few hours recently and saturations remained close to 100?  Reviewed hospital records and reportedly fluid felt to be related to congestive heart failure.  Cardiologist had recommended Lasix 80 mg daily but patient has only been taking 40 because of urinary symptoms including frequency and urgency.  Health history form reviewed as well as extensive hospital records including discharge summary.      Review of Systems   Constitutional: Positive for fatigue and unexpected weight change.   HENT: Positive for congestion.    Respiratory: Positive for cough and shortness of breath.    Cardiovascular: Positive for leg swelling.        Extensive cardiac history including heart failure pulmonary hypertension atrial fibrillation and pacemaker   Endocrine: Positive for cold intolerance.   Neurological: Positive for weakness.   Hematological:        Known history CLL followed by hematology once a year.  Is on warfarin for chronic atrial fibrillation and has a pacemaker in place         Past Medical History:   Diagnosis Date   • Accelerated essential hypertension    • Anemia     Mild   • Atrial fibrillation    • CAP (community acquired pneumonia)    • CHF (congestive heart failure)    • Chronic anticoagulation    • Chronic diastolic CHF (congestive heart failure)    • CLL (chronic lymphocytic leukemia)     Stage 0   • Coronary artery disease    • ED (erectile dysfunction) of non-organic origin    • Former smoker     • Gait instability    • GERD (gastroesophageal reflux disease)    • Glucose intolerance (malabsorption)    • H/O Thrombocytopenia     Mild   • Hematuria    • History of EKG 08/28/2016   • Hypercholesterolemia    • Hypertension     Pulmonary   • Hypogonadism male    • Insomnia    • Iron overload    • Low back pain    • Macrocytosis    • Personal history of CLL (chronic lymphocytic leukemia)    • Personal history of venous thrombosis and embolism    • Proteinuria    • Renal insufficiency    • Seborrheic dermatitis        Past Surgical History:   Procedure Laterality Date   • CARDIAC CATHETERIZATION N/A 6/17/2016    Procedure: Right and Left Heart Cath;  Surgeon: Harry Bolton MD;  Location: [facility] CATH INVASIVE LOCATION;  Service:    • CARDIAC CATHETERIZATION N/A 6/17/2016    Procedure: Coronary angiography;  Surgeon: Harry Bolton MD;  Location: [facility] CATH INVASIVE LOCATION;  Service:    • CARDIAC CATHETERIZATION N/A 6/17/2016    Procedure: Left Heart Cath;  Surgeon: Harry Bolton MD;  Location: [facility] CATH INVASIVE LOCATION;  Service:    • CARDIAC CATHETERIZATION N/A 6/17/2016    Procedure: Left ventriculography;  Surgeon: Harry Bolton MD;  Location: [facility] CATH INVASIVE LOCATION;  Service:    • CHOLECYSTECTOMY  1983   • INSERT / REPLACE / REMOVE PACEMAKER     • UMBILICAL HERNIA REPAIR  2006    Performed laparoscopically by Dr. Garcia.       Family History   Problem Relation Age of Onset   • Emphysema Father    • Emphysema Sister    • Coronary artery disease Other    • No Known Problems Mother    • Liver cancer Paternal Uncle 55   • Liver cancer Paternal Uncle 74   • Liver cancer Paternal Uncle 79       Social History     Social History   • Marital status:      Spouse name: Elena   • Number of children: N/A   • Years of education: N/A     Occupational History   • Retired      Worked in sales for [employer].     Social History Main Topics   • Smoking status: Former Smoker   • Smokeless tobacco: Never Used       Comment: Quit smoking over 30 years ago. //  caffeine use   • Alcohol use Yes      Comment: Three drinks a day wine or vodka, reporting 28 drinks a week.    • Drug use: No   • Sexual activity: Yes     Partners: Female     Other Topics Concern   • Not on file     Social History Narrative    Stays fairly active for his age, walking and golfing.          Physical Exam   Constitutional: He is oriented to person, place, and time. He appears well-developed and well-nourished. No distress.   HENT:   Head: Atraumatic.   Nose: Nose normal.   Mouth/Throat: Oropharynx is clear and moist.   Eyes: Conjunctivae are normal. Pupils are equal, round, and reactive to light.   Neck: JVD present.   Cardiovascular: Normal rate.    Pulmonary/Chest: Effort normal. No respiratory distress. He has decreased breath sounds in the right upper field, the right middle field and the right lower field.   Nasal oxygen at 2 L   Musculoskeletal: He exhibits edema.   Relates with a cane   Lymphadenopathy:     He has no cervical adenopathy.   Neurological: He is alert and oriented to person, place, and time. Coordination normal.   Skin: Skin is warm and dry.   Psychiatric: He has a normal mood and affect. His behavior is normal. Judgment and thought content normal.   Vitals reviewed.        Assessment/Plan    Harry was seen today for transient ischemic attack and congestive heart failure.    Diagnoses and all orders for this visit:    Atrial fibrillation, unspecified type    Chronic diastolic congestive heart failure    Congestive heart failure, unspecified congestive heart failure chronicity, unspecified congestive heart failure type  -     XR Chest PA & Lateral; Future    Coronary artery disease involving native coronary artery of native heart without angina pectoris    Transient cerebral ischemia, unspecified type    Essential hypertension    Gastroesophageal reflux disease without esophagitis    Vitamin B12 deficiency    Anemia of chronic renal  failure, stage 3 (moderate)  -     CBC & Differential  -     Basic Metabolic Panel    Personal history of CLL (chronic lymphocytic leukemia)    Chronic obstructive pulmonary disease, unspecified COPD type  -     XR Chest PA & Lateral; Future    Pulmonary hypertension  -     XR Chest PA & Lateral; Future    Hypomagnesemia  -     Magnesium      Patient with multiple medical problems here to establish a new primary care physician.  Recent hospitalization for pleural effusion and apparently had 2 L of fluid removed by thoracentesis.  Exam indicates probable recurrence of pleural effusion and will get repeat chest x-ray this weekend as discussed.  Does seem fairly asymptomatic except for some weakness and expected shortness of air.  Also recheck above lab work.  Has follow-up appointment with pulmonary specialist in 3 weeks and cardiologist in April.  Tentative follow-up here in one month.    This note includes information entered using a voice recognition dictation system.  Though reviewed, some nonsensible errors may remain.

## 2018-02-17 LAB
BASOPHILS # BLD AUTO: 0.1 X10E3/UL (ref 0–0.2)
BASOPHILS NFR BLD AUTO: 1 %
BUN SERPL-MCNC: 27 MG/DL (ref 8–27)
BUN/CREAT SERPL: 22 (ref 10–24)
CALCIUM SERPL-MCNC: 9.3 MG/DL (ref 8.6–10.2)
CHLORIDE SERPL-SCNC: 98 MMOL/L (ref 96–106)
CO2 SERPL-SCNC: 26 MMOL/L (ref 18–29)
CREAT SERPL-MCNC: 1.21 MG/DL (ref 0.76–1.27)
EOSINOPHIL # BLD AUTO: 0.3 X10E3/UL (ref 0–0.4)
EOSINOPHIL NFR BLD AUTO: 3 %
ERYTHROCYTE [DISTWIDTH] IN BLOOD BY AUTOMATED COUNT: 13.8 % (ref 12.3–15.4)
GFR SERPLBLD CREATININE-BSD FMLA CKD-EPI: 54 ML/MIN/1.73
GFR SERPLBLD CREATININE-BSD FMLA CKD-EPI: 62 ML/MIN/1.73
GLUCOSE SERPL-MCNC: 106 MG/DL (ref 65–99)
HCT VFR BLD AUTO: 37.8 % (ref 37.5–51)
HGB BLD-MCNC: 12.3 G/DL (ref 13–17.7)
IMM GRANULOCYTES # BLD: 0 X10E3/UL (ref 0–0.1)
IMM GRANULOCYTES NFR BLD: 0 %
LYMPHOCYTES # BLD AUTO: 1.5 X10E3/UL (ref 0.7–3.1)
LYMPHOCYTES NFR BLD AUTO: 16 %
MAGNESIUM SERPL-MCNC: 1.6 MG/DL (ref 1.6–2.3)
MCH RBC QN AUTO: 31 PG (ref 26.6–33)
MCHC RBC AUTO-ENTMCNC: 32.5 G/DL (ref 31.5–35.7)
MCV RBC AUTO: 95 FL (ref 79–97)
MONOCYTES # BLD AUTO: 0.9 X10E3/UL (ref 0.1–0.9)
MONOCYTES NFR BLD AUTO: 10 %
NEUTROPHILS # BLD AUTO: 6.5 X10E3/UL (ref 1.4–7)
NEUTROPHILS NFR BLD AUTO: 70 %
PLATELET # BLD AUTO: 258 X10E3/UL (ref 150–379)
POTASSIUM SERPL-SCNC: 4.3 MMOL/L (ref 3.5–5.2)
RBC # BLD AUTO: 3.97 X10E6/UL (ref 4.14–5.8)
SODIUM SERPL-SCNC: 141 MMOL/L (ref 134–144)
WBC # BLD AUTO: 9.2 X10E3/UL (ref 3.4–10.8)

## 2018-02-18 ENCOUNTER — HOSPITAL ENCOUNTER (OUTPATIENT)
Dept: GENERAL RADIOLOGY | Facility: HOSPITAL | Age: 83
Discharge: HOME | End: 2018-02-18
Attending: FAMILY MEDICINE | Admitting: FAMILY MEDICINE

## 2018-02-18 DIAGNOSIS — I50.9 CONGESTIVE HEART FAILURE, UNSPECIFIED CONGESTIVE HEART FAILURE CHRONICITY, UNSPECIFIED CONGESTIVE HEART FAILURE TYPE: ICD-10-CM

## 2018-02-18 DIAGNOSIS — J44.9 CHRONIC OBSTRUCTIVE PULMONARY DISEASE, UNSPECIFIED COPD TYPE (HCC): ICD-10-CM

## 2018-02-18 DIAGNOSIS — I27.20 PULMONARY HYPERTENSION (HCC): ICD-10-CM

## 2018-02-18 PROCEDURE — 71046 X-RAY EXAM CHEST 2 VIEWS: CPT

## 2018-02-19 ENCOUNTER — TELEPHONE (OUTPATIENT)
Dept: CARDIOLOGY | Facility: CLINIC | Age: 83
End: 2018-02-19

## 2018-02-19 ENCOUNTER — PATIENT OUTREACH (OUTPATIENT)
Dept: CASE MANAGEMENT | Facility: OTHER | Age: 83
End: 2018-02-19

## 2018-02-19 DIAGNOSIS — J90 PLEURAL EFFUSION ON RIGHT: Primary | ICD-10-CM

## 2018-02-22 ENCOUNTER — HOSPITAL ENCOUNTER (OUTPATIENT)
Dept: ULTRASOUND IMAGING | Facility: HOSPITAL | Age: 83
Discharge: HOME | End: 2018-02-22
Attending: INTERNAL MEDICINE | Admitting: INTERNAL MEDICINE

## 2018-02-22 VITALS
WEIGHT: 160 LBS | OXYGEN SATURATION: 100 % | HEIGHT: 67 IN | RESPIRATION RATE: 16 BRPM | TEMPERATURE: 97.2 F | BODY MASS INDEX: 25.11 KG/M2 | HEART RATE: 60 BPM | DIASTOLIC BLOOD PRESSURE: 70 MMHG | SYSTOLIC BLOOD PRESSURE: 156 MMHG

## 2018-02-22 DIAGNOSIS — J90 PLEURAL EFFUSION ON RIGHT: ICD-10-CM

## 2018-02-22 LAB
APPEARANCE FLD: CLEAR
COLOR FLD: YELLOW
EOSINOPHIL NFR FLD MANUAL: 32 %
GLUCOSE FLD-MCNC: 109 MG/DL
INR PPP: 1.4 (ref 0.8–1.2)
LDH FLD-CCNC: 100 U/L
LYMPHOCYTES NFR FLD MANUAL: 54 %
MONOCYTES NFR FLD: 8 %
MONOS+MACROS NFR FLD: 6 %
PROT FLD-MCNC: 3.4 G/DL
PROTHROMBIN TIME: 16.3 SECONDS (ref 12.8–15.2)
RBC # FLD AUTO: 13 /MM3
WBC # FLD: 248 /MM3

## 2018-02-22 PROCEDURE — 87206 SMEAR FLUORESCENT/ACID STAI: CPT | Performed by: INTERNAL MEDICINE

## 2018-02-22 PROCEDURE — 82945 GLUCOSE OTHER FLUID: CPT | Performed by: INTERNAL MEDICINE

## 2018-02-22 PROCEDURE — 83615 LACTATE (LD) (LDH) ENZYME: CPT | Performed by: INTERNAL MEDICINE

## 2018-02-22 PROCEDURE — 89051 BODY FLUID CELL COUNT: CPT | Performed by: INTERNAL MEDICINE

## 2018-02-22 PROCEDURE — 76942 ECHO GUIDE FOR BIOPSY: CPT

## 2018-02-22 PROCEDURE — 87116 MYCOBACTERIA CULTURE: CPT | Performed by: INTERNAL MEDICINE

## 2018-02-22 PROCEDURE — 88305 TISSUE EXAM BY PATHOLOGIST: CPT | Performed by: INTERNAL MEDICINE

## 2018-02-22 PROCEDURE — 84157 ASSAY OF PROTEIN OTHER: CPT | Performed by: INTERNAL MEDICINE

## 2018-02-22 PROCEDURE — 88112 CYTOPATH CELL ENHANCE TECH: CPT | Performed by: INTERNAL MEDICINE

## 2018-02-22 RX ORDER — LIDOCAINE HYDROCHLORIDE 10 MG/ML
20 INJECTION, SOLUTION INFILTRATION; PERINEURAL ONCE
Status: COMPLETED | OUTPATIENT
Start: 2018-02-22 | End: 2018-02-22

## 2018-02-22 RX ADMIN — LIDOCAINE HYDROCHLORIDE 9 ML: 10 INJECTION, SOLUTION INFILTRATION; PERINEURAL at 15:11

## 2018-02-22 NOTE — DISCHARGE INSTRUCTIONS
EDUCATION /DISCHARGE INSTRUCTIONS Thoracentesis:  A thoracentesis is a procedure to remove fluid or air from the space between the lung and it's lining.  It is done to relieve lung compression and respiratory distress.  A sample can also be obtained to aid diagnosis.   Medications can be administered into the space.      During the procedure:  You will be seated comfortable leaning forward onto a pillow supported by a table.  The area will be cleaned with antiseptic soap and draped with a sterile towel.  The physician will administer a local antiseptic to numb the area.  Next, the physician will insert a needle with tubing attached into the space between the lung and lining.  Fluid is removed and a sample sent to the laboratory.   When completed a pressure dressing is applied to the site.  A chest x-ray is performed to ensure the lung is functioning properly.    Risks of the procedure include but are not limited to:   *  Bleeding    *  Low blood pressure *  Infection     *  Lung collapse possibly requiring insertion of a tube to re-inflate the lung.    Benefits of the procedure:  Relief of respiratory distress and facilitation of a diagnosis.    Alternatives to the procedure:  Drug therapy with diuretics to remove fluid.  Risks of diuretic drug therapy include possible dehydration and renal failure.  The benefit of drug therapy is that it can be done at home under physician supervision.  THIS EDUCATION INFORMATION WAS REVIEWED PRIOR TO THE PROCEDURE AND CONSENT. Patient initials___________________Time__________________    Post Procedure:    *  Rest today (no pushing pulling, straining or heavy lifting).   *  Slowly increase activity tomorow.    *  If you received sedation do not drive for 24 hours.   *  Keep dressing clean and dry.   *  Leave dressing on puncture site for 24 hours.    *  You may shower when dressing removed.   *  Expect some coughing as the lung re-expands.    Call your doctor if experiencing:   *   If experiencing sudden / severe shortness of breath, chest pain or if coughing       up blood go to the nearest emergency room.   *  Signs of infection such as redness, swelling, excessive pain and / or foul       smelling drainage from the puncture site.   *  Chills or fever over 101 degrees (by mouth).   *  Change in sputum color (yellow, green, red).   *  Unrelieved pain.   *  Any new or severe symptoms.  Following the procedure:     Follow-up with the ordering physician as directed.    Continue to take other medications as directed by your physician unless    otherwise instructed.   If applicable, resume taking your blood thinners or Aspirin on ___________.    If you have any concerns please call the Radiology Nurses Desk at [phone].  You are the most important factor in your recovery.  Follow the above instructions carefully.

## 2018-02-22 NOTE — NURSING NOTE
Status post thoracentesis with dressing to right upper back dry and intact. Offered lunch but declined.

## 2018-02-22 NOTE — H&P (VIEW-ONLY)
PROGRESS NOTE  Patient Name: Harry Ventura  Age/Sex: 87 y.o. male  : 1930  MRN: [id number]    Date of Admission: 2018  Date of Encounter Visit: 18   LOS: 3 days   Patient Care Team:  Provider Not In System as PCP - General  NARINDER Blunt as PCP - Claims Attributed  Nidhi Juarez MD as Consulting Physician (Cardiology)  Karina Grace MD as Consulting Physician (Pulmonary Disease)  Delia Brooks MD PhD as Consulting Physician (Hematology and Oncology)  Erich Restrepo MD as Referring Physician (Family Medicine)    Chief Complaint: Shortness of breath    Interval History: Patient was admitted on 18 with shortness of breath and wheezing was found to have moderate to large right-sided effusion.  He tested positive for the common cold virus, patient has bronchodilator and he was supposed to have thoracentesis however his INR was in the therapeutic range and the procedure was postponed. Right sided thoracentesis on 18 with 2700 ml removed.     Patient did admit to drinking 4 alcoholic beverages a day usually vodka-based.    REVIEW OF SYSTEMS:   CARDIOVASCULAR: No chest pain, chest pressure or chest discomfort. No palpitations. edema  RESPIRATORY: Shortness of breath improved since thoracentesis. Cough with thick secretions and unable to expectorate.    GASTROINTESTINAL: No anorexia, nausea, vomiting or diarrhea. No abdominal pain or blood.   HEMATOLOGIC: No bleeding or bruising.   No fever or chills.   Great urine output     Ventilator/Non-Invasive Ventilation Settings     None        Vital Signs  Temp:  [95.3 °F (35.2 °C)-96.9 °F (36.1 °C)] 96.8 °F (36 °C)  Heart Rate:  [62-74] 62  Resp:  [16-20] 18  BP: ()/(59-80) 107/74  SpO2:  [95 %-100 %] 97 %  on  Flow (L/min):  [1-3] 1 O2 Device: nasal cannula with humidification    Intake/Output Summary (Last 24 hours) at 188  Last data filed at 18 1700   Gross per 24 hour   Intake              770 ml   Output            "  3075 ml   Net            -2305 ml     Flowsheet Rows         First Filed Value    Admission Height  170.2 cm (67\") Documented at 02/02/2018 2048    Admission Weight  74.8 kg (165 lb) Documented at 02/02/2018 2048        Body mass index is 24.87 kg/(m^2).  Last 3 weights    02/04/18  0557 02/05/18  0700 02/05/18  1109   Weight: 72.9 kg (160 lb 12.8 oz) 73.4 kg (161 lb 12.8 oz) 72 kg (158 lb 12.8 oz)       Physical Exam:  GEN:  No acute distress, alert, cooperative, well developed , On oxygen  EYES:   Sclera clear. No icterus. PERRL.   ENT:   External ears/nose normal, no oral lesions, no thrush, mucous membranes moist  NECK:  Supple, midline trachea  LUNGS: Normal chest on inspection, crackles BLL worse on the left, mild expiratory wheezes Respirations regular,non labored.   CV:  Irregular rhythm and normal rate. Normal S1/S2. No murmurs, gallops, or rubs noted.  ABD:  Soft, non-tender and non-distended. Normal bowel sounds. No guarding  EXT:  Moves all extremities well. No cyanosis. No redness. trace edema.   Skin: dry, intact, no bleeding    Results Review:        Results from last 7 days  Lab Units 02/04/18  0030 02/02/18 2107   SODIUM mmol/L  --  144   POTASSIUM mmol/L 4.6 4.8   CHLORIDE mmol/L  --  101   CO2 mmol/L  --  30.2*   BUN mg/dL  --  19   CREATININE mg/dL  --  1.16   CALCIUM mg/dL  --  9.5   AST (SGOT) U/L  --  57*   ALT (SGPT) U/L  --  33   ANION GAP mmol/L  --  12.8   ALBUMIN g/dL  --  3.90       Results from last 7 days  Lab Units 02/02/18 2107   TROPONIN T ng/mL 0.018           Results from last 7 days  Lab Units 02/02/18 2107   PROBNP pg/mL 6799.0*       Results from last 7 days  Lab Units 02/04/18  0818 02/02/18 2107   WBC 10*3/mm3 13.00* 14.48*   HEMOGLOBIN g/dL 12.6* 14.1   HEMATOCRIT % 37.8* 45.1   PLATELETS 10*3/mm3 188 207   MCV fL 97.7* 101.6*   NEUTROPHIL % % 76.7* 73.1   LYMPHOCYTE % % 13.2* 18.2*   MONOCYTES % % 9.6 7.0   EOSINOPHIL % % 0.2* 1.2   BASOPHIL % % 0.1 0.3   IMM GRAN % % " 0.2 0.2       Results from last 7 days  Lab Units 02/05/18  0539 02/04/18  0030 02/03/18  1455   INR  1.33* 2.08* 2.29*       Results from last 7 days  Lab Units 02/04/18  1234 02/04/18  0818 02/04/18  0030   MAGNESIUM mg/dL 2.5* 2.8* 1.5*                 No results found for: POCGLU    Results from last 7 days  Lab Units 02/04/18  0818 02/03/18  0211 02/02/18  2154 02/02/18  2107   PROCALCITONIN ng/mL 0.19  --   --  0.08*   LACTATE mmol/L  --  1.3 2.4*  --        Results from last 7 days  Lab Units 02/02/18  2206   BLOODCX    Staphylococcus, coagulase negative*  No growth at 2 days   GRAM STAIN RESULT  Anaerobic Bottle Gram positive cocci in clusters   BCIDPCR  Staphylococcus spp, not aureus. Identification by BCID PCR.*           Results from last 7 days  Lab Units 02/02/18 2208   ADENOVIRUS DETECTION BY PCR  Not Detected   CORONAVIRUS 229E  Not Detected   CORONAVIRUS HKU1  Not Detected   CORONAVIRUS NL63  Not Detected   CORONAVIRUS OC43  Not Detected   HUMAN METAPNEUMOVIRUS  Not Detected   HUMAN RHINOVIRUS/ENTEROVIRUS  Detected*   INFLUENZA B PCR  Not Detected   PARAINFLUENZA 1  Not Detected   PARAINFLUENZA VIRUS 2  Not Detected   PARAINFLUENZA VIRUS 3  Not Detected   PARAINFLUENZA VIRUS 4  Not Detected   BORDETELLA PERTUSSIS PCR  Not Detected   CHLAMYDOPHILA PNEUMONIAE PCR  Not Detected   MYCOPLAMA PNEUMO PCR  Not Detected   INFLUENZA A PCR  Not Detected   INFLUENZA A H3  Not Detected   INFLUENZA A H1  Not Detected   RSV, PCR  Not Detected           Imaging:   Imaging Results (all)     Procedure Component Value Units Date/Time    XR Chest 2 View [[id number]] Collected:  02/02/18 2126     Updated:  02/02/18 2126    Narrative:       CHEST PA AND LATERAL.     HISTORY: Shortness of breath.     COMPARISON: 08/28/2016.     FINDINGS:  Cardiomegaly, lung volumes are low. Severe pulmonary congestion has  developed, moderately large right pleural effusion.     Infiltrate at the right lung base cannot be excluded.                Impression:       Findings are suggestive of congestive heart failure, moderate right  pleural effusion. An infiltrate of the right lung base cannot be  excluded.                          I reviewed the patient's new clinical results.  I personally viewed and interpreted the patient's imaging results:Right pleural effusion with secondary atelectasis cannot rule out infection and infiltrate, positive cardiomegaly        Medication Review:     atorvastatin 40 mg Oral Daily   chlordiazePOXIDE 10 mg Oral Q8H   furosemide 40 mg Oral Daily   guaiFENesin 1,200 mg Oral Q12H   metoprolol succinate XL 50 mg Oral Q12H   pantoprazole 40 mg Oral QAM   potassium chloride 20 mEq Oral BID With Meals   traMADol 100 mg Oral BID            ASSESSMENT:   1. Diastolic congestive heart failure with exacerbation  2. Large right pleural effusion s/p thoracentesis on 2/5/18 with 2700 ml removed  3. Human rhinovirus infection  4. Mild pulmonary hypertension (mPAP = 27) from cath in 2016  5. Obstructive sleep apnea, mild, untreated,  6. Bronchospasm with wheezing,   7. Mild mitral and tricuspid valve regurgitation     PLAN:  Heavy alcohol consumption, at risk for DT, will continue librium and monitor with CIWA protocol  SOA significantly improved post thoracentesis.   Encouraged aggressive pulmonary hygiene measures with TCDB, mucinex and bronchodilators.   Add OPEP and IS   Continue bronchodilator, he still have some wheezes on exam.  Continue with the home diuretic dose at this point  Titrate oxygen to keep greater than 88% and wean off if tolerated. He does use nocturnal oxygen at home  CXR in am post thoracentesis    Discussed with the patient and family at bedside    Disposition: Home in am if still doing well.     Karina Grace MD  02/05/18  6:18 PM          EMR Dragon/Transcription:   Much of this encounter note is an electronic transcription/translation of spoken language to printed text. The electronic translation of spoken  language may permit erroneous, or at times, nonsensical words or phrases to be inadvertently transcribed; Although I have reviewed the note for such errors, some may still exist.

## 2018-02-22 NOTE — NURSING NOTE
Discharge instructions reviewed with patient and spouse and given a written copy. Understanding voiced. Taken to car per wheelchair by RN.

## 2018-02-23 ENCOUNTER — TELEPHONE (OUTPATIENT)
Dept: INTERVENTIONAL RADIOLOGY/VASCULAR | Facility: HOSPITAL | Age: 83
End: 2018-02-23

## 2018-02-26 LAB
CYTO UR: NORMAL
LAB AP CASE REPORT: NORMAL
Lab: NORMAL
PATH REPORT.FINAL DX SPEC: NORMAL
PATH REPORT.GROSS SPEC: NORMAL

## 2018-02-26 RX ORDER — METOPROLOL SUCCINATE 50 MG/1
TABLET, EXTENDED RELEASE ORAL
Qty: 60 TABLET | Refills: 2 | Status: SHIPPED | OUTPATIENT
Start: 2018-02-26 | End: 2018-04-13

## 2018-02-27 ENCOUNTER — APPOINTMENT (OUTPATIENT)
Dept: ONCOLOGY | Facility: CLINIC | Age: 83
End: 2018-02-27

## 2018-02-27 ENCOUNTER — APPOINTMENT (OUTPATIENT)
Dept: LAB | Facility: HOSPITAL | Age: 83
End: 2018-02-27

## 2018-03-20 ENCOUNTER — TRANSCRIBE ORDERS (OUTPATIENT)
Dept: ADMINISTRATIVE | Facility: HOSPITAL | Age: 83
End: 2018-03-20

## 2018-03-20 DIAGNOSIS — J90 RECURRENT PLEURAL EFFUSION ON RIGHT: Primary | ICD-10-CM

## 2018-03-21 RX ORDER — COLCHICINE 0.6 MG/1
0.6 CAPSULE ORAL EVERY 12 HOURS SCHEDULED
COMMUNITY
End: 2018-03-23 | Stop reason: SDUPTHER

## 2018-03-22 ENCOUNTER — TELEPHONE (OUTPATIENT)
Dept: CARDIOLOGY | Facility: CLINIC | Age: 83
End: 2018-03-22

## 2018-03-22 NOTE — TELEPHONE ENCOUNTER
Fabiola with [facility] Eddi kirk  Patient is having non specific CP.  He woke up with it.  It is dull and in the center of his chest.  Vital signs are stable 112/64, 60, 96.8, 18, 95% on 2L O2.  It is intermittent.  He sees Dr. Grace tomorrow for a thoracentesis. Patient doesn't feel it is serious.  Fabiola seems to think it is the fluid on his lungs.  He has a wet cough.  I told her to have him go to ER if worsens or Vital signs become abnormal.  He denies heaviness on chest.  There is fluid in lungs. She didn't know whether to send him to ER or not.    Corrie      Her number is [phone]        Spoke to Dr. Juarez.  She stated that if patient is having pain with only breaths or positional, that it is due to lungs.  If pain becomes constant or worsens, then he should go to the ER.  I called Robyn back and spoke with her.  She stated she would call him, but she did tell him to do the same.    Corrie

## 2018-03-22 NOTE — TELEPHONE ENCOUNTER
I informed patient's wife Elena.  She said actually the chest pain has gone away now.  Elena did say that Harry was given a new prescription yesterday from Dr. Grace and Harry thinks the chest pain could of been from that pill.  They are going to talk to Dr. Grace about it.  I advised them if the chest pain comes back or worsens that he does need to go to the ER.  Mike said she understands.  Nikole

## 2018-03-22 NOTE — TELEPHONE ENCOUNTER
Called patient at listed number and got was normal.  Left message to call back.  Also told him that if the pain was other than positional or pleuritic he should go to the emergency room. mariposa

## 2018-03-23 ENCOUNTER — HOSPITAL ENCOUNTER (OUTPATIENT)
Dept: ULTRASOUND IMAGING | Facility: HOSPITAL | Age: 83
Discharge: HOME | End: 2018-03-23
Attending: INTERNAL MEDICINE | Admitting: INTERNAL MEDICINE

## 2018-03-23 VITALS
HEART RATE: 65 BPM | HEIGHT: 67 IN | TEMPERATURE: 97 F | DIASTOLIC BLOOD PRESSURE: 62 MMHG | SYSTOLIC BLOOD PRESSURE: 125 MMHG | BODY MASS INDEX: 23.54 KG/M2 | OXYGEN SATURATION: 96 % | WEIGHT: 150 LBS | RESPIRATION RATE: 18 BRPM

## 2018-03-23 DIAGNOSIS — J90 RECURRENT PLEURAL EFFUSION ON RIGHT: ICD-10-CM

## 2018-03-23 LAB
INR PPP: 1.3 (ref 0.8–1.2)
PROTHROMBIN TIME: 15.2 SECONDS (ref 12.8–15.2)

## 2018-03-23 PROCEDURE — 76942 ECHO GUIDE FOR BIOPSY: CPT

## 2018-03-23 RX ORDER — COLCHICINE 0.6 MG/1
TABLET ORAL
COMMUNITY
Start: 2018-03-15 | End: 2018-04-13

## 2018-03-23 RX ORDER — LIDOCAINE HYDROCHLORIDE 10 MG/ML
20 INJECTION, SOLUTION INFILTRATION; PERINEURAL ONCE
Status: COMPLETED | OUTPATIENT
Start: 2018-03-23 | End: 2018-03-23

## 2018-03-23 RX ORDER — FLUTICASONE PROPIONATE 50 MCG
SPRAY, SUSPENSION (ML) NASAL
COMMUNITY
Start: 2018-03-14 | End: 2018-04-13

## 2018-03-23 RX ORDER — LIDOCAINE HYDROCHLORIDE 10 MG/ML
20 INJECTION, SOLUTION INFILTRATION; PERINEURAL ONCE
Status: DISCONTINUED | OUTPATIENT
Start: 2018-03-23 | End: 2018-03-23

## 2018-03-23 RX ADMIN — LIDOCAINE HYDROCHLORIDE 20 ML: 10 INJECTION, SOLUTION INFILTRATION; PERINEURAL at 13:16

## 2018-03-23 NOTE — NURSING NOTE
Returned from ultrasound. Dressing to mid right back dry and intact. No SOA. No complaints of chest pain. No distress noted. Lunch served.

## 2018-03-23 NOTE — DISCHARGE INSTRUCTIONS
EDUCATION /DISCHARGE INSTRUCTIONS Thoracentesis:  A thoracentesis is a procedure to remove fluid or air from the space between the lung and it's lining.  It is done to relieve lung compression and respiratory distress.  A sample can also be obtained to aid diagnosis.   Medications can be administered into the space.      During the procedure:  You will be seated comfortable leaning forward onto a pillow supported by a table.  The area will be cleaned with antiseptic soap and draped with a sterile towel.  The physician will administer a local antiseptic to numb the area.  Next, the physician will insert a needle with tubing attached into the space between the lung and lining.  Fluid is removed and a sample sent to the laboratory.   When completed a pressure dressing is applied to the site.  A chest x-ray is performed to ensure the lung is functioning properly.    Risks of the procedure include but are not limited to:   *  Bleeding    *  Low blood pressure *  Infection     *  Lung collapse possibly requiring insertion of a tube to re-inflate the lung.    Benefits of the procedure:  Relief of respiratory distress and facilitation of a diagnosis.    Alternatives to the procedure:  Drug therapy with diuretics to remove fluid.  Risks of diuretic drug therapy include possible dehydration and renal failure.  The benefit of drug therapy is that it can be done at home under physician supervision.  THIS EDUCATION INFORMATION WAS REVIEWED PRIOR TO THE PROCEDURE AND CONSENT. Patient initials___________________Time__________________    Post Procedure:    *  Rest today (no pushing pulling, straining or heavy lifting).   *  Slowly increase activity tomorow.    *  If you received sedation do not drive for 24 hours.   *  Keep dressing clean and dry.   *  Leave dressing on puncture site for 24 hours.    *  You may shower when dressing removed.   *  Expect some coughing as the lung re-expands.    Call your doctor if experiencing:   *   If experiencing sudden / severe shortness of breath, chest pain or if coughing       up blood go to the nearest emergency room.   *  Signs of infection such as redness, swelling, excessive pain and / or foul       smelling drainage from the puncture site.   *  Chills or fever over 101 degrees (by mouth).   *  Change in sputum color (yellow, green, red).   *  Unrelieved pain.   *  Any new or severe symptoms.  Following the procedure:     Follow-up with the ordering physician as directed.    Continue to take other medications as directed by your physician unless    otherwise instructed.   If applicable, resume taking your blood thinners or Aspirin and Coumadin on Saturday March 24, 2018    If you have any concerns please call the Radiology Nurses Desk at [phone].  You are the most important factor in your recovery.  Follow the above instructions carefully.

## 2018-04-05 LAB
MYCOBACTERIUM SPEC CULT: NORMAL
NIGHT BLUE STAIN TISS: NORMAL

## 2018-04-10 ENCOUNTER — PREP FOR SURGERY (OUTPATIENT)
Dept: OTHER | Facility: HOSPITAL | Age: 83
End: 2018-04-10

## 2018-04-10 ENCOUNTER — OFFICE VISIT (OUTPATIENT)
Dept: SURGERY | Facility: CLINIC | Age: 83
End: 2018-04-10

## 2018-04-10 VITALS
WEIGHT: 148 LBS | SYSTOLIC BLOOD PRESSURE: 104 MMHG | DIASTOLIC BLOOD PRESSURE: 62 MMHG | HEIGHT: 67 IN | BODY MASS INDEX: 23.23 KG/M2 | HEART RATE: 63 BPM | OXYGEN SATURATION: 94 %

## 2018-04-10 DIAGNOSIS — J90 PLEURAL EFFUSION: Primary | ICD-10-CM

## 2018-04-10 DIAGNOSIS — J90 PLEURAL EFFUSION, RIGHT: Primary | ICD-10-CM

## 2018-04-10 PROCEDURE — 99205 OFFICE O/P NEW HI 60 MIN: CPT | Performed by: THORACIC SURGERY (CARDIOTHORACIC VASCULAR SURGERY)

## 2018-04-10 RX ORDER — SODIUM CHLORIDE 0.9 % (FLUSH) 0.9 %
1-10 SYRINGE (ML) INJECTION AS NEEDED
Status: CANCELLED | OUTPATIENT
Start: 2018-04-20

## 2018-04-10 RX ORDER — CEFAZOLIN SODIUM 2 G/100ML
2 INJECTION, SOLUTION INTRAVENOUS ONCE
Status: CANCELLED | OUTPATIENT
Start: 2018-04-20 | End: 2018-04-20

## 2018-04-10 NOTE — PROGRESS NOTES
Subjective   Patient ID: Harry Ventura is a 87 y.o. male is being seen for consultation today at the request of Dr Grace    History of Present Illness  Dear Colleague,  Harry Ventura was seen in our office today for evaluation and treatment of a persistent right pleural effusion.  I have reviewed his history his x-rays and have examined.  Thank you for asking me to participate in the care of Mr. Ventura.    Mr. Leal is an 87-year-old  male he has a history of pulmonary hypertension and dyspnea on exertion.  He also has a history of acute on chronic diastolic congestive heart failure.  He also has a history of atrial fibrillation and is chronically anticoagulated with Coumadin.  He has developed a large right pleural effusion.  This effusion has been tapped 3 different times.  Each time more than 1500 cc of fluid had been aspirated.  He is here now for further evaluation and treatment of this effusion.    The patient has a history of heavy alcohol consumption drinking 3-4 alcoholic drinks per day. He has a history of sleep apnea and is currently on oxygen at night.  Recent echocardiogram shows moderate pulmonary hypertension with an RV systolic pressure of 47-53.  He has no cough or hemoptysis.  He has no hoarseness or change in his voice.  He denies pleuritic pain.  He is very sedentary.  As exerting himself he gets short of breath.  He notes that this shortness of breath is coming on easier now.    The following portions of the patient's history were reviewed and updated as appropriate: allergies, current medications, past family history, past medical history, past social history, past surgical history and problem list.  Review of Systems   Constitution: Negative.   HENT: Negative.    Eyes: Negative.    Cardiovascular: Negative.    Respiratory: Positive for shortness of breath.    Endocrine: Negative.    Hematologic/Lymphatic: Negative.    Skin: Negative.    Musculoskeletal: Negative.    Gastrointestinal:  Negative.    Genitourinary: Negative.    Neurological: Negative.    Psychiatric/Behavioral: Negative.      Patient Active Problem List   Diagnosis   • Atrial fibrillation   • CHF (congestive heart failure)   • Chronic anticoagulation   • GERD (gastroesophageal reflux disease)   • Pure hypercholesterolemia   • Essential hypertension   • Personal history of CLL (chronic lymphocytic leukemia)   • Personal history of venous thrombosis and embolism   • Proteinuria   • Closed compression fracture of L1 lumbar vertebral body   • Gait disturbance   • Chronic pain syndrome   • Chronic leukemia of unspecified cell type not having achieved remission   • Anemia of chronic renal failure, stage 3 (moderate)   • Thrombocytopenia   • Vitamin B12 deficiency   • Coronary artery disease involving native coronary artery of native heart without angina pectoris   • TIA (transient ischemic attack)   • Pacemaker   • Pulmonary hypertension   • Congestive heart failure   • Viral infection   • Pleural effusion, right   • Chronic obstructive pulmonary disease (COPD)     Past Medical History:   Diagnosis Date   • Accelerated essential hypertension    • Anemia     Mild   • Atrial fibrillation    • CAP (community acquired pneumonia)    • CHF (congestive heart failure)    • Chronic anticoagulation    • Chronic diastolic CHF (congestive heart failure)    • CLL (chronic lymphocytic leukemia)     Stage 0   • Coronary artery disease    • ED (erectile dysfunction) of non-organic origin    • Former smoker    • Gait instability    • GERD (gastroesophageal reflux disease)    • Glucose intolerance (malabsorption)    • H/O Thrombocytopenia     Mild   • Hematuria    • History of EKG 08/28/2016   • Hypercholesterolemia    • Hypertension     Pulmonary   • Hypogonadism male    • Insomnia    • Iron overload    • Low back pain    • Macrocytosis    • Personal history of CLL (chronic lymphocytic leukemia)    • Personal history of venous thrombosis and embolism    •  Proteinuria    • Renal insufficiency    • Seborrheic dermatitis      Past Surgical History:   Procedure Laterality Date   • CARDIAC CATHETERIZATION N/A 6/17/2016    Procedure: Right and Left Heart Cath;  Surgeon: Harry Bolton MD;  Location:  [facility];  Service:    • CARDIAC CATHETERIZATION N/A 6/17/2016    Procedure: Coronary angiography;  Surgeon: Harry Bolton MD;  Location:  [facility];  Service:    • CARDIAC CATHETERIZATION N/A 6/17/2016    Procedure: Left Heart Cath;  Surgeon: Harry Bolton MD;  Location:  [facility];  Service:    • CARDIAC CATHETERIZATION N/A 6/17/2016    Procedure: Left ventriculography;  Surgeon: Harry Bolton MD;  Location:  [facility];  Service:    • CHOLECYSTECTOMY  1983   • INSERT / REPLACE / REMOVE PACEMAKER     • UMBILICAL HERNIA REPAIR  2006    Performed laparoscopically by Dr. Garcia.     Family History   Problem Relation Age of Onset   • Emphysema Father    • Emphysema Sister    • Coronary artery disease Other    • No Known Problems Mother    • Liver cancer Paternal Uncle 55   • Liver cancer Paternal Uncle 74   • Liver cancer Paternal Uncle 79     Social History     Social History   • Marital status:      Spouse name: Elena   • Number of children: N/A   • Years of education: N/A     Occupational History   • Retired      Worked in sales for [employer].     Social History Main Topics   • Smoking status: Former Smoker   • Smokeless tobacco: Never Used      Comment: Quit smoking over 30 years ago. //  caffeine use   • Alcohol use Yes      Comment: Three drinks a day wine or vodka, reporting 28 drinks a week.    • Drug use: No   • Sexual activity: Yes     Partners: Female     Other Topics Concern   • Not on file     Social History Narrative    Stays fairly active for his age, walking and golfing.        Current Outpatient Prescriptions:   •  aspirin 81 MG EC tablet, Take 81 mg by mouth Daily., Disp: , Rfl:   •   atorvastatin (LIPITOR) 40 MG tablet, Take 40 mg by mouth Daily., Disp: , Rfl:   •  colchicine 0.6 MG tablet, , Disp: , Rfl:   •  fluticasone (FLONASE) 50 MCG/ACT nasal spray, , Disp: , Rfl:   •  furosemide (LASIX) 40 MG tablet, Take 40 mg by mouth Daily., Disp: , Rfl:   •  guaiFENesin (MUCINEX) 600 MG 12 hr tablet, Take 2 tablets by mouth Every 12 (Twelve) Hours., Disp: 30 tablet, Rfl: 0  •  ipratropium-albuterol (DUO-NEB) 0.5-2.5 mg/mL nebulizer, Take 3 mL by nebulization Every 6 (Six) Hours As Needed for Wheezing or Shortness of Air. Dx J44.9, Disp: 360 mL, Rfl: 2  •  metoprolol succinate XL (TOPROL-XL) 50 MG 24 hr tablet, TAKE 1 TABLET BY MOUTH TWO TIMES A DAY  (Patient taking differently: TAKE 1 TABLET BY MOUTH TWO TIMES A DAY ,  25mg in am and 50 mg nightly), Disp: 60 tablet, Rfl: 2  •  metoprolol tartrate (LOPRESSOR) 25 MG tablet, Take 25 mg by mouth Every Morning., Disp: , Rfl:   •  omeprazole (priLOSEC) 20 MG capsule, Take 20 mg by mouth Daily., Disp: , Rfl:   •  potassium chloride (K-DUR,KLOR-CON) 10 MEQ CR tablet, Take 1 tablet by mouth 2 (Two) Times a Day. (Patient taking differently: Take 10 mEq by mouth Daily.), Disp: 180 tablet, Rfl: 1  •  traMADol (ULTRAM) 50 MG tablet, Take 100 mg by mouth 2 (Two) Times a Day As Needed., Disp: , Rfl:   •  valACYclovir (VALTREX) 500 MG tablet, Take 500 mg by mouth 2 (Two) Times a Day As Needed (For fever blisters)., Disp: , Rfl:   •  warfarin (COUMADIN) 2.5 MG tablet, Take 2.5 mg by mouth See Admin Instructions. Daily or as directed by physician, Disp: , Rfl:   No Known Allergies     Objective   Vitals:    04/10/18 1451   BP: 104/62   Pulse: 63   SpO2: 94%     Physical Exam   Constitutional: He is oriented to person, place, and time. He appears well-developed and well-nourished. He appears cachectic.   HENT:   Head: Normocephalic.   Eyes: Conjunctivae, EOM and lids are normal. Pupils are equal, round, and reactive to light.   Neck: Trachea normal and normal range of  motion. Neck supple. No hepatojugular reflux and no JVD present. Carotid bruit is not present. No thyroid mass and no thyromegaly present.   Cardiovascular: Normal rate, regular rhythm, S1 normal, S2 normal, normal heart sounds and normal pulses.   No extrasystoles are present. PMI is not displaced.    Pulmonary/Chest: Effort normal. He has decreased breath sounds in the right middle field and the right lower field.   Abdominal: Soft. Normal appearance and bowel sounds are normal. He exhibits no mass. There is no hepatosplenomegaly. There is no tenderness. No hernia.   Musculoskeletal: Normal range of motion.   Neurological: He is alert and oriented to person, place, and time. He has normal strength and normal reflexes. No cranial nerve deficit or sensory deficit. He displays a negative Romberg sign.   Skin: Skin is warm, dry and intact.   Psychiatric: He has a normal mood and affect. His speech is normal and behavior is normal. Judgment and thought content normal. Cognition and memory are normal.     Independent Review of Radiographic Studies:    Chest x-ray performed on February 18, 2018 was independently reviewed.  There is cardiomegaly.  There is a moderate to large right pleural effusion.  Very small left pleural effusion.  See no lung masses.    Assessment/Plan      Assessment: It's most likely that this pleural effusion is related to cardiac dysfunction.  We cannot completely rule out malignancy.      Plan: I have discussed 3 options with the patient and his wife.  First we discussed right VAT with pleural biopsy and pleurodesis.  I explained the procedure as well as the risks and benefits.  Second we discussed serial thoracenteses as we are now doing.  The pros and cons of this approach were also discussed.  Third we discussed placement of a Pleurx catheter.  I described the procedure as well as the risks and benefits.  I have answered all of his questions to his satisfaction.  The patient has requested that  we proceed on with right VAT with pleural biopsy and pleurodesis.  Arrangements will be made to do this procedure [facility] in the near future.  I will have you notified when the patient is admitted.  Thank you for allowing me to participate in the care of Mr. Ventura.    Diagnoses and all orders for this visit:    Pleural effusion, right  -     Case request

## 2018-04-12 ENCOUNTER — EPISODE CHANGES (OUTPATIENT)
Dept: CASE MANAGEMENT | Facility: OTHER | Age: 83
End: 2018-04-12

## 2018-04-13 ENCOUNTER — APPOINTMENT (OUTPATIENT)
Dept: PREADMISSION TESTING | Facility: HOSPITAL | Age: 83
End: 2018-04-13

## 2018-04-13 VITALS
TEMPERATURE: 98 F | WEIGHT: 149 LBS | HEART RATE: 71 BPM | BODY MASS INDEX: 23.39 KG/M2 | SYSTOLIC BLOOD PRESSURE: 152 MMHG | DIASTOLIC BLOOD PRESSURE: 73 MMHG | OXYGEN SATURATION: 98 % | HEIGHT: 67 IN | RESPIRATION RATE: 18 BRPM

## 2018-04-13 DIAGNOSIS — J90 PLEURAL EFFUSION: ICD-10-CM

## 2018-04-13 LAB
ABO GROUP BLD: NORMAL
ANION GAP SERPL CALCULATED.3IONS-SCNC: 11.7 MMOL/L
BASOPHILS # BLD AUTO: 0.07 10*3/MM3 (ref 0–0.2)
BASOPHILS NFR BLD AUTO: 0.7 % (ref 0–1.5)
BLD GP AB SCN SERPL QL: NEGATIVE
BUN BLD-MCNC: 32 MG/DL (ref 8–23)
BUN/CREAT SERPL: 24.1 (ref 7–25)
CALCIUM SPEC-SCNC: 9.8 MG/DL (ref 8.6–10.5)
CHLORIDE SERPL-SCNC: 97 MMOL/L (ref 98–107)
CO2 SERPL-SCNC: 31.3 MMOL/L (ref 22–29)
CREAT BLD-MCNC: 1.33 MG/DL (ref 0.76–1.27)
DEPRECATED RDW RBC AUTO: 50.8 FL (ref 37–54)
EOSINOPHIL # BLD AUTO: 0.97 10*3/MM3 (ref 0–0.7)
EOSINOPHIL NFR BLD AUTO: 10 % (ref 0.3–6.2)
ERYTHROCYTE [DISTWIDTH] IN BLOOD BY AUTOMATED COUNT: 14.4 % (ref 11.5–14.5)
GFR SERPL CREATININE-BSD FRML MDRD: 51 ML/MIN/1.73
GLUCOSE BLD-MCNC: 107 MG/DL (ref 65–99)
HCT VFR BLD AUTO: 41 % (ref 40.4–52.2)
HGB BLD-MCNC: 13 G/DL (ref 13.7–17.6)
IMM GRANULOCYTES # BLD: 0.02 10*3/MM3 (ref 0–0.03)
IMM GRANULOCYTES NFR BLD: 0.2 % (ref 0–0.5)
INR PPP: 1.55 (ref 0.9–1.1)
LYMPHOCYTES # BLD AUTO: 1.81 10*3/MM3 (ref 0.9–4.8)
LYMPHOCYTES NFR BLD AUTO: 18.7 % (ref 19.6–45.3)
MCH RBC QN AUTO: 30.7 PG (ref 27–32.7)
MCHC RBC AUTO-ENTMCNC: 31.7 G/DL (ref 32.6–36.4)
MCV RBC AUTO: 96.9 FL (ref 79.8–96.2)
MONOCYTES # BLD AUTO: 0.77 10*3/MM3 (ref 0.2–1.2)
MONOCYTES NFR BLD AUTO: 8 % (ref 5–12)
NEUTROPHILS # BLD AUTO: 6.03 10*3/MM3 (ref 1.9–8.1)
NEUTROPHILS NFR BLD AUTO: 62.4 % (ref 42.7–76)
PLATELET # BLD AUTO: 196 10*3/MM3 (ref 140–500)
PMV BLD AUTO: 11.5 FL (ref 6–12)
POTASSIUM BLD-SCNC: 4.5 MMOL/L (ref 3.5–5.2)
PROTHROMBIN TIME: 18.3 SECONDS (ref 11.7–14.2)
RBC # BLD AUTO: 4.23 10*6/MM3 (ref 4.6–6)
RH BLD: POSITIVE
SODIUM BLD-SCNC: 140 MMOL/L (ref 136–145)
T&S EXPIRATION DATE: NORMAL
WBC NRBC COR # BLD: 9.67 10*3/MM3 (ref 4.5–10.7)

## 2018-04-13 PROCEDURE — 86850 RBC ANTIBODY SCREEN: CPT | Performed by: THORACIC SURGERY (CARDIOTHORACIC VASCULAR SURGERY)

## 2018-04-13 PROCEDURE — 85025 COMPLETE CBC W/AUTO DIFF WBC: CPT | Performed by: THORACIC SURGERY (CARDIOTHORACIC VASCULAR SURGERY)

## 2018-04-13 PROCEDURE — 85610 PROTHROMBIN TIME: CPT | Performed by: THORACIC SURGERY (CARDIOTHORACIC VASCULAR SURGERY)

## 2018-04-13 PROCEDURE — 86901 BLOOD TYPING SEROLOGIC RH(D): CPT | Performed by: THORACIC SURGERY (CARDIOTHORACIC VASCULAR SURGERY)

## 2018-04-13 PROCEDURE — 80048 BASIC METABOLIC PNL TOTAL CA: CPT | Performed by: THORACIC SURGERY (CARDIOTHORACIC VASCULAR SURGERY)

## 2018-04-13 PROCEDURE — 86900 BLOOD TYPING SEROLOGIC ABO: CPT | Performed by: THORACIC SURGERY (CARDIOTHORACIC VASCULAR SURGERY)

## 2018-04-13 PROCEDURE — 36415 COLL VENOUS BLD VENIPUNCTURE: CPT

## 2018-04-13 RX ORDER — METOPROLOL TARTRATE 50 MG/1
50 TABLET, FILM COATED ORAL NIGHTLY
COMMUNITY
End: 2018-10-30 | Stop reason: DRUGHIGH

## 2018-04-13 RX ORDER — METOPROLOL TARTRATE 50 MG/1
50 TABLET, FILM COATED ORAL EVERY MORNING
COMMUNITY
End: 2019-01-25 | Stop reason: SDUPTHER

## 2018-04-13 NOTE — DISCHARGE INSTRUCTIONS
Take the following medications the morning of surgery with a small sip of water: METOPROLOL AND OMEPRAZOLE    ARRIVE  AM    General Instructions:  • Do not eat solid food after midnight the night before surgery.  • You may drink clear liquids day of surgery but must stop at least one hour before your hospital arrival time.  • It is beneficial for you to have a clear drink that contains carbohydrates the day of surgery.  We suggest a 12 to 20 ounce bottle of Gatorade or Powerade for non-diabetic patients or a 12 to 20 ounce bottle of G2 or Powerade Zero for diabetic patients. (Pediatric patients, are not advised to drink a 12 to 20 ounce carbohydrate drink)    Clear liquids are liquids you can see through.  Nothing red in color.     Plain water                               Sports drinks  Sodas                                   Gelatin (Jell-O)  Fruit juices without pulp such as white grape juice and apple juice  Popsicles that contain no fruit or yogurt  Tea or coffee (no cream or milk added)  Gatorade / Powerade  G2 / Powerade Zero    • Infants may have breast milk up to four hours before surgery.  • Infants drinking formula may drink formula up to six hours before surgery.   • Patients who avoid smoking, chewing tobacco and alcohol for 4 weeks prior to surgery have a reduced risk of post-operative complications.  Quit smoking as many days before surgery as you can.  • Do not smoke, use chewing tobacco or drink alcohol the day of surgery.   • If applicable bring your C-PAP/ BI-PAP machine.  • Bring any papers given to you in the doctor’s office.  • Wear clean comfortable clothes and socks.  • Do not wear contact lenses or make-up.  Bring a case for your glasses.   • Bring crutches or walker if applicable.  • Remove all piercings.  Leave jewelry and any other valuables at home.  • Hair extensions with metal clips must be removed prior to surgery.  • The Pre-Admission Testing nurse will instruct you to bring  medications if unable to obtain an accurate list in Pre-Admission Testing.        Preventing a Surgical Site Infection:  • For 2 to 3 days before surgery, avoid shaving with a razor because the razor can irritate skin and make it easier to develop an infection.  • The night prior to surgery sleep in a clean bed with clean clothing.  Do not allow pets to sleep with you.  • Shower on the morning of surgery using a fresh bar of anti-bacterial soap (such as Dial) and clean washcloth.  Dry with a clean towel and dress in clean clothing.  • Ask your surgeon if you will be receiving antibiotics prior to surgery.  • Make sure you, your family, and all healthcare providers clean their hands with soap and water or an alcohol based hand  before caring for you or your wound.    Day of surgery:  Upon arrival, a Pre-op nurse and Anesthesiologist will review your health history, obtain vital signs, and answer questions you may have.  The only belongings needed at this time will be your home medications and if applicable your C-PAP/BI-PAP machine.  If you are staying overnight your family can leave the rest of your belongings in the car and bring them to your room later.  A Pre-op nurse will start an IV and you may receive medication in preparation for surgery, including something to help you relax.  Your family will be able to see you in the Pre-op area.  While you are in surgery your family should notify the waiting room  if they leave the waiting room area and provide a contact phone number.    Please be aware that surgery does come with discomfort.  We want to make every effort to control your discomfort so please discuss any uncontrolled symptoms with your nurse.   Your doctor will most likely have prescribed pain medications.      If you are going home after surgery you will receive individualized written care instructions before being discharged.  A responsible adult must drive you to and from the  hospital on the day of your surgery and stay with you for 24 hours.    If you are staying overnight following surgery, you will be transported to your hospital room following the recovery period.  [facility] has all private rooms.    If you have any questions please call Pre-Admission Testing at [phone].  Deductibles and co-payments are collected on the day of service. Please be prepared to pay the required co-pay, deductible or deposit on the day of service as defined by your plan.

## 2018-04-16 ENCOUNTER — TELEPHONE (OUTPATIENT)
Dept: CARDIOLOGY | Facility: CLINIC | Age: 83
End: 2018-04-16

## 2018-04-20 ENCOUNTER — HOSPITAL ENCOUNTER (INPATIENT)
Facility: HOSPITAL | Age: 83
LOS: 13 days | Discharge: NURSING, REHAB OR LONG-TERM CARE | End: 2018-05-03
Attending: THORACIC SURGERY (CARDIOTHORACIC VASCULAR SURGERY) | Admitting: THORACIC SURGERY (CARDIOTHORACIC VASCULAR SURGERY)

## 2018-04-20 ENCOUNTER — APPOINTMENT (OUTPATIENT)
Dept: GENERAL RADIOLOGY | Facility: HOSPITAL | Age: 83
End: 2018-04-20

## 2018-04-20 ENCOUNTER — ANESTHESIA EVENT (OUTPATIENT)
Dept: PERIOP | Facility: HOSPITAL | Age: 83
End: 2018-04-20

## 2018-04-20 ENCOUNTER — ANESTHESIA (OUTPATIENT)
Dept: PERIOP | Facility: HOSPITAL | Age: 83
End: 2018-04-20

## 2018-04-20 DIAGNOSIS — J90 PLEURAL EFFUSION, RIGHT: ICD-10-CM

## 2018-04-20 DIAGNOSIS — R53.1 WEAKNESS GENERALIZED: Primary | ICD-10-CM

## 2018-04-20 DIAGNOSIS — J90 PLEURAL EFFUSION: ICD-10-CM

## 2018-04-20 PROBLEM — R41.0 CONFUSION, POSTOPERATIVE: Status: ACTIVE | Noted: 2018-04-20

## 2018-04-20 LAB
GIE STN SPEC: NORMAL
GLUCOSE BLDC GLUCOMTR-MCNC: 107 MG/DL (ref 70–130)
INR PPP: 1.1 (ref 0.9–1.1)
PROTHROMBIN TIME: 14 SECONDS (ref 11.7–14.2)

## 2018-04-20 PROCEDURE — 0BNK4ZZ RELEASE RIGHT LUNG, PERCUTANEOUS ENDOSCOPIC APPROACH: ICD-10-PCS | Performed by: THORACIC SURGERY (CARDIOTHORACIC VASCULAR SURGERY)

## 2018-04-20 PROCEDURE — 87116 MYCOBACTERIA CULTURE: CPT | Performed by: THORACIC SURGERY (CARDIOTHORACIC VASCULAR SURGERY)

## 2018-04-20 PROCEDURE — 25010000002 DEXAMETHASONE PER 1 MG: Performed by: NURSE ANESTHETIST, CERTIFIED REGISTERED

## 2018-04-20 PROCEDURE — 87205 SMEAR GRAM STAIN: CPT | Performed by: THORACIC SURGERY (CARDIOTHORACIC VASCULAR SURGERY)

## 2018-04-20 PROCEDURE — 99222 1ST HOSP IP/OBS MODERATE 55: CPT | Performed by: INTERNAL MEDICINE

## 2018-04-20 PROCEDURE — 87206 SMEAR FLUORESCENT/ACID STAI: CPT | Performed by: THORACIC SURGERY (CARDIOTHORACIC VASCULAR SURGERY)

## 2018-04-20 PROCEDURE — 25010000002 ONDANSETRON PER 1 MG: Performed by: NURSE ANESTHETIST, CERTIFIED REGISTERED

## 2018-04-20 PROCEDURE — 25010000002 MORPHINE PER 10 MG: Performed by: THORACIC SURGERY (CARDIOTHORACIC VASCULAR SURGERY)

## 2018-04-20 PROCEDURE — 25010000002 FENTANYL CITRATE (PF) 100 MCG/2ML SOLUTION: Performed by: ANESTHESIOLOGY

## 2018-04-20 PROCEDURE — 32220 RELEASE OF LUNG: CPT | Performed by: THORACIC SURGERY (CARDIOTHORACIC VASCULAR SURGERY)

## 2018-04-20 PROCEDURE — 88305 TISSUE EXAM BY PATHOLOGIST: CPT | Performed by: THORACIC SURGERY (CARDIOTHORACIC VASCULAR SURGERY)

## 2018-04-20 PROCEDURE — 0BBN4ZX EXCISION OF RIGHT PLEURA, PERCUTANEOUS ENDOSCOPIC APPROACH, DIAGNOSTIC: ICD-10-PCS | Performed by: THORACIC SURGERY (CARDIOTHORACIC VASCULAR SURGERY)

## 2018-04-20 PROCEDURE — 25010000002 NEOSTIGMINE PER 0.5 MG: Performed by: NURSE ANESTHETIST, CERTIFIED REGISTERED

## 2018-04-20 PROCEDURE — C1729 CATH, DRAINAGE: HCPCS | Performed by: THORACIC SURGERY (CARDIOTHORACIC VASCULAR SURGERY)

## 2018-04-20 PROCEDURE — 25010000003 CEFAZOLIN IN DEXTROSE 2-4 GM/100ML-% SOLUTION: Performed by: THORACIC SURGERY (CARDIOTHORACIC VASCULAR SURGERY)

## 2018-04-20 PROCEDURE — 87070 CULTURE OTHR SPECIMN AEROBIC: CPT | Performed by: THORACIC SURGERY (CARDIOTHORACIC VASCULAR SURGERY)

## 2018-04-20 PROCEDURE — 25010000002 PROPOFOL 10 MG/ML EMULSION: Performed by: NURSE ANESTHETIST, CERTIFIED REGISTERED

## 2018-04-20 PROCEDURE — C2618 PROBE/NEEDLE, CRYO: HCPCS | Performed by: THORACIC SURGERY (CARDIOTHORACIC VASCULAR SURGERY)

## 2018-04-20 PROCEDURE — 94799 UNLISTED PULMONARY SVC/PX: CPT

## 2018-04-20 PROCEDURE — 82962 GLUCOSE BLOOD TEST: CPT

## 2018-04-20 PROCEDURE — 87176 TISSUE HOMOGENIZATION CULTR: CPT | Performed by: THORACIC SURGERY (CARDIOTHORACIC VASCULAR SURGERY)

## 2018-04-20 PROCEDURE — 32609 THORACOSCOPY W/BX PLEURA: CPT | Performed by: THORACIC SURGERY (CARDIOTHORACIC VASCULAR SURGERY)

## 2018-04-20 PROCEDURE — 25010000002 LORAZEPAM PER 2 MG: Performed by: INTERNAL MEDICINE

## 2018-04-20 PROCEDURE — 85610 PROTHROMBIN TIME: CPT | Performed by: THORACIC SURGERY (CARDIOTHORACIC VASCULAR SURGERY)

## 2018-04-20 PROCEDURE — 25010000002 MIDAZOLAM PER 1 MG: Performed by: ANESTHESIOLOGY

## 2018-04-20 PROCEDURE — 3E0L4GC INTRODUCTION OF OTHER THERAPEUTIC SUBSTANCE INTO PLEURAL CAVITY, PERCUTANEOUS ENDOSCOPIC APPROACH: ICD-10-PCS | Performed by: THORACIC SURGERY (CARDIOTHORACIC VASCULAR SURGERY)

## 2018-04-20 PROCEDURE — 93010 ELECTROCARDIOGRAM REPORT: CPT | Performed by: INTERNAL MEDICINE

## 2018-04-20 PROCEDURE — 88312 SPECIAL STAINS GROUP 1: CPT | Performed by: THORACIC SURGERY (CARDIOTHORACIC VASCULAR SURGERY)

## 2018-04-20 PROCEDURE — 93005 ELECTROCARDIOGRAM TRACING: CPT | Performed by: INTERNAL MEDICINE

## 2018-04-20 PROCEDURE — 25010000002 FENTANYL CITRATE (PF) 100 MCG/2ML SOLUTION: Performed by: NURSE ANESTHETIST, CERTIFIED REGISTERED

## 2018-04-20 PROCEDURE — 0BJ08ZZ INSPECTION OF TRACHEOBRONCHIAL TREE, VIA NATURAL OR ARTIFICIAL OPENING ENDOSCOPIC: ICD-10-PCS | Performed by: THORACIC SURGERY (CARDIOTHORACIC VASCULAR SURGERY)

## 2018-04-20 PROCEDURE — 71045 X-RAY EXAM CHEST 1 VIEW: CPT

## 2018-04-20 PROCEDURE — 25010000002 PHENYLEPHRINE PER 1 ML: Performed by: NURSE ANESTHETIST, CERTIFIED REGISTERED

## 2018-04-20 PROCEDURE — S0260 H&P FOR SURGERY: HCPCS | Performed by: THORACIC SURGERY (CARDIOTHORACIC VASCULAR SURGERY)

## 2018-04-20 PROCEDURE — 25010000002 HYDROMORPHONE PER 4 MG: Performed by: NURSE ANESTHETIST, CERTIFIED REGISTERED

## 2018-04-20 PROCEDURE — 87102 FUNGUS ISOLATION CULTURE: CPT | Performed by: THORACIC SURGERY (CARDIOTHORACIC VASCULAR SURGERY)

## 2018-04-20 PROCEDURE — 88331 PATH CONSLTJ SURG 1 BLK 1SPC: CPT | Performed by: THORACIC SURGERY (CARDIOTHORACIC VASCULAR SURGERY)

## 2018-04-20 RX ORDER — SENNA AND DOCUSATE SODIUM 50; 8.6 MG/1; MG/1
2 TABLET, FILM COATED ORAL NIGHTLY
Status: DISCONTINUED | OUTPATIENT
Start: 2018-04-20 | End: 2018-05-03 | Stop reason: HOSPADM

## 2018-04-20 RX ORDER — SODIUM CHLORIDE 0.9 % (FLUSH) 0.9 %
1-10 SYRINGE (ML) INJECTION AS NEEDED
Status: DISCONTINUED | OUTPATIENT
Start: 2018-04-20 | End: 2018-04-20 | Stop reason: HOSPADM

## 2018-04-20 RX ORDER — METOPROLOL TARTRATE 50 MG/1
50 TABLET, FILM COATED ORAL NIGHTLY
Status: DISCONTINUED | OUTPATIENT
Start: 2018-04-20 | End: 2018-04-22

## 2018-04-20 RX ORDER — MIDAZOLAM HYDROCHLORIDE 1 MG/ML
1 INJECTION INTRAMUSCULAR; INTRAVENOUS
Status: DISCONTINUED | OUTPATIENT
Start: 2018-04-20 | End: 2018-04-20 | Stop reason: HOSPADM

## 2018-04-20 RX ORDER — LORAZEPAM 2 MG/ML
2 INJECTION INTRAMUSCULAR ONCE
Status: COMPLETED | OUTPATIENT
Start: 2018-04-20 | End: 2018-04-20

## 2018-04-20 RX ORDER — ONDANSETRON 2 MG/ML
4 INJECTION INTRAMUSCULAR; INTRAVENOUS EVERY 6 HOURS PRN
Status: DISCONTINUED | OUTPATIENT
Start: 2018-04-20 | End: 2018-05-03 | Stop reason: HOSPADM

## 2018-04-20 RX ORDER — HYDRALAZINE HYDROCHLORIDE 20 MG/ML
5 INJECTION INTRAMUSCULAR; INTRAVENOUS
Status: DISCONTINUED | OUTPATIENT
Start: 2018-04-20 | End: 2018-04-20 | Stop reason: HOSPADM

## 2018-04-20 RX ORDER — VALACYCLOVIR HYDROCHLORIDE 500 MG/1
500 TABLET, FILM COATED ORAL 2 TIMES DAILY PRN
Status: ACTIVE | OUTPATIENT
Start: 2018-04-20 | End: 2018-04-25

## 2018-04-20 RX ORDER — ASPIRIN 81 MG/1
81 TABLET ORAL DAILY
Status: DISCONTINUED | OUTPATIENT
Start: 2018-04-20 | End: 2018-05-03 | Stop reason: HOSPADM

## 2018-04-20 RX ORDER — PROMETHAZINE HYDROCHLORIDE 25 MG/ML
12.5 INJECTION, SOLUTION INTRAMUSCULAR; INTRAVENOUS ONCE AS NEEDED
Status: DISCONTINUED | OUTPATIENT
Start: 2018-04-20 | End: 2018-04-20 | Stop reason: HOSPADM

## 2018-04-20 RX ORDER — ONDANSETRON 4 MG/1
4 TABLET, ORALLY DISINTEGRATING ORAL EVERY 6 HOURS PRN
Status: DISCONTINUED | OUTPATIENT
Start: 2018-04-20 | End: 2018-05-03 | Stop reason: HOSPADM

## 2018-04-20 RX ORDER — FENTANYL CITRATE 50 UG/ML
INJECTION, SOLUTION INTRAMUSCULAR; INTRAVENOUS AS NEEDED
Status: DISCONTINUED | OUTPATIENT
Start: 2018-04-20 | End: 2018-04-20 | Stop reason: SURG

## 2018-04-20 RX ORDER — SODIUM CHLORIDE 9 MG/ML
75 INJECTION, SOLUTION INTRAVENOUS CONTINUOUS
Status: DISCONTINUED | OUTPATIENT
Start: 2018-04-20 | End: 2018-04-21

## 2018-04-20 RX ORDER — IPRATROPIUM BROMIDE AND ALBUTEROL SULFATE 2.5; .5 MG/3ML; MG/3ML
3 SOLUTION RESPIRATORY (INHALATION)
Status: DISPENSED | OUTPATIENT
Start: 2018-04-20 | End: 2018-04-22

## 2018-04-20 RX ORDER — DIPHENHYDRAMINE HYDROCHLORIDE 50 MG/ML
12.5 INJECTION INTRAMUSCULAR; INTRAVENOUS
Status: DISCONTINUED | OUTPATIENT
Start: 2018-04-20 | End: 2018-04-20 | Stop reason: HOSPADM

## 2018-04-20 RX ORDER — EPHEDRINE SULFATE 50 MG/ML
INJECTION, SOLUTION INTRAVENOUS AS NEEDED
Status: DISCONTINUED | OUTPATIENT
Start: 2018-04-20 | End: 2018-04-20 | Stop reason: SURG

## 2018-04-20 RX ORDER — FAMOTIDINE 10 MG/ML
20 INJECTION, SOLUTION INTRAVENOUS ONCE
Status: COMPLETED | OUTPATIENT
Start: 2018-04-20 | End: 2018-04-20

## 2018-04-20 RX ORDER — HYDROCODONE BITARTRATE AND ACETAMINOPHEN 7.5; 325 MG/1; MG/1
1 TABLET ORAL ONCE AS NEEDED
Status: DISCONTINUED | OUTPATIENT
Start: 2018-04-20 | End: 2018-04-20 | Stop reason: HOSPADM

## 2018-04-20 RX ORDER — POTASSIUM CHLORIDE 750 MG/1
10 CAPSULE, EXTENDED RELEASE ORAL DAILY
Status: DISCONTINUED | OUTPATIENT
Start: 2018-04-20 | End: 2018-04-21

## 2018-04-20 RX ORDER — ONDANSETRON 2 MG/ML
INJECTION INTRAMUSCULAR; INTRAVENOUS AS NEEDED
Status: DISCONTINUED | OUTPATIENT
Start: 2018-04-20 | End: 2018-04-20 | Stop reason: SURG

## 2018-04-20 RX ORDER — FUROSEMIDE 40 MG/1
40 TABLET ORAL DAILY
Status: DISCONTINUED | OUTPATIENT
Start: 2018-04-20 | End: 2018-04-22

## 2018-04-20 RX ORDER — PROMETHAZINE HYDROCHLORIDE 25 MG/1
25 SUPPOSITORY RECTAL ONCE AS NEEDED
Status: DISCONTINUED | OUTPATIENT
Start: 2018-04-20 | End: 2018-04-20 | Stop reason: HOSPADM

## 2018-04-20 RX ORDER — GLYCOPYRROLATE 0.2 MG/ML
INJECTION INTRAMUSCULAR; INTRAVENOUS AS NEEDED
Status: DISCONTINUED | OUTPATIENT
Start: 2018-04-20 | End: 2018-04-20 | Stop reason: SURG

## 2018-04-20 RX ORDER — ROCURONIUM BROMIDE 10 MG/ML
INJECTION, SOLUTION INTRAVENOUS AS NEEDED
Status: DISCONTINUED | OUTPATIENT
Start: 2018-04-20 | End: 2018-04-20 | Stop reason: SURG

## 2018-04-20 RX ORDER — ACETAMINOPHEN 325 MG/1
650 TABLET ORAL EVERY 4 HOURS PRN
Status: DISCONTINUED | OUTPATIENT
Start: 2018-04-20 | End: 2018-05-03 | Stop reason: HOSPADM

## 2018-04-20 RX ORDER — DEXMEDETOMIDINE HYDROCHLORIDE 4 UG/ML
.2-1.5 INJECTION, SOLUTION INTRAVENOUS
Status: DISCONTINUED | OUTPATIENT
Start: 2018-04-20 | End: 2018-04-28

## 2018-04-20 RX ORDER — DOCUSATE SODIUM 100 MG/1
100 CAPSULE, LIQUID FILLED ORAL 2 TIMES DAILY
Status: DISCONTINUED | OUTPATIENT
Start: 2018-04-20 | End: 2018-05-03 | Stop reason: HOSPADM

## 2018-04-20 RX ORDER — LIDOCAINE HYDROCHLORIDE 20 MG/ML
INJECTION, SOLUTION INFILTRATION; PERINEURAL AS NEEDED
Status: DISCONTINUED | OUTPATIENT
Start: 2018-04-20 | End: 2018-04-20 | Stop reason: SURG

## 2018-04-20 RX ORDER — BISACODYL 10 MG
10 SUPPOSITORY, RECTAL RECTAL DAILY PRN
Status: DISCONTINUED | OUTPATIENT
Start: 2018-04-20 | End: 2018-05-03 | Stop reason: HOSPADM

## 2018-04-20 RX ORDER — PROMETHAZINE HYDROCHLORIDE 25 MG/1
25 TABLET ORAL ONCE AS NEEDED
Status: DISCONTINUED | OUTPATIENT
Start: 2018-04-20 | End: 2018-04-20 | Stop reason: HOSPADM

## 2018-04-20 RX ORDER — HYDROMORPHONE HCL 110MG/55ML
0.5 PATIENT CONTROLLED ANALGESIA SYRINGE INTRAVENOUS
Status: DISCONTINUED | OUTPATIENT
Start: 2018-04-20 | End: 2018-04-20 | Stop reason: HOSPADM

## 2018-04-20 RX ORDER — PROMETHAZINE HYDROCHLORIDE 25 MG/1
12.5 TABLET ORAL ONCE AS NEEDED
Status: DISCONTINUED | OUTPATIENT
Start: 2018-04-20 | End: 2018-04-20 | Stop reason: HOSPADM

## 2018-04-20 RX ORDER — SODIUM CHLORIDE 9 MG/ML
999 INJECTION, SOLUTION INTRAVENOUS ONCE
Status: COMPLETED | OUTPATIENT
Start: 2018-04-20 | End: 2018-04-20

## 2018-04-20 RX ORDER — HEPARIN SODIUM 5000 [USP'U]/ML
5000 INJECTION, SOLUTION INTRAVENOUS; SUBCUTANEOUS EVERY 8 HOURS SCHEDULED
Status: DISCONTINUED | OUTPATIENT
Start: 2018-04-21 | End: 2018-05-03 | Stop reason: HOSPADM

## 2018-04-20 RX ORDER — MIDAZOLAM HYDROCHLORIDE 1 MG/ML
2 INJECTION INTRAMUSCULAR; INTRAVENOUS
Status: DISCONTINUED | OUTPATIENT
Start: 2018-04-20 | End: 2018-04-20 | Stop reason: HOSPADM

## 2018-04-20 RX ORDER — ATORVASTATIN CALCIUM 20 MG/1
40 TABLET, FILM COATED ORAL DAILY
Status: DISCONTINUED | OUTPATIENT
Start: 2018-04-20 | End: 2018-05-03 | Stop reason: HOSPADM

## 2018-04-20 RX ORDER — HYDROCODONE BITARTRATE AND ACETAMINOPHEN 7.5; 325 MG/1; MG/1
2 TABLET ORAL EVERY 4 HOURS PRN
Status: DISCONTINUED | OUTPATIENT
Start: 2018-04-20 | End: 2018-04-30

## 2018-04-20 RX ORDER — NITROGLYCERIN 0.4 MG/1
0.4 TABLET SUBLINGUAL
Status: DISCONTINUED | OUTPATIENT
Start: 2018-04-20 | End: 2018-05-03 | Stop reason: HOSPADM

## 2018-04-20 RX ORDER — PROPOFOL 10 MG/ML
VIAL (ML) INTRAVENOUS AS NEEDED
Status: DISCONTINUED | OUTPATIENT
Start: 2018-04-20 | End: 2018-04-20 | Stop reason: SURG

## 2018-04-20 RX ORDER — ONDANSETRON 4 MG/1
4 TABLET, FILM COATED ORAL EVERY 6 HOURS PRN
Status: DISCONTINUED | OUTPATIENT
Start: 2018-04-20 | End: 2018-05-03 | Stop reason: HOSPADM

## 2018-04-20 RX ORDER — DEXAMETHASONE SODIUM PHOSPHATE 10 MG/ML
INJECTION INTRAMUSCULAR; INTRAVENOUS AS NEEDED
Status: DISCONTINUED | OUTPATIENT
Start: 2018-04-20 | End: 2018-04-20 | Stop reason: SURG

## 2018-04-20 RX ORDER — LIDOCAINE HYDROCHLORIDE 10 MG/ML
0.5 INJECTION, SOLUTION EPIDURAL; INFILTRATION; INTRACAUDAL; PERINEURAL ONCE AS NEEDED
Status: DISCONTINUED | OUTPATIENT
Start: 2018-04-20 | End: 2018-04-20 | Stop reason: HOSPADM

## 2018-04-20 RX ORDER — FENTANYL CITRATE 50 UG/ML
50 INJECTION, SOLUTION INTRAMUSCULAR; INTRAVENOUS
Status: DISCONTINUED | OUTPATIENT
Start: 2018-04-20 | End: 2018-04-20 | Stop reason: HOSPADM

## 2018-04-20 RX ORDER — CEFAZOLIN SODIUM 2 G/100ML
2 INJECTION, SOLUTION INTRAVENOUS ONCE
Status: COMPLETED | OUTPATIENT
Start: 2018-04-20 | End: 2018-04-20

## 2018-04-20 RX ORDER — NALOXONE HCL 0.4 MG/ML
0.2 VIAL (ML) INJECTION AS NEEDED
Status: DISCONTINUED | OUTPATIENT
Start: 2018-04-20 | End: 2018-04-20 | Stop reason: HOSPADM

## 2018-04-20 RX ORDER — SODIUM CHLORIDE 0.9 % (FLUSH) 0.9 %
1-10 SYRINGE (ML) INJECTION AS NEEDED
Status: DISCONTINUED | OUTPATIENT
Start: 2018-04-20 | End: 2018-05-03 | Stop reason: HOSPADM

## 2018-04-20 RX ORDER — SODIUM CHLORIDE, SODIUM LACTATE, POTASSIUM CHLORIDE, CALCIUM CHLORIDE 600; 310; 30; 20 MG/100ML; MG/100ML; MG/100ML; MG/100ML
9 INJECTION, SOLUTION INTRAVENOUS CONTINUOUS
Status: DISCONTINUED | OUTPATIENT
Start: 2018-04-20 | End: 2018-04-21

## 2018-04-20 RX ORDER — NALOXONE HCL 0.4 MG/ML
0.4 VIAL (ML) INJECTION
Status: DISCONTINUED | OUTPATIENT
Start: 2018-04-20 | End: 2018-04-30

## 2018-04-20 RX ORDER — MORPHINE SULFATE 2 MG/ML
2 INJECTION, SOLUTION INTRAMUSCULAR; INTRAVENOUS EVERY 4 HOURS PRN
Status: DISCONTINUED | OUTPATIENT
Start: 2018-04-20 | End: 2018-04-30

## 2018-04-20 RX ORDER — FLUMAZENIL 0.1 MG/ML
0.2 INJECTION INTRAVENOUS AS NEEDED
Status: DISCONTINUED | OUTPATIENT
Start: 2018-04-20 | End: 2018-04-20 | Stop reason: HOSPADM

## 2018-04-20 RX ORDER — MAGNESIUM HYDROXIDE 1200 MG/15ML
LIQUID ORAL AS NEEDED
Status: DISCONTINUED | OUTPATIENT
Start: 2018-04-20 | End: 2018-04-20 | Stop reason: HOSPADM

## 2018-04-20 RX ORDER — HYDROCODONE BITARTRATE AND ACETAMINOPHEN 7.5; 325 MG/1; MG/1
1 TABLET ORAL EVERY 4 HOURS PRN
Status: DISCONTINUED | OUTPATIENT
Start: 2018-04-20 | End: 2018-04-30

## 2018-04-20 RX ORDER — ONDANSETRON 2 MG/ML
4 INJECTION INTRAMUSCULAR; INTRAVENOUS ONCE AS NEEDED
Status: DISCONTINUED | OUTPATIENT
Start: 2018-04-20 | End: 2018-04-20 | Stop reason: HOSPADM

## 2018-04-20 RX ORDER — LABETALOL HYDROCHLORIDE 5 MG/ML
5 INJECTION, SOLUTION INTRAVENOUS
Status: DISCONTINUED | OUTPATIENT
Start: 2018-04-20 | End: 2018-04-20 | Stop reason: HOSPADM

## 2018-04-20 RX ORDER — CEFAZOLIN SODIUM 2 G/100ML
2 INJECTION, SOLUTION INTRAVENOUS EVERY 8 HOURS
Status: COMPLETED | OUTPATIENT
Start: 2018-04-20 | End: 2018-04-21

## 2018-04-20 RX ORDER — OXYCODONE AND ACETAMINOPHEN 7.5; 325 MG/1; MG/1
1 TABLET ORAL ONCE AS NEEDED
Status: DISCONTINUED | OUTPATIENT
Start: 2018-04-20 | End: 2018-04-20 | Stop reason: HOSPADM

## 2018-04-20 RX ORDER — EPHEDRINE SULFATE 50 MG/ML
5 INJECTION, SOLUTION INTRAVENOUS ONCE AS NEEDED
Status: DISCONTINUED | OUTPATIENT
Start: 2018-04-20 | End: 2018-04-20 | Stop reason: HOSPADM

## 2018-04-20 RX ADMIN — PHENYLEPHRINE HYDROCHLORIDE 100 MCG: 10 INJECTION INTRAVENOUS at 08:15

## 2018-04-20 RX ADMIN — FENTANYL CITRATE 50 MCG: 50 INJECTION, SOLUTION INTRAMUSCULAR; INTRAVENOUS at 06:50

## 2018-04-20 RX ADMIN — PHENYLEPHRINE HYDROCHLORIDE 100 MCG: 10 INJECTION INTRAVENOUS at 08:05

## 2018-04-20 RX ADMIN — PHENYLEPHRINE HYDROCHLORIDE 100 MCG: 10 INJECTION INTRAVENOUS at 09:00

## 2018-04-20 RX ADMIN — SODIUM CHLORIDE 75 ML/HR: 9 INJECTION, SOLUTION INTRAVENOUS at 14:45

## 2018-04-20 RX ADMIN — HYDROMORPHONE HYDROCHLORIDE 0.5 MG: 2 INJECTION INTRAMUSCULAR; INTRAVENOUS; SUBCUTANEOUS at 13:03

## 2018-04-20 RX ADMIN — DOCUSATE SODIUM 100 MG: 100 CAPSULE, LIQUID FILLED ORAL at 22:50

## 2018-04-20 RX ADMIN — ONDANSETRON 4 MG: 2 INJECTION INTRAMUSCULAR; INTRAVENOUS at 09:50

## 2018-04-20 RX ADMIN — PROPOFOL 120 MG: 10 INJECTION, EMULSION INTRAVENOUS at 07:54

## 2018-04-20 RX ADMIN — SODIUM CHLORIDE 75 ML/HR: 9 INJECTION, SOLUTION INTRAVENOUS at 23:02

## 2018-04-20 RX ADMIN — FAMOTIDINE 20 MG: 10 INJECTION INTRAVENOUS at 06:50

## 2018-04-20 RX ADMIN — DEXMEDETOMIDINE HYDROCHLORIDE 0.5 MCG/KG/HR: 4 INJECTION, SOLUTION INTRAVENOUS at 15:42

## 2018-04-20 RX ADMIN — HYDROMORPHONE HYDROCHLORIDE 0.5 MG: 2 INJECTION INTRAMUSCULAR; INTRAVENOUS; SUBCUTANEOUS at 12:33

## 2018-04-20 RX ADMIN — LORAZEPAM 1 MG: 2 INJECTION INTRAMUSCULAR; INTRAVENOUS at 17:44

## 2018-04-20 RX ADMIN — DEXAMETHASONE SODIUM PHOSPHATE 4 MG: 10 INJECTION INTRAMUSCULAR; INTRAVENOUS at 08:00

## 2018-04-20 RX ADMIN — FENTANYL CITRATE 50 MCG: 50 INJECTION, SOLUTION INTRAMUSCULAR; INTRAVENOUS at 11:48

## 2018-04-20 RX ADMIN — CEFAZOLIN SODIUM 2 G: 2 INJECTION, SOLUTION INTRAVENOUS at 08:00

## 2018-04-20 RX ADMIN — SODIUM CHLORIDE 75 ML/HR: 9 INJECTION, SOLUTION INTRAVENOUS at 12:31

## 2018-04-20 RX ADMIN — CEFAZOLIN SODIUM 2 G: 2 INJECTION, SOLUTION INTRAVENOUS at 17:31

## 2018-04-20 RX ADMIN — PHENYLEPHRINE HYDROCHLORIDE 100 MCG: 10 INJECTION INTRAVENOUS at 08:37

## 2018-04-20 RX ADMIN — SODIUM CHLORIDE, POTASSIUM CHLORIDE, SODIUM LACTATE AND CALCIUM CHLORIDE 9 ML/HR: 600; 310; 30; 20 INJECTION, SOLUTION INTRAVENOUS at 06:48

## 2018-04-20 RX ADMIN — EPHEDRINE SULFATE 10 MG: 50 INJECTION INTRAMUSCULAR; INTRAVENOUS; SUBCUTANEOUS at 08:15

## 2018-04-20 RX ADMIN — FENTANYL CITRATE 50 MCG: 50 INJECTION, SOLUTION INTRAMUSCULAR; INTRAVENOUS at 10:36

## 2018-04-20 RX ADMIN — ROCURONIUM BROMIDE 50 MG: 10 INJECTION INTRAVENOUS at 07:54

## 2018-04-20 RX ADMIN — FENTANYL CITRATE 50 MCG: 50 INJECTION INTRAMUSCULAR; INTRAVENOUS at 07:54

## 2018-04-20 RX ADMIN — MIDAZOLAM HYDROCHLORIDE 1 MG: 2 INJECTION, SOLUTION INTRAMUSCULAR; INTRAVENOUS at 06:50

## 2018-04-20 RX ADMIN — NEOSTIGMINE METHYLSULFATE 2 MG: 1 INJECTION INTRAMUSCULAR; INTRAVENOUS; SUBCUTANEOUS at 09:50

## 2018-04-20 RX ADMIN — MORPHINE SULFATE 4 MG: 4 INJECTION, SOLUTION INTRAMUSCULAR; INTRAVENOUS at 16:00

## 2018-04-20 RX ADMIN — EPHEDRINE SULFATE 10 MG: 50 INJECTION INTRAMUSCULAR; INTRAVENOUS; SUBCUTANEOUS at 08:05

## 2018-04-20 RX ADMIN — HYDROMORPHONE HYDROCHLORIDE 0.5 MG: 2 INJECTION INTRAMUSCULAR; INTRAVENOUS; SUBCUTANEOUS at 12:46

## 2018-04-20 RX ADMIN — IPRATROPIUM BROMIDE AND ALBUTEROL SULFATE 3 ML: .5; 3 SOLUTION RESPIRATORY (INHALATION) at 23:13

## 2018-04-20 RX ADMIN — HYDROMORPHONE HYDROCHLORIDE 0.5 MG: 2 INJECTION INTRAMUSCULAR; INTRAVENOUS; SUBCUTANEOUS at 12:17

## 2018-04-20 RX ADMIN — GLYCOPYRROLATE 0.4 MG: 0.2 INJECTION INTRAMUSCULAR; INTRAVENOUS at 09:50

## 2018-04-20 RX ADMIN — LIDOCAINE HYDROCHLORIDE 60 MG: 20 INJECTION, SOLUTION INFILTRATION; PERINEURAL at 07:54

## 2018-04-20 RX ADMIN — DOCUSATE SODIUM -SENNOSIDES 2 TABLET: 50; 8.6 TABLET, COATED ORAL at 22:50

## 2018-04-20 NOTE — ANESTHESIA PREPROCEDURE EVALUATION
Anesthesia Evaluation     Patient summary reviewed and Nursing notes reviewed                Airway   Mallampati: III  TM distance: >3 FB  Neck ROM: full  Possible difficult intubation and Small opening  Dental - normal exam     Pulmonary    (+) pleural effusion, COPD, rhonchi, decreased breath sounds,     ROS comment: Right effusion  PE comment: Decreased breath sounds on right side  Cardiovascular     (+) pacemaker pacemaker, hypertension, CAD, dysrhythmias Atrial Fib, CHF, DVT, hyperlipidemia,     ROS comment: Pulmonary HTN  PE comment: Paced, with underlying Afib    Neuro/Psych  (+) TIA, psychiatric history,     GI/Hepatic/Renal/Endo    (+)  GERD,      Musculoskeletal     (+) back pain, chronic pain, gait problem,   Abdominal  - normal exam   Substance History   (+) alcohol use,      OB/GYN          Other   (+) blood dyscrasia   history of cancer      Other Comment: Hx of CLL                Anesthesia Plan    ASA 4     general   (Art line/DL ETT)  intravenous induction   Anesthetic plan and risks discussed with patient.

## 2018-04-20 NOTE — ANESTHESIA PROCEDURE NOTES
Airway  Urgency: elective    Date/Time: 4/20/2018 7:55 AM  Airway not difficult    General Information and Staff    Patient location during procedure: OR  Anesthesiologist: CLAUDIA DE LEON  CRNA: ULI CURRY    Indications and Patient Condition  Indications for airway management: airway protection    Preoxygenated: yes  MILS maintained throughout  Mask difficulty assessment: 2 - vent by mask + OA or adjuvant +/- NMBA    Final Airway Details  Final airway type: endotracheal airway      Successful airway: ETT - double lumen left  Cuffed: yes   Successful intubation technique: direct laryngoscopy  Endotracheal tube insertion site: oral  Blade: Kamille  Blade size: #4  ETT DL size: 39 fr  Cormack-Lehane Classification: grade I - full view of glottis  Placement verified by: chest auscultation, bronchoscopy and capnometry   Measured from: teeth  ETT to teeth (cm): 29  Number of attempts at approach: 1    Additional Comments  SIVI atraumatic intubation with 39l SIN, placement verified via bronchoscopy after placement and again after positioning

## 2018-04-20 NOTE — ANESTHESIA POSTPROCEDURE EVALUATION
Patient: Harry Ventura    Procedure Summary     Date:  04/20/18 Room / Location:  [facility] OR [address] [place] [address] MAIN OR    Anesthesia Start:  0744 Anesthesia Stop:  1026    Procedure:  BRONCHOSCOPY, RIGHT VIDEO ASSISTED THORACOSCOPY, RIGHT LUNG DECORTICATION WITH PLEURAL BIOPSY, PLEURODESIS (Right Chest) Diagnosis:       Pleural effusion, right      (Pleural effusion, right [J90])    Surgeon:  Erich Serrano III, MD Provider:  Sherif Mitchell MD    Anesthesia Type:  general ASA Status:  4          Anesthesia Type: general  Last vitals  BP   136/69 (04/20/18 1310)   Temp   36.4 °C (97.6 °F) (04/20/18 1020)   Pulse   74 (04/20/18 1310)   Resp   16 (04/20/18 1310)     SpO2   100 % (04/20/18 1310)     Post Anesthesia Care and Evaluation    Patient location during evaluation: PACU  Patient participation: complete - patient participated  Level of consciousness: awake and alert  Pain management: adequate  Airway patency: patent  Anesthetic complications: No anesthetic complications    Cardiovascular status: acceptable  Respiratory status: acceptable  Hydration status: acceptable    Comments: /69   Pulse 74   Temp 36.4 °C (97.6 °F) (Oral)   Resp 16   SpO2 100%

## 2018-04-20 NOTE — ANESTHESIA PROCEDURE NOTES
Arterial Line    Patient location during procedure: holding area  Start time: 4/20/2018 6:49 AM  Stop Time:4/20/2018 6:58 AM       Line placed for hemodynamic monitoring.  Performed By   Anesthesiologist: CLAUDIA DE LEON  Preanesthetic Checklist  Completed: patient identified, site marked, surgical consent, pre-op evaluation, timeout performed, IV checked, risks and benefits discussed and monitors and equipment checked  Arterial Line Prep   Sterile Tech: cap, gloves and mask  Prep: ChloraPrep  Patient monitoring: blood pressure monitoring, continuous pulse oximetry and EKG  Arterial Line Procedure   Laterality:left  Location:  radial artery  Catheter size: 20 G   Guidance: palpation technique  Number of attempts: 2  Successful placement: yes          Post Assessment   Dressing Type: biopatch applied, occlusive dressing applied, secured with tape and wrist guard applied.   Complications no  Circ/Move/Sens Assessment: normal and unchanged.   Patient Tolerance: patient tolerated the procedure well with no apparent complications

## 2018-04-21 ENCOUNTER — APPOINTMENT (OUTPATIENT)
Dept: GENERAL RADIOLOGY | Facility: HOSPITAL | Age: 83
End: 2018-04-21

## 2018-04-21 LAB
ANION GAP SERPL CALCULATED.3IONS-SCNC: 13.1 MMOL/L
BUN BLD-MCNC: 24 MG/DL (ref 8–23)
BUN/CREAT SERPL: 20.7 (ref 7–25)
CALCIUM SPEC-SCNC: 8.5 MG/DL (ref 8.6–10.5)
CHLORIDE SERPL-SCNC: 103 MMOL/L (ref 98–107)
CO2 SERPL-SCNC: 23.9 MMOL/L (ref 22–29)
CREAT BLD-MCNC: 1.16 MG/DL (ref 0.76–1.27)
DEPRECATED RDW RBC AUTO: 51.3 FL (ref 37–54)
ERYTHROCYTE [DISTWIDTH] IN BLOOD BY AUTOMATED COUNT: 14.4 % (ref 11.5–14.5)
GFR SERPL CREATININE-BSD FRML MDRD: 60 ML/MIN/1.73
GLUCOSE BLD-MCNC: 116 MG/DL (ref 65–99)
HCT VFR BLD AUTO: 34.3 % (ref 40.4–52.2)
HGB BLD-MCNC: 10.6 G/DL (ref 13.7–17.6)
MCH RBC QN AUTO: 30.4 PG (ref 27–32.7)
MCHC RBC AUTO-ENTMCNC: 30.9 G/DL (ref 32.6–36.4)
MCV RBC AUTO: 98.3 FL (ref 79.8–96.2)
PLATELET # BLD AUTO: 180 10*3/MM3 (ref 140–500)
PMV BLD AUTO: 10.9 FL (ref 6–12)
POTASSIUM BLD-SCNC: 5.3 MMOL/L (ref 3.5–5.2)
RBC # BLD AUTO: 3.49 10*6/MM3 (ref 4.6–6)
SODIUM BLD-SCNC: 140 MMOL/L (ref 136–145)
WBC NRBC COR # BLD: 12.13 10*3/MM3 (ref 4.5–10.7)

## 2018-04-21 PROCEDURE — 85027 COMPLETE CBC AUTOMATED: CPT | Performed by: THORACIC SURGERY (CARDIOTHORACIC VASCULAR SURGERY)

## 2018-04-21 PROCEDURE — 97161 PT EVAL LOW COMPLEX 20 MIN: CPT

## 2018-04-21 PROCEDURE — 71045 X-RAY EXAM CHEST 1 VIEW: CPT

## 2018-04-21 PROCEDURE — 80048 BASIC METABOLIC PNL TOTAL CA: CPT | Performed by: THORACIC SURGERY (CARDIOTHORACIC VASCULAR SURGERY)

## 2018-04-21 PROCEDURE — 25010000003 CEFAZOLIN IN DEXTROSE 2-4 GM/100ML-% SOLUTION: Performed by: THORACIC SURGERY (CARDIOTHORACIC VASCULAR SURGERY)

## 2018-04-21 PROCEDURE — 97110 THERAPEUTIC EXERCISES: CPT

## 2018-04-21 PROCEDURE — 99232 SBSQ HOSP IP/OBS MODERATE 35: CPT | Performed by: INTERNAL MEDICINE

## 2018-04-21 PROCEDURE — 25010000002 HEPARIN (PORCINE) PER 1000 UNITS: Performed by: THORACIC SURGERY (CARDIOTHORACIC VASCULAR SURGERY)

## 2018-04-21 PROCEDURE — 94799 UNLISTED PULMONARY SVC/PX: CPT

## 2018-04-21 RX ADMIN — HEPARIN SODIUM 5000 UNITS: 5000 INJECTION, SOLUTION INTRAVENOUS; SUBCUTANEOUS at 22:34

## 2018-04-21 RX ADMIN — HYDROCODONE BITARTRATE AND ACETAMINOPHEN 1 TABLET: 7.5; 325 TABLET ORAL at 13:41

## 2018-04-21 RX ADMIN — HEPARIN SODIUM 5000 UNITS: 5000 INJECTION, SOLUTION INTRAVENOUS; SUBCUTANEOUS at 14:44

## 2018-04-21 RX ADMIN — HYDROCODONE BITARTRATE AND ACETAMINOPHEN 1 TABLET: 7.5; 325 TABLET ORAL at 18:23

## 2018-04-21 RX ADMIN — HYDROCODONE BITARTRATE AND ACETAMINOPHEN 1 TABLET: 7.5; 325 TABLET ORAL at 03:47

## 2018-04-21 RX ADMIN — POLYETHYLENE GLYCOL 3350 17 G: 17 POWDER, FOR SOLUTION ORAL at 08:26

## 2018-04-21 RX ADMIN — DOCUSATE SODIUM -SENNOSIDES 2 TABLET: 50; 8.6 TABLET, COATED ORAL at 20:38

## 2018-04-21 RX ADMIN — IPRATROPIUM BROMIDE AND ALBUTEROL SULFATE 3 ML: .5; 3 SOLUTION RESPIRATORY (INHALATION) at 21:02

## 2018-04-21 RX ADMIN — DOCUSATE SODIUM 100 MG: 100 CAPSULE, LIQUID FILLED ORAL at 20:38

## 2018-04-21 RX ADMIN — CEFAZOLIN SODIUM 2 G: 2 INJECTION, SOLUTION INTRAVENOUS at 00:09

## 2018-04-21 RX ADMIN — DOCUSATE SODIUM 100 MG: 100 CAPSULE, LIQUID FILLED ORAL at 08:26

## 2018-04-21 RX ADMIN — ATORVASTATIN CALCIUM 40 MG: 20 TABLET, FILM COATED ORAL at 08:26

## 2018-04-21 RX ADMIN — FUROSEMIDE 40 MG: 40 TABLET ORAL at 08:26

## 2018-04-21 RX ADMIN — HYDROCODONE BITARTRATE AND ACETAMINOPHEN 1 TABLET: 7.5; 325 TABLET ORAL at 23:26

## 2018-04-21 RX ADMIN — HEPARIN SODIUM 5000 UNITS: 5000 INJECTION, SOLUTION INTRAVENOUS; SUBCUTANEOUS at 06:05

## 2018-04-21 RX ADMIN — IPRATROPIUM BROMIDE AND ALBUTEROL SULFATE 3 ML: .5; 3 SOLUTION RESPIRATORY (INHALATION) at 08:33

## 2018-04-21 RX ADMIN — ASPIRIN 81 MG: 81 TABLET ORAL at 08:26

## 2018-04-21 RX ADMIN — IPRATROPIUM BROMIDE AND ALBUTEROL SULFATE 3 ML: .5; 3 SOLUTION RESPIRATORY (INHALATION) at 14:21

## 2018-04-21 RX ADMIN — METOPROLOL TARTRATE 50 MG: 50 TABLET, FILM COATED ORAL at 20:38

## 2018-04-22 LAB
ANION GAP SERPL CALCULATED.3IONS-SCNC: 13.4 MMOL/L
BUN BLD-MCNC: 26 MG/DL (ref 8–23)
BUN/CREAT SERPL: 25.7 (ref 7–25)
CALCIUM SPEC-SCNC: 8.3 MG/DL (ref 8.6–10.5)
CHLORIDE SERPL-SCNC: 101 MMOL/L (ref 98–107)
CO2 SERPL-SCNC: 23.6 MMOL/L (ref 22–29)
CREAT BLD-MCNC: 1.01 MG/DL (ref 0.76–1.27)
GFR SERPL CREATININE-BSD FRML MDRD: 70 ML/MIN/1.73
GLUCOSE BLD-MCNC: 118 MG/DL (ref 65–99)
POTASSIUM BLD-SCNC: 3.7 MMOL/L (ref 3.5–5.2)
SODIUM BLD-SCNC: 138 MMOL/L (ref 136–145)

## 2018-04-22 PROCEDURE — 25010000002 MORPHINE PER 10 MG: Performed by: THORACIC SURGERY (CARDIOTHORACIC VASCULAR SURGERY)

## 2018-04-22 PROCEDURE — 25810000003 DEXTROSE-NACL PER 500 ML: Performed by: INTERNAL MEDICINE

## 2018-04-22 PROCEDURE — 25010000002 HEPARIN (PORCINE) PER 1000 UNITS: Performed by: THORACIC SURGERY (CARDIOTHORACIC VASCULAR SURGERY)

## 2018-04-22 PROCEDURE — 25010000002 FUROSEMIDE PER 20 MG: Performed by: INTERNAL MEDICINE

## 2018-04-22 PROCEDURE — 94799 UNLISTED PULMONARY SVC/PX: CPT

## 2018-04-22 PROCEDURE — 94667 MNPJ CHEST WALL 1ST: CPT

## 2018-04-22 PROCEDURE — 80048 BASIC METABOLIC PNL TOTAL CA: CPT | Performed by: INTERNAL MEDICINE

## 2018-04-22 PROCEDURE — 25010000002 LORAZEPAM PER 2 MG: Performed by: INTERNAL MEDICINE

## 2018-04-22 RX ORDER — LORAZEPAM 2 MG/ML
1 INJECTION INTRAMUSCULAR
Status: DISCONTINUED | OUTPATIENT
Start: 2018-04-22 | End: 2018-04-30

## 2018-04-22 RX ORDER — DEXTROSE AND SODIUM CHLORIDE 5; .9 G/100ML; G/100ML
10 INJECTION, SOLUTION INTRAVENOUS CONTINUOUS
Status: DISCONTINUED | OUTPATIENT
Start: 2018-04-22 | End: 2018-04-28

## 2018-04-22 RX ORDER — LORAZEPAM 1 MG/1
2 TABLET ORAL
Status: DISCONTINUED | OUTPATIENT
Start: 2018-04-22 | End: 2018-04-30

## 2018-04-22 RX ORDER — LORAZEPAM 2 MG/ML
0.5 INJECTION INTRAMUSCULAR
Status: DISCONTINUED | OUTPATIENT
Start: 2018-04-22 | End: 2018-04-30

## 2018-04-22 RX ORDER — LORAZEPAM 1 MG/1
1 TABLET ORAL
Status: DISCONTINUED | OUTPATIENT
Start: 2018-04-22 | End: 2018-04-30

## 2018-04-22 RX ORDER — SODIUM CHLORIDE FOR INHALATION 7 %
4 VIAL, NEBULIZER (ML) INHALATION EVERY 12 HOURS
Status: DISCONTINUED | OUTPATIENT
Start: 2018-04-23 | End: 2018-04-29

## 2018-04-22 RX ORDER — LORAZEPAM 2 MG/ML
2 INJECTION INTRAMUSCULAR
Status: DISCONTINUED | OUTPATIENT
Start: 2018-04-22 | End: 2018-04-30

## 2018-04-22 RX ORDER — FUROSEMIDE 10 MG/ML
20 INJECTION INTRAMUSCULAR; INTRAVENOUS DAILY
Status: DISCONTINUED | OUTPATIENT
Start: 2018-04-22 | End: 2018-04-24

## 2018-04-22 RX ORDER — IPRATROPIUM BROMIDE AND ALBUTEROL SULFATE 2.5; .5 MG/3ML; MG/3ML
3 SOLUTION RESPIRATORY (INHALATION)
Status: DISCONTINUED | OUTPATIENT
Start: 2018-04-22 | End: 2018-04-29

## 2018-04-22 RX ORDER — LORAZEPAM 0.5 MG/1
0.5 TABLET ORAL
Status: DISCONTINUED | OUTPATIENT
Start: 2018-04-22 | End: 2018-04-30

## 2018-04-22 RX ADMIN — LORAZEPAM 2 MG: 2 INJECTION INTRAMUSCULAR; INTRAVENOUS at 04:01

## 2018-04-22 RX ADMIN — FUROSEMIDE 20 MG: 10 INJECTION, SOLUTION INTRAMUSCULAR; INTRAVENOUS at 08:48

## 2018-04-22 RX ADMIN — MORPHINE SULFATE 2 MG: 2 INJECTION, SOLUTION INTRAMUSCULAR; INTRAVENOUS at 14:55

## 2018-04-22 RX ADMIN — LORAZEPAM 2 MG: 2 INJECTION INTRAMUSCULAR; INTRAVENOUS at 10:01

## 2018-04-22 RX ADMIN — HEPARIN SODIUM 5000 UNITS: 5000 INJECTION, SOLUTION INTRAVENOUS; SUBCUTANEOUS at 14:31

## 2018-04-22 RX ADMIN — LORAZEPAM 1 MG: 2 INJECTION INTRAMUSCULAR; INTRAVENOUS at 03:10

## 2018-04-22 RX ADMIN — IPRATROPIUM BROMIDE AND ALBUTEROL SULFATE 3 ML: .5; 3 SOLUTION RESPIRATORY (INHALATION) at 21:34

## 2018-04-22 RX ADMIN — MORPHINE SULFATE 2 MG: 2 INJECTION, SOLUTION INTRAMUSCULAR; INTRAVENOUS at 20:30

## 2018-04-22 RX ADMIN — IPRATROPIUM BROMIDE AND ALBUTEROL SULFATE 3 ML: .5; 3 SOLUTION RESPIRATORY (INHALATION) at 07:50

## 2018-04-22 RX ADMIN — HEPARIN SODIUM 5000 UNITS: 5000 INJECTION, SOLUTION INTRAVENOUS; SUBCUTANEOUS at 05:20

## 2018-04-22 RX ADMIN — HEPARIN SODIUM 5000 UNITS: 5000 INJECTION, SOLUTION INTRAVENOUS; SUBCUTANEOUS at 21:57

## 2018-04-22 RX ADMIN — DEXTROSE AND SODIUM CHLORIDE 50 ML/HR: 5; 900 INJECTION, SOLUTION INTRAVENOUS at 11:30

## 2018-04-22 RX ADMIN — LORAZEPAM 2 MG: 2 INJECTION INTRAMUSCULAR; INTRAVENOUS at 14:31

## 2018-04-23 ENCOUNTER — APPOINTMENT (OUTPATIENT)
Dept: GENERAL RADIOLOGY | Facility: HOSPITAL | Age: 83
End: 2018-04-23

## 2018-04-23 LAB
ANION GAP SERPL CALCULATED.3IONS-SCNC: 10.2 MMOL/L
BACTERIA SPEC AEROBE CULT: NO GROWTH
BASOPHILS # BLD AUTO: 0.02 10*3/MM3 (ref 0–0.2)
BASOPHILS NFR BLD AUTO: 0.2 % (ref 0–1.5)
BUN BLD-MCNC: 16 MG/DL (ref 8–23)
BUN/CREAT SERPL: 16 (ref 7–25)
CALCIUM SPEC-SCNC: 8.4 MG/DL (ref 8.6–10.5)
CHLORIDE SERPL-SCNC: 101 MMOL/L (ref 98–107)
CO2 SERPL-SCNC: 27.8 MMOL/L (ref 22–29)
CREAT BLD-MCNC: 1 MG/DL (ref 0.76–1.27)
CYTO UR: NORMAL
DEPRECATED RDW RBC AUTO: 51.9 FL (ref 37–54)
EOSINOPHIL # BLD AUTO: 0.32 10*3/MM3 (ref 0–0.7)
EOSINOPHIL NFR BLD AUTO: 3.5 % (ref 0.3–6.2)
ERYTHROCYTE [DISTWIDTH] IN BLOOD BY AUTOMATED COUNT: 14.5 % (ref 11.5–14.5)
GFR SERPL CREATININE-BSD FRML MDRD: 71 ML/MIN/1.73
GLUCOSE BLD-MCNC: 115 MG/DL (ref 65–99)
GRAM STN SPEC: NORMAL
HCT VFR BLD AUTO: 31.6 % (ref 40.4–52.2)
HGB BLD-MCNC: 9.9 G/DL (ref 13.7–17.6)
IMM GRANULOCYTES # BLD: 0.02 10*3/MM3 (ref 0–0.03)
IMM GRANULOCYTES NFR BLD: 0.2 % (ref 0–0.5)
LAB AP CASE REPORT: NORMAL
LAB AP CLINICAL INFORMATION: NORMAL
LYMPHOCYTES # BLD AUTO: 1.78 10*3/MM3 (ref 0.9–4.8)
LYMPHOCYTES NFR BLD AUTO: 19.3 % (ref 19.6–45.3)
Lab: NORMAL
Lab: NORMAL
MCH RBC QN AUTO: 30.6 PG (ref 27–32.7)
MCHC RBC AUTO-ENTMCNC: 31.3 G/DL (ref 32.6–36.4)
MCV RBC AUTO: 97.5 FL (ref 79.8–96.2)
MONOCYTES # BLD AUTO: 1.22 10*3/MM3 (ref 0.2–1.2)
MONOCYTES NFR BLD AUTO: 13.2 % (ref 5–12)
NEUTROPHILS # BLD AUTO: 5.85 10*3/MM3 (ref 1.9–8.1)
NEUTROPHILS NFR BLD AUTO: 63.6 % (ref 42.7–76)
PATH REPORT.FINAL DX SPEC: NORMAL
PATH REPORT.GROSS SPEC: NORMAL
PLATELET # BLD AUTO: 169 10*3/MM3 (ref 140–500)
PMV BLD AUTO: 10.5 FL (ref 6–12)
POTASSIUM BLD-SCNC: 3.7 MMOL/L (ref 3.5–5.2)
RBC # BLD AUTO: 3.24 10*6/MM3 (ref 4.6–6)
SODIUM BLD-SCNC: 139 MMOL/L (ref 136–145)
TROPONIN T SERPL-MCNC: <0.01 NG/ML (ref 0–0.03)
TROPONIN T SERPL-MCNC: <0.01 NG/ML (ref 0–0.03)
WBC NRBC COR # BLD: 9.21 10*3/MM3 (ref 4.5–10.7)

## 2018-04-23 PROCEDURE — 94799 UNLISTED PULMONARY SVC/PX: CPT

## 2018-04-23 PROCEDURE — 84484 ASSAY OF TROPONIN QUANT: CPT | Performed by: INTERNAL MEDICINE

## 2018-04-23 PROCEDURE — 71045 X-RAY EXAM CHEST 1 VIEW: CPT

## 2018-04-23 PROCEDURE — 93005 ELECTROCARDIOGRAM TRACING: CPT | Performed by: INTERNAL MEDICINE

## 2018-04-23 PROCEDURE — 80048 BASIC METABOLIC PNL TOTAL CA: CPT | Performed by: INTERNAL MEDICINE

## 2018-04-23 PROCEDURE — 25010000002 HEPARIN (PORCINE) PER 1000 UNITS: Performed by: THORACIC SURGERY (CARDIOTHORACIC VASCULAR SURGERY)

## 2018-04-23 PROCEDURE — 25010000002 FUROSEMIDE PER 20 MG: Performed by: INTERNAL MEDICINE

## 2018-04-23 PROCEDURE — 99024 POSTOP FOLLOW-UP VISIT: CPT | Performed by: THORACIC SURGERY (CARDIOTHORACIC VASCULAR SURGERY)

## 2018-04-23 PROCEDURE — 94668 MNPJ CHEST WALL SBSQ: CPT

## 2018-04-23 PROCEDURE — 93010 ELECTROCARDIOGRAM REPORT: CPT | Performed by: INTERNAL MEDICINE

## 2018-04-23 PROCEDURE — 85025 COMPLETE CBC W/AUTO DIFF WBC: CPT | Performed by: INTERNAL MEDICINE

## 2018-04-23 PROCEDURE — 25010000002 LORAZEPAM PER 2 MG: Performed by: INTERNAL MEDICINE

## 2018-04-23 PROCEDURE — 25010000002 MORPHINE PER 10 MG: Performed by: THORACIC SURGERY (CARDIOTHORACIC VASCULAR SURGERY)

## 2018-04-23 PROCEDURE — 92610 EVALUATE SWALLOWING FUNCTION: CPT | Performed by: SPEECH-LANGUAGE PATHOLOGIST

## 2018-04-23 PROCEDURE — 99233 SBSQ HOSP IP/OBS HIGH 50: CPT | Performed by: INTERNAL MEDICINE

## 2018-04-23 PROCEDURE — 94640 AIRWAY INHALATION TREATMENT: CPT

## 2018-04-23 PROCEDURE — 25810000003 DEXTROSE-NACL PER 500 ML: Performed by: INTERNAL MEDICINE

## 2018-04-23 PROCEDURE — 97110 THERAPEUTIC EXERCISES: CPT | Performed by: PHYSICAL THERAPIST

## 2018-04-23 RX ORDER — FAMOTIDINE 10 MG/ML
20 INJECTION, SOLUTION INTRAVENOUS EVERY 12 HOURS SCHEDULED
Status: DISCONTINUED | OUTPATIENT
Start: 2018-04-23 | End: 2018-05-02 | Stop reason: CLARIF

## 2018-04-23 RX ADMIN — IPRATROPIUM BROMIDE AND ALBUTEROL SULFATE 3 ML: .5; 3 SOLUTION RESPIRATORY (INHALATION) at 20:38

## 2018-04-23 RX ADMIN — MORPHINE SULFATE 2 MG: 2 INJECTION, SOLUTION INTRAMUSCULAR; INTRAVENOUS at 20:34

## 2018-04-23 RX ADMIN — SODIUM CHLORIDE SOLN NEBU 7% 4 ML: 7 NEBU SOLN at 06:51

## 2018-04-23 RX ADMIN — HEPARIN SODIUM 5000 UNITS: 5000 INJECTION, SOLUTION INTRAVENOUS; SUBCUTANEOUS at 21:14

## 2018-04-23 RX ADMIN — IPRATROPIUM BROMIDE AND ALBUTEROL SULFATE 3 ML: .5; 3 SOLUTION RESPIRATORY (INHALATION) at 06:51

## 2018-04-23 RX ADMIN — IPRATROPIUM BROMIDE AND ALBUTEROL SULFATE 3 ML: .5; 3 SOLUTION RESPIRATORY (INHALATION) at 14:28

## 2018-04-23 RX ADMIN — IPRATROPIUM BROMIDE AND ALBUTEROL SULFATE 3 ML: .5; 3 SOLUTION RESPIRATORY (INHALATION) at 10:55

## 2018-04-23 RX ADMIN — FAMOTIDINE 20 MG: 10 INJECTION INTRAVENOUS at 14:08

## 2018-04-23 RX ADMIN — FAMOTIDINE 20 MG: 10 INJECTION INTRAVENOUS at 20:18

## 2018-04-23 RX ADMIN — DEXTROSE AND SODIUM CHLORIDE 50 ML/HR: 5; 900 INJECTION, SOLUTION INTRAVENOUS at 05:24

## 2018-04-23 RX ADMIN — METOPROLOL TARTRATE 2.5 MG: 5 INJECTION, SOLUTION INTRAVENOUS at 09:49

## 2018-04-23 RX ADMIN — HEPARIN SODIUM 5000 UNITS: 5000 INJECTION, SOLUTION INTRAVENOUS; SUBCUTANEOUS at 14:09

## 2018-04-23 RX ADMIN — SODIUM CHLORIDE SOLN NEBU 7% 4 ML: 7 NEBU SOLN at 00:39

## 2018-04-23 RX ADMIN — FUROSEMIDE 20 MG: 10 INJECTION, SOLUTION INTRAMUSCULAR; INTRAVENOUS at 09:54

## 2018-04-23 RX ADMIN — LORAZEPAM 1 MG: 2 INJECTION INTRAMUSCULAR; INTRAVENOUS at 01:15

## 2018-04-23 RX ADMIN — HEPARIN SODIUM 5000 UNITS: 5000 INJECTION, SOLUTION INTRAVENOUS; SUBCUTANEOUS at 05:24

## 2018-04-23 RX ADMIN — SODIUM CHLORIDE SOLN NEBU 7% 4 ML: 7 NEBU SOLN at 20:44

## 2018-04-23 RX ADMIN — METOPROLOL TARTRATE 2.5 MG: 5 INJECTION, SOLUTION INTRAVENOUS at 18:53

## 2018-04-24 ENCOUNTER — APPOINTMENT (OUTPATIENT)
Dept: GENERAL RADIOLOGY | Facility: HOSPITAL | Age: 83
End: 2018-04-24

## 2018-04-24 LAB
ANION GAP SERPL CALCULATED.3IONS-SCNC: 10.8 MMOL/L
BASOPHILS # BLD AUTO: 0.05 10*3/MM3 (ref 0–0.2)
BASOPHILS NFR BLD AUTO: 0.6 % (ref 0–1.5)
BUN BLD-MCNC: 12 MG/DL (ref 8–23)
BUN/CREAT SERPL: 14.5 (ref 7–25)
CALCIUM SPEC-SCNC: 8.7 MG/DL (ref 8.6–10.5)
CHLORIDE SERPL-SCNC: 103 MMOL/L (ref 98–107)
CO2 SERPL-SCNC: 28.2 MMOL/L (ref 22–29)
CREAT BLD-MCNC: 0.83 MG/DL (ref 0.76–1.27)
DEPRECATED RDW RBC AUTO: 52.1 FL (ref 37–54)
EOSINOPHIL # BLD AUTO: 0.84 10*3/MM3 (ref 0–0.7)
EOSINOPHIL NFR BLD AUTO: 10.3 % (ref 0.3–6.2)
ERYTHROCYTE [DISTWIDTH] IN BLOOD BY AUTOMATED COUNT: 14.5 % (ref 11.5–14.5)
GFR SERPL CREATININE-BSD FRML MDRD: 88 ML/MIN/1.73
GLUCOSE BLD-MCNC: 120 MG/DL (ref 65–99)
HCT VFR BLD AUTO: 32.5 % (ref 40.4–52.2)
HGB BLD-MCNC: 10 G/DL (ref 13.7–17.6)
IMM GRANULOCYTES # BLD: 0 10*3/MM3 (ref 0–0.03)
IMM GRANULOCYTES NFR BLD: 0 % (ref 0–0.5)
LYMPHOCYTES # BLD AUTO: 1.36 10*3/MM3 (ref 0.9–4.8)
LYMPHOCYTES NFR BLD AUTO: 16.7 % (ref 19.6–45.3)
MCH RBC QN AUTO: 30.1 PG (ref 27–32.7)
MCHC RBC AUTO-ENTMCNC: 30.8 G/DL (ref 32.6–36.4)
MCV RBC AUTO: 97.9 FL (ref 79.8–96.2)
MONOCYTES # BLD AUTO: 0.99 10*3/MM3 (ref 0.2–1.2)
MONOCYTES NFR BLD AUTO: 12.2 % (ref 5–12)
NEUTROPHILS # BLD AUTO: 4.9 10*3/MM3 (ref 1.9–8.1)
NEUTROPHILS NFR BLD AUTO: 60.2 % (ref 42.7–76)
NT-PROBNP SERPL-MCNC: 4322 PG/ML (ref 0–1800)
PLATELET # BLD AUTO: 188 10*3/MM3 (ref 140–500)
PMV BLD AUTO: 10.4 FL (ref 6–12)
POTASSIUM BLD-SCNC: 3.4 MMOL/L (ref 3.5–5.2)
RBC # BLD AUTO: 3.32 10*6/MM3 (ref 4.6–6)
SODIUM BLD-SCNC: 142 MMOL/L (ref 136–145)
WBC NRBC COR # BLD: 8.14 10*3/MM3 (ref 4.5–10.7)

## 2018-04-24 PROCEDURE — 80048 BASIC METABOLIC PNL TOTAL CA: CPT | Performed by: INTERNAL MEDICINE

## 2018-04-24 PROCEDURE — 94799 UNLISTED PULMONARY SVC/PX: CPT

## 2018-04-24 PROCEDURE — 25010000002 LORAZEPAM PER 2 MG: Performed by: INTERNAL MEDICINE

## 2018-04-24 PROCEDURE — 25010000002 HALOPERIDOL LACTATE PER 5 MG: Performed by: INTERNAL MEDICINE

## 2018-04-24 PROCEDURE — 85025 COMPLETE CBC W/AUTO DIFF WBC: CPT | Performed by: INTERNAL MEDICINE

## 2018-04-24 PROCEDURE — 94668 MNPJ CHEST WALL SBSQ: CPT

## 2018-04-24 PROCEDURE — 25010000002 FUROSEMIDE PER 20 MG: Performed by: INTERNAL MEDICINE

## 2018-04-24 PROCEDURE — 83880 ASSAY OF NATRIURETIC PEPTIDE: CPT | Performed by: INTERNAL MEDICINE

## 2018-04-24 PROCEDURE — 99232 SBSQ HOSP IP/OBS MODERATE 35: CPT | Performed by: INTERNAL MEDICINE

## 2018-04-24 PROCEDURE — 25010000002 MORPHINE PER 10 MG: Performed by: THORACIC SURGERY (CARDIOTHORACIC VASCULAR SURGERY)

## 2018-04-24 PROCEDURE — 97110 THERAPEUTIC EXERCISES: CPT | Performed by: PHYSICAL THERAPIST

## 2018-04-24 PROCEDURE — 99024 POSTOP FOLLOW-UP VISIT: CPT | Performed by: THORACIC SURGERY (CARDIOTHORACIC VASCULAR SURGERY)

## 2018-04-24 PROCEDURE — 71045 X-RAY EXAM CHEST 1 VIEW: CPT

## 2018-04-24 PROCEDURE — 25010000003 POTASSIUM CHLORIDE 10 MEQ/100ML SOLUTION: Performed by: INTERNAL MEDICINE

## 2018-04-24 PROCEDURE — 25010000002 HEPARIN (PORCINE) PER 1000 UNITS: Performed by: THORACIC SURGERY (CARDIOTHORACIC VASCULAR SURGERY)

## 2018-04-24 PROCEDURE — 92526 ORAL FUNCTION THERAPY: CPT

## 2018-04-24 RX ORDER — HALOPERIDOL 5 MG/ML
5 INJECTION INTRAMUSCULAR EVERY 6 HOURS PRN
Status: DISCONTINUED | OUTPATIENT
Start: 2018-04-24 | End: 2018-05-03 | Stop reason: HOSPADM

## 2018-04-24 RX ORDER — POTASSIUM CHLORIDE 750 MG/1
40 CAPSULE, EXTENDED RELEASE ORAL ONCE
Status: DISCONTINUED | OUTPATIENT
Start: 2018-04-24 | End: 2018-05-03 | Stop reason: HOSPADM

## 2018-04-24 RX ORDER — FUROSEMIDE 10 MG/ML
40 INJECTION INTRAMUSCULAR; INTRAVENOUS DAILY
Status: DISCONTINUED | OUTPATIENT
Start: 2018-04-24 | End: 2018-04-26

## 2018-04-24 RX ORDER — POTASSIUM CHLORIDE 7.45 MG/ML
10 INJECTION INTRAVENOUS
Status: COMPLETED | OUTPATIENT
Start: 2018-04-24 | End: 2018-04-24

## 2018-04-24 RX ADMIN — HEPARIN SODIUM 5000 UNITS: 5000 INJECTION, SOLUTION INTRAVENOUS; SUBCUTANEOUS at 06:00

## 2018-04-24 RX ADMIN — IPRATROPIUM BROMIDE AND ALBUTEROL SULFATE 3 ML: .5; 3 SOLUTION RESPIRATORY (INHALATION) at 15:55

## 2018-04-24 RX ADMIN — POTASSIUM CHLORIDE 10 MEQ: 10 INJECTION, SOLUTION INTRAVENOUS at 18:45

## 2018-04-24 RX ADMIN — POTASSIUM CHLORIDE 10 MEQ: 10 INJECTION, SOLUTION INTRAVENOUS at 17:25

## 2018-04-24 RX ADMIN — POTASSIUM CHLORIDE 10 MEQ: 10 INJECTION, SOLUTION INTRAVENOUS at 15:50

## 2018-04-24 RX ADMIN — FUROSEMIDE 20 MG: 10 INJECTION, SOLUTION INTRAMUSCULAR; INTRAVENOUS at 08:44

## 2018-04-24 RX ADMIN — HALOPERIDOL LACTATE 5 MG: 5 INJECTION, SOLUTION INTRAMUSCULAR at 21:30

## 2018-04-24 RX ADMIN — METOPROLOL TARTRATE 5 MG: 5 INJECTION, SOLUTION INTRAVENOUS at 17:25

## 2018-04-24 RX ADMIN — IPRATROPIUM BROMIDE AND ALBUTEROL SULFATE 3 ML: .5; 3 SOLUTION RESPIRATORY (INHALATION) at 12:20

## 2018-04-24 RX ADMIN — LORAZEPAM 0.5 MG: 2 INJECTION INTRAMUSCULAR; INTRAVENOUS at 20:03

## 2018-04-24 RX ADMIN — HEPARIN SODIUM 5000 UNITS: 5000 INJECTION, SOLUTION INTRAVENOUS; SUBCUTANEOUS at 14:13

## 2018-04-24 RX ADMIN — FUROSEMIDE 40 MG: 10 INJECTION, SOLUTION INTRAMUSCULAR; INTRAVENOUS at 17:26

## 2018-04-24 RX ADMIN — METOPROLOL TARTRATE 2.5 MG: 5 INJECTION, SOLUTION INTRAVENOUS at 08:43

## 2018-04-24 RX ADMIN — POTASSIUM CHLORIDE 10 MEQ: 10 INJECTION, SOLUTION INTRAVENOUS at 14:19

## 2018-04-24 RX ADMIN — HEPARIN SODIUM 5000 UNITS: 5000 INJECTION, SOLUTION INTRAVENOUS; SUBCUTANEOUS at 21:32

## 2018-04-24 RX ADMIN — MORPHINE SULFATE 2 MG: 2 INJECTION, SOLUTION INTRAMUSCULAR; INTRAVENOUS at 17:39

## 2018-04-24 RX ADMIN — IPRATROPIUM BROMIDE AND ALBUTEROL SULFATE 3 ML: .5; 3 SOLUTION RESPIRATORY (INHALATION) at 07:42

## 2018-04-24 RX ADMIN — SODIUM CHLORIDE SOLN NEBU 7% 4 ML: 7 NEBU SOLN at 07:42

## 2018-04-24 RX ADMIN — FAMOTIDINE 20 MG: 10 INJECTION INTRAVENOUS at 08:45

## 2018-04-24 RX ADMIN — METOPROLOL TARTRATE 2.5 MG: 5 INJECTION, SOLUTION INTRAVENOUS at 01:02

## 2018-04-24 RX ADMIN — FAMOTIDINE 20 MG: 10 INJECTION INTRAVENOUS at 21:32

## 2018-04-25 ENCOUNTER — APPOINTMENT (OUTPATIENT)
Dept: GENERAL RADIOLOGY | Facility: HOSPITAL | Age: 83
End: 2018-04-25

## 2018-04-25 LAB
ALBUMIN SERPL-MCNC: 2.8 G/DL (ref 3.5–5.2)
ALBUMIN/GLOB SERPL: 0.8 G/DL
ALP SERPL-CCNC: 93 U/L (ref 39–117)
ALT SERPL W P-5'-P-CCNC: 8 U/L (ref 1–41)
ANION GAP SERPL CALCULATED.3IONS-SCNC: 11.1 MMOL/L
AST SERPL-CCNC: 23 U/L (ref 1–40)
BASOPHILS # BLD AUTO: 0.02 10*3/MM3 (ref 0–0.2)
BASOPHILS NFR BLD AUTO: 0.2 % (ref 0–1.5)
BILIRUB SERPL-MCNC: 0.6 MG/DL (ref 0.1–1.2)
BUN BLD-MCNC: 12 MG/DL (ref 8–23)
BUN/CREAT SERPL: 13.8 (ref 7–25)
CALCIUM SPEC-SCNC: 9.1 MG/DL (ref 8.6–10.5)
CHLORIDE SERPL-SCNC: 101 MMOL/L (ref 98–107)
CO2 SERPL-SCNC: 30.9 MMOL/L (ref 22–29)
CREAT BLD-MCNC: 0.87 MG/DL (ref 0.76–1.27)
DEPRECATED RDW RBC AUTO: 50.5 FL (ref 37–54)
EOSINOPHIL # BLD AUTO: 0.55 10*3/MM3 (ref 0–0.7)
EOSINOPHIL NFR BLD AUTO: 6.1 % (ref 0.3–6.2)
ERYTHROCYTE [DISTWIDTH] IN BLOOD BY AUTOMATED COUNT: 14.2 % (ref 11.5–14.5)
GFR SERPL CREATININE-BSD FRML MDRD: 83 ML/MIN/1.73
GLOBULIN UR ELPH-MCNC: 3.5 GM/DL
GLUCOSE BLD-MCNC: 108 MG/DL (ref 65–99)
GLUCOSE BLDC GLUCOMTR-MCNC: 104 MG/DL (ref 70–130)
GLUCOSE BLDC GLUCOMTR-MCNC: 117 MG/DL (ref 70–130)
HCT VFR BLD AUTO: 34.9 % (ref 40.4–52.2)
HGB BLD-MCNC: 10.8 G/DL (ref 13.7–17.6)
IMM GRANULOCYTES # BLD: 0.02 10*3/MM3 (ref 0–0.03)
IMM GRANULOCYTES NFR BLD: 0.2 % (ref 0–0.5)
LYMPHOCYTES # BLD AUTO: 1.98 10*3/MM3 (ref 0.9–4.8)
LYMPHOCYTES NFR BLD AUTO: 21.8 % (ref 19.6–45.3)
MCH RBC QN AUTO: 29.9 PG (ref 27–32.7)
MCHC RBC AUTO-ENTMCNC: 30.9 G/DL (ref 32.6–36.4)
MCV RBC AUTO: 96.7 FL (ref 79.8–96.2)
MONOCYTES # BLD AUTO: 0.94 10*3/MM3 (ref 0.2–1.2)
MONOCYTES NFR BLD AUTO: 10.4 % (ref 5–12)
NEUTROPHILS # BLD AUTO: 5.56 10*3/MM3 (ref 1.9–8.1)
NEUTROPHILS NFR BLD AUTO: 61.3 % (ref 42.7–76)
PLATELET # BLD AUTO: 228 10*3/MM3 (ref 140–500)
PMV BLD AUTO: 10.3 FL (ref 6–12)
POTASSIUM BLD-SCNC: 3.7 MMOL/L (ref 3.5–5.2)
PROT SERPL-MCNC: 6.3 G/DL (ref 6–8.5)
RBC # BLD AUTO: 3.61 10*6/MM3 (ref 4.6–6)
SODIUM BLD-SCNC: 143 MMOL/L (ref 136–145)
WBC NRBC COR # BLD: 9.07 10*3/MM3 (ref 4.5–10.7)

## 2018-04-25 PROCEDURE — 92612 ENDOSCOPY SWALLOW (FEES) VID: CPT

## 2018-04-25 PROCEDURE — 94799 UNLISTED PULMONARY SVC/PX: CPT

## 2018-04-25 PROCEDURE — 85025 COMPLETE CBC W/AUTO DIFF WBC: CPT | Performed by: INTERNAL MEDICINE

## 2018-04-25 PROCEDURE — 25010000002 MORPHINE SULFATE (PF) 2 MG/ML SOLUTION: Performed by: THORACIC SURGERY (CARDIOTHORACIC VASCULAR SURGERY)

## 2018-04-25 PROCEDURE — 25010000002 MORPHINE PER 10 MG: Performed by: THORACIC SURGERY (CARDIOTHORACIC VASCULAR SURGERY)

## 2018-04-25 PROCEDURE — 82962 GLUCOSE BLOOD TEST: CPT

## 2018-04-25 PROCEDURE — 94668 MNPJ CHEST WALL SBSQ: CPT

## 2018-04-25 PROCEDURE — 99232 SBSQ HOSP IP/OBS MODERATE 35: CPT | Performed by: INTERNAL MEDICINE

## 2018-04-25 PROCEDURE — 25010000002 HALOPERIDOL LACTATE PER 5 MG: Performed by: INTERNAL MEDICINE

## 2018-04-25 PROCEDURE — 99024 POSTOP FOLLOW-UP VISIT: CPT | Performed by: THORACIC SURGERY (CARDIOTHORACIC VASCULAR SURGERY)

## 2018-04-25 PROCEDURE — 97110 THERAPEUTIC EXERCISES: CPT | Performed by: PHYSICAL THERAPIST

## 2018-04-25 PROCEDURE — 25010000002 HEPARIN (PORCINE) PER 1000 UNITS: Performed by: THORACIC SURGERY (CARDIOTHORACIC VASCULAR SURGERY)

## 2018-04-25 PROCEDURE — 71045 X-RAY EXAM CHEST 1 VIEW: CPT

## 2018-04-25 PROCEDURE — 25010000002 FUROSEMIDE PER 20 MG: Performed by: INTERNAL MEDICINE

## 2018-04-25 PROCEDURE — 80053 COMPREHEN METABOLIC PANEL: CPT | Performed by: THORACIC SURGERY (CARDIOTHORACIC VASCULAR SURGERY)

## 2018-04-25 RX ORDER — CETIRIZINE HYDROCHLORIDE 10 MG/1
10 TABLET ORAL DAILY
COMMUNITY
End: 2018-10-30

## 2018-04-25 RX ORDER — LOSARTAN POTASSIUM 25 MG/1
25 TABLET ORAL
Status: DISCONTINUED | OUTPATIENT
Start: 2018-04-25 | End: 2018-05-03 | Stop reason: HOSPADM

## 2018-04-25 RX ORDER — METOPROLOL SUCCINATE 50 MG/1
50 TABLET, EXTENDED RELEASE ORAL 2 TIMES DAILY
COMMUNITY
End: 2018-05-03 | Stop reason: HOSPADM

## 2018-04-25 RX ORDER — METOPROLOL TARTRATE 50 MG/1
50 TABLET, FILM COATED ORAL EVERY 12 HOURS SCHEDULED
Status: DISCONTINUED | OUTPATIENT
Start: 2018-04-25 | End: 2018-04-27

## 2018-04-25 RX ORDER — FLUTICASONE PROPIONATE 50 MCG
2 SPRAY, SUSPENSION (ML) NASAL AS NEEDED
COMMUNITY
End: 2019-09-26

## 2018-04-25 RX ADMIN — FUROSEMIDE 40 MG: 10 INJECTION, SOLUTION INTRAMUSCULAR; INTRAVENOUS at 10:52

## 2018-04-25 RX ADMIN — ASPIRIN 81 MG: 81 TABLET ORAL at 13:39

## 2018-04-25 RX ADMIN — MORPHINE SULFATE 2 MG: 2 INJECTION, SOLUTION INTRAMUSCULAR; INTRAVENOUS at 03:10

## 2018-04-25 RX ADMIN — ATORVASTATIN CALCIUM 40 MG: 20 TABLET, FILM COATED ORAL at 13:39

## 2018-04-25 RX ADMIN — SODIUM CHLORIDE SOLN NEBU 7% 4 ML: 7 NEBU SOLN at 20:37

## 2018-04-25 RX ADMIN — DOCUSATE SODIUM 100 MG: 100 CAPSULE, LIQUID FILLED ORAL at 13:42

## 2018-04-25 RX ADMIN — HEPARIN SODIUM 5000 UNITS: 5000 INJECTION, SOLUTION INTRAVENOUS; SUBCUTANEOUS at 13:40

## 2018-04-25 RX ADMIN — FAMOTIDINE 20 MG: 10 INJECTION INTRAVENOUS at 10:56

## 2018-04-25 RX ADMIN — SODIUM CHLORIDE SOLN NEBU 7% 4 ML: 7 NEBU SOLN at 07:37

## 2018-04-25 RX ADMIN — METOPROLOL TARTRATE 5 MG: 5 INJECTION, SOLUTION INTRAVENOUS at 00:08

## 2018-04-25 RX ADMIN — IPRATROPIUM BROMIDE AND ALBUTEROL SULFATE 3 ML: .5; 3 SOLUTION RESPIRATORY (INHALATION) at 12:11

## 2018-04-25 RX ADMIN — HALOPERIDOL LACTATE 5 MG: 5 INJECTION, SOLUTION INTRAMUSCULAR at 16:30

## 2018-04-25 RX ADMIN — DOCUSATE SODIUM -SENNOSIDES 2 TABLET: 50; 8.6 TABLET, COATED ORAL at 20:39

## 2018-04-25 RX ADMIN — POLYETHYLENE GLYCOL 3350 17 G: 17 POWDER, FOR SOLUTION ORAL at 13:43

## 2018-04-25 RX ADMIN — IPRATROPIUM BROMIDE AND ALBUTEROL SULFATE 3 ML: .5; 3 SOLUTION RESPIRATORY (INHALATION) at 20:37

## 2018-04-25 RX ADMIN — LOSARTAN POTASSIUM 25 MG: 25 TABLET, FILM COATED ORAL at 13:39

## 2018-04-25 RX ADMIN — HEPARIN SODIUM 5000 UNITS: 5000 INJECTION, SOLUTION INTRAVENOUS; SUBCUTANEOUS at 06:00

## 2018-04-25 RX ADMIN — METOPROLOL TARTRATE 50 MG: 50 TABLET, FILM COATED ORAL at 13:39

## 2018-04-25 RX ADMIN — FAMOTIDINE 20 MG: 10 INJECTION INTRAVENOUS at 20:39

## 2018-04-25 RX ADMIN — HEPARIN SODIUM 5000 UNITS: 5000 INJECTION, SOLUTION INTRAVENOUS; SUBCUTANEOUS at 21:39

## 2018-04-25 RX ADMIN — IPRATROPIUM BROMIDE AND ALBUTEROL SULFATE 3 ML: .5; 3 SOLUTION RESPIRATORY (INHALATION) at 16:49

## 2018-04-25 RX ADMIN — MORPHINE SULFATE 2 MG: 2 INJECTION, SOLUTION INTRAMUSCULAR; INTRAVENOUS at 11:46

## 2018-04-25 RX ADMIN — IPRATROPIUM BROMIDE AND ALBUTEROL SULFATE 3 ML: .5; 3 SOLUTION RESPIRATORY (INHALATION) at 07:37

## 2018-04-25 RX ADMIN — HYDROCODONE BITARTRATE AND ACETAMINOPHEN 1 TABLET: 7.5; 325 TABLET ORAL at 15:03

## 2018-04-26 ENCOUNTER — APPOINTMENT (OUTPATIENT)
Dept: GENERAL RADIOLOGY | Facility: HOSPITAL | Age: 83
End: 2018-04-26

## 2018-04-26 LAB
ANION GAP SERPL CALCULATED.3IONS-SCNC: 9.5 MMOL/L
BASOPHILS # BLD AUTO: 0.03 10*3/MM3 (ref 0–0.2)
BASOPHILS NFR BLD AUTO: 0.3 % (ref 0–1.5)
BUN BLD-MCNC: 16 MG/DL (ref 8–23)
BUN/CREAT SERPL: 15.4 (ref 7–25)
CALCIUM SPEC-SCNC: 8.9 MG/DL (ref 8.6–10.5)
CHLORIDE SERPL-SCNC: 98 MMOL/L (ref 98–107)
CO2 SERPL-SCNC: 33.5 MMOL/L (ref 22–29)
CREAT BLD-MCNC: 1.04 MG/DL (ref 0.76–1.27)
DEPRECATED RDW RBC AUTO: 51.1 FL (ref 37–54)
EOSINOPHIL # BLD AUTO: 0.76 10*3/MM3 (ref 0–0.7)
EOSINOPHIL NFR BLD AUTO: 8.6 % (ref 0.3–6.2)
ERYTHROCYTE [DISTWIDTH] IN BLOOD BY AUTOMATED COUNT: 14.4 % (ref 11.5–14.5)
GFR SERPL CREATININE-BSD FRML MDRD: 68 ML/MIN/1.73
GLUCOSE BLD-MCNC: 122 MG/DL (ref 65–99)
GLUCOSE BLDC GLUCOMTR-MCNC: 125 MG/DL (ref 70–130)
HCT VFR BLD AUTO: 31.8 % (ref 40.4–52.2)
HGB BLD-MCNC: 9.9 G/DL (ref 13.7–17.6)
IMM GRANULOCYTES # BLD: 0.02 10*3/MM3 (ref 0–0.03)
IMM GRANULOCYTES NFR BLD: 0.2 % (ref 0–0.5)
LYMPHOCYTES # BLD AUTO: 1.61 10*3/MM3 (ref 0.9–4.8)
LYMPHOCYTES NFR BLD AUTO: 18.3 % (ref 19.6–45.3)
MCH RBC QN AUTO: 30.2 PG (ref 27–32.7)
MCHC RBC AUTO-ENTMCNC: 31.1 G/DL (ref 32.6–36.4)
MCV RBC AUTO: 97 FL (ref 79.8–96.2)
MONOCYTES # BLD AUTO: 1.07 10*3/MM3 (ref 0.2–1.2)
MONOCYTES NFR BLD AUTO: 12.2 % (ref 5–12)
NEUTROPHILS # BLD AUTO: 5.31 10*3/MM3 (ref 1.9–8.1)
NEUTROPHILS NFR BLD AUTO: 60.4 % (ref 42.7–76)
PLATELET # BLD AUTO: 226 10*3/MM3 (ref 140–500)
PMV BLD AUTO: 10.3 FL (ref 6–12)
POTASSIUM BLD-SCNC: 3.4 MMOL/L (ref 3.5–5.2)
RBC # BLD AUTO: 3.28 10*6/MM3 (ref 4.6–6)
SODIUM BLD-SCNC: 141 MMOL/L (ref 136–145)
WBC NRBC COR # BLD: 8.8 10*3/MM3 (ref 4.5–10.7)

## 2018-04-26 PROCEDURE — 94799 UNLISTED PULMONARY SVC/PX: CPT

## 2018-04-26 PROCEDURE — 25010000002 FUROSEMIDE PER 20 MG: Performed by: INTERNAL MEDICINE

## 2018-04-26 PROCEDURE — 99024 POSTOP FOLLOW-UP VISIT: CPT | Performed by: THORACIC SURGERY (CARDIOTHORACIC VASCULAR SURGERY)

## 2018-04-26 PROCEDURE — 80048 BASIC METABOLIC PNL TOTAL CA: CPT | Performed by: NURSE PRACTITIONER

## 2018-04-26 PROCEDURE — 25010000002 HEPARIN (PORCINE) PER 1000 UNITS: Performed by: THORACIC SURGERY (CARDIOTHORACIC VASCULAR SURGERY)

## 2018-04-26 PROCEDURE — 71045 X-RAY EXAM CHEST 1 VIEW: CPT

## 2018-04-26 PROCEDURE — 82962 GLUCOSE BLOOD TEST: CPT

## 2018-04-26 PROCEDURE — 85025 COMPLETE CBC W/AUTO DIFF WBC: CPT | Performed by: INTERNAL MEDICINE

## 2018-04-26 PROCEDURE — 99232 SBSQ HOSP IP/OBS MODERATE 35: CPT | Performed by: NURSE PRACTITIONER

## 2018-04-26 PROCEDURE — 97110 THERAPEUTIC EXERCISES: CPT

## 2018-04-26 RX ORDER — POTASSIUM CHLORIDE 750 MG/1
40 CAPSULE, EXTENDED RELEASE ORAL AS NEEDED
Status: DISCONTINUED | OUTPATIENT
Start: 2018-04-26 | End: 2018-05-03 | Stop reason: HOSPADM

## 2018-04-26 RX ORDER — POTASSIUM CHLORIDE 1.5 G/1.77G
40 POWDER, FOR SOLUTION ORAL AS NEEDED
Status: DISCONTINUED | OUTPATIENT
Start: 2018-04-26 | End: 2018-05-03 | Stop reason: HOSPADM

## 2018-04-26 RX ORDER — FUROSEMIDE 40 MG/1
40 TABLET ORAL DAILY
Status: DISCONTINUED | OUTPATIENT
Start: 2018-04-26 | End: 2018-05-03 | Stop reason: HOSPADM

## 2018-04-26 RX ADMIN — METOPROLOL TARTRATE 50 MG: 50 TABLET, FILM COATED ORAL at 09:00

## 2018-04-26 RX ADMIN — FUROSEMIDE 40 MG: 10 INJECTION, SOLUTION INTRAMUSCULAR; INTRAVENOUS at 09:00

## 2018-04-26 RX ADMIN — POTASSIUM CHLORIDE 40 MEQ: 1.5 POWDER, FOR SOLUTION ORAL at 23:03

## 2018-04-26 RX ADMIN — DOCUSATE SODIUM -SENNOSIDES 2 TABLET: 50; 8.6 TABLET, COATED ORAL at 20:51

## 2018-04-26 RX ADMIN — HEPARIN SODIUM 5000 UNITS: 5000 INJECTION, SOLUTION INTRAVENOUS; SUBCUTANEOUS at 14:55

## 2018-04-26 RX ADMIN — SODIUM CHLORIDE SOLN NEBU 7% 4 ML: 7 NEBU SOLN at 07:38

## 2018-04-26 RX ADMIN — HYDROCODONE BITARTRATE AND ACETAMINOPHEN 1 TABLET: 7.5; 325 TABLET ORAL at 05:54

## 2018-04-26 RX ADMIN — FAMOTIDINE 20 MG: 10 INJECTION INTRAVENOUS at 09:00

## 2018-04-26 RX ADMIN — ATORVASTATIN CALCIUM 40 MG: 20 TABLET, FILM COATED ORAL at 09:34

## 2018-04-26 RX ADMIN — HEPARIN SODIUM 5000 UNITS: 5000 INJECTION, SOLUTION INTRAVENOUS; SUBCUTANEOUS at 22:20

## 2018-04-26 RX ADMIN — LOSARTAN POTASSIUM 25 MG: 25 TABLET, FILM COATED ORAL at 09:00

## 2018-04-26 RX ADMIN — POTASSIUM CHLORIDE 40 MEQ: 750 CAPSULE, EXTENDED RELEASE ORAL at 18:01

## 2018-04-26 RX ADMIN — ACETAMINOPHEN 650 MG: 325 TABLET, FILM COATED ORAL at 15:13

## 2018-04-26 RX ADMIN — FAMOTIDINE 20 MG: 10 INJECTION INTRAVENOUS at 20:51

## 2018-04-26 RX ADMIN — POLYETHYLENE GLYCOL 3350 17 G: 17 POWDER, FOR SOLUTION ORAL at 09:00

## 2018-04-26 RX ADMIN — ASPIRIN 81 MG: 81 TABLET ORAL at 09:34

## 2018-04-26 RX ADMIN — HEPARIN SODIUM 5000 UNITS: 5000 INJECTION, SOLUTION INTRAVENOUS; SUBCUTANEOUS at 05:54

## 2018-04-26 RX ADMIN — IPRATROPIUM BROMIDE AND ALBUTEROL SULFATE 3 ML: .5; 3 SOLUTION RESPIRATORY (INHALATION) at 07:38

## 2018-04-26 RX ADMIN — METOPROLOL TARTRATE 50 MG: 50 TABLET, FILM COATED ORAL at 20:51

## 2018-04-26 RX ADMIN — IPRATROPIUM BROMIDE AND ALBUTEROL SULFATE 3 ML: .5; 3 SOLUTION RESPIRATORY (INHALATION) at 16:09

## 2018-04-26 RX ADMIN — IPRATROPIUM BROMIDE AND ALBUTEROL SULFATE 3 ML: .5; 3 SOLUTION RESPIRATORY (INHALATION) at 11:30

## 2018-04-27 ENCOUNTER — APPOINTMENT (OUTPATIENT)
Dept: GENERAL RADIOLOGY | Facility: HOSPITAL | Age: 83
End: 2018-04-27

## 2018-04-27 LAB
ANION GAP SERPL CALCULATED.3IONS-SCNC: 12.8 MMOL/L
BASOPHILS # BLD AUTO: 0.03 10*3/MM3 (ref 0–0.2)
BASOPHILS NFR BLD AUTO: 0.2 % (ref 0–1.5)
BUN BLD-MCNC: 21 MG/DL (ref 8–23)
BUN/CREAT SERPL: 20 (ref 7–25)
CALCIUM SPEC-SCNC: 9.3 MG/DL (ref 8.6–10.5)
CHLORIDE SERPL-SCNC: 97 MMOL/L (ref 98–107)
CO2 SERPL-SCNC: 30.2 MMOL/L (ref 22–29)
CREAT BLD-MCNC: 1.05 MG/DL (ref 0.76–1.27)
DEPRECATED RDW RBC AUTO: 51.3 FL (ref 37–54)
EOSINOPHIL # BLD AUTO: 0.47 10*3/MM3 (ref 0–0.7)
EOSINOPHIL NFR BLD AUTO: 3.7 % (ref 0.3–6.2)
ERYTHROCYTE [DISTWIDTH] IN BLOOD BY AUTOMATED COUNT: 14.4 % (ref 11.5–14.5)
GFR SERPL CREATININE-BSD FRML MDRD: 67 ML/MIN/1.73
GLUCOSE BLD-MCNC: 113 MG/DL (ref 65–99)
GLUCOSE BLDC GLUCOMTR-MCNC: 107 MG/DL (ref 70–130)
GLUCOSE BLDC GLUCOMTR-MCNC: 107 MG/DL (ref 70–130)
GLUCOSE BLDC GLUCOMTR-MCNC: 121 MG/DL (ref 70–130)
GLUCOSE BLDC GLUCOMTR-MCNC: 129 MG/DL (ref 70–130)
HCT VFR BLD AUTO: 34.2 % (ref 40.4–52.2)
HGB BLD-MCNC: 10.5 G/DL (ref 13.7–17.6)
IMM GRANULOCYTES # BLD: 0.03 10*3/MM3 (ref 0–0.03)
IMM GRANULOCYTES NFR BLD: 0.2 % (ref 0–0.5)
LYMPHOCYTES # BLD AUTO: 1.71 10*3/MM3 (ref 0.9–4.8)
LYMPHOCYTES NFR BLD AUTO: 13.3 % (ref 19.6–45.3)
MCH RBC QN AUTO: 29.8 PG (ref 27–32.7)
MCHC RBC AUTO-ENTMCNC: 30.7 G/DL (ref 32.6–36.4)
MCV RBC AUTO: 97.2 FL (ref 79.8–96.2)
MONOCYTES # BLD AUTO: 1.6 10*3/MM3 (ref 0.2–1.2)
MONOCYTES NFR BLD AUTO: 12.5 % (ref 5–12)
NEUTROPHILS # BLD AUTO: 9 10*3/MM3 (ref 1.9–8.1)
NEUTROPHILS NFR BLD AUTO: 70.1 % (ref 42.7–76)
PLATELET # BLD AUTO: 254 10*3/MM3 (ref 140–500)
PMV BLD AUTO: 10.5 FL (ref 6–12)
POTASSIUM BLD-SCNC: 4.4 MMOL/L (ref 3.5–5.2)
RBC # BLD AUTO: 3.52 10*6/MM3 (ref 4.6–6)
SODIUM BLD-SCNC: 140 MMOL/L (ref 136–145)
WBC NRBC COR # BLD: 12.84 10*3/MM3 (ref 4.5–10.7)

## 2018-04-27 PROCEDURE — 94799 UNLISTED PULMONARY SVC/PX: CPT

## 2018-04-27 PROCEDURE — 97166 OT EVAL MOD COMPLEX 45 MIN: CPT | Performed by: OCCUPATIONAL THERAPIST

## 2018-04-27 PROCEDURE — 99232 SBSQ HOSP IP/OBS MODERATE 35: CPT | Performed by: INTERNAL MEDICINE

## 2018-04-27 PROCEDURE — 97110 THERAPEUTIC EXERCISES: CPT

## 2018-04-27 PROCEDURE — 71045 X-RAY EXAM CHEST 1 VIEW: CPT

## 2018-04-27 PROCEDURE — 99024 POSTOP FOLLOW-UP VISIT: CPT | Performed by: THORACIC SURGERY (CARDIOTHORACIC VASCULAR SURGERY)

## 2018-04-27 PROCEDURE — 25010000002 HEPARIN (PORCINE) PER 1000 UNITS: Performed by: THORACIC SURGERY (CARDIOTHORACIC VASCULAR SURGERY)

## 2018-04-27 PROCEDURE — 97535 SELF CARE MNGMENT TRAINING: CPT | Performed by: OCCUPATIONAL THERAPIST

## 2018-04-27 PROCEDURE — 25010000002 HALOPERIDOL LACTATE PER 5 MG: Performed by: INTERNAL MEDICINE

## 2018-04-27 PROCEDURE — 80048 BASIC METABOLIC PNL TOTAL CA: CPT | Performed by: NURSE PRACTITIONER

## 2018-04-27 PROCEDURE — 82962 GLUCOSE BLOOD TEST: CPT

## 2018-04-27 PROCEDURE — 85025 COMPLETE CBC W/AUTO DIFF WBC: CPT | Performed by: INTERNAL MEDICINE

## 2018-04-27 RX ORDER — CHOLECALCIFEROL (VITAMIN D3) 125 MCG
5 CAPSULE ORAL NIGHTLY PRN
Status: DISCONTINUED | OUTPATIENT
Start: 2018-04-27 | End: 2018-05-03 | Stop reason: HOSPADM

## 2018-04-27 RX ADMIN — IPRATROPIUM BROMIDE AND ALBUTEROL SULFATE 3 ML: .5; 3 SOLUTION RESPIRATORY (INHALATION) at 11:17

## 2018-04-27 RX ADMIN — IPRATROPIUM BROMIDE AND ALBUTEROL SULFATE 3 ML: .5; 3 SOLUTION RESPIRATORY (INHALATION) at 15:23

## 2018-04-27 RX ADMIN — SODIUM CHLORIDE SOLN NEBU 7% 4 ML: 7 NEBU SOLN at 20:17

## 2018-04-27 RX ADMIN — FAMOTIDINE 20 MG: 10 INJECTION INTRAVENOUS at 21:10

## 2018-04-27 RX ADMIN — HEPARIN SODIUM 5000 UNITS: 5000 INJECTION, SOLUTION INTRAVENOUS; SUBCUTANEOUS at 21:10

## 2018-04-27 RX ADMIN — IPRATROPIUM BROMIDE AND ALBUTEROL SULFATE 3 ML: .5; 3 SOLUTION RESPIRATORY (INHALATION) at 20:10

## 2018-04-27 RX ADMIN — HEPARIN SODIUM 5000 UNITS: 5000 INJECTION, SOLUTION INTRAVENOUS; SUBCUTANEOUS at 14:30

## 2018-04-27 RX ADMIN — HEPARIN SODIUM 5000 UNITS: 5000 INJECTION, SOLUTION INTRAVENOUS; SUBCUTANEOUS at 06:25

## 2018-04-27 RX ADMIN — HALOPERIDOL LACTATE 5 MG: 5 INJECTION, SOLUTION INTRAMUSCULAR at 02:21

## 2018-04-28 LAB
BASOPHILS # BLD AUTO: 0.03 10*3/MM3 (ref 0–0.2)
BASOPHILS NFR BLD AUTO: 0.3 % (ref 0–1.5)
DEPRECATED RDW RBC AUTO: 53.2 FL (ref 37–54)
EOSINOPHIL # BLD AUTO: 0.6 10*3/MM3 (ref 0–0.7)
EOSINOPHIL NFR BLD AUTO: 6.3 % (ref 0.3–6.2)
ERYTHROCYTE [DISTWIDTH] IN BLOOD BY AUTOMATED COUNT: 14.7 % (ref 11.5–14.5)
GLUCOSE BLDC GLUCOMTR-MCNC: 128 MG/DL (ref 70–130)
GLUCOSE BLDC GLUCOMTR-MCNC: 132 MG/DL (ref 70–130)
GLUCOSE BLDC GLUCOMTR-MCNC: 137 MG/DL (ref 70–130)
GLUCOSE BLDC GLUCOMTR-MCNC: 138 MG/DL (ref 70–130)
HCT VFR BLD AUTO: 32.2 % (ref 40.4–52.2)
HGB BLD-MCNC: 9.8 G/DL (ref 13.7–17.6)
IMM GRANULOCYTES # BLD: 0.02 10*3/MM3 (ref 0–0.03)
IMM GRANULOCYTES NFR BLD: 0.2 % (ref 0–0.5)
LYMPHOCYTES # BLD AUTO: 1.92 10*3/MM3 (ref 0.9–4.8)
LYMPHOCYTES NFR BLD AUTO: 20.2 % (ref 19.6–45.3)
MCH RBC QN AUTO: 30 PG (ref 27–32.7)
MCHC RBC AUTO-ENTMCNC: 30.4 G/DL (ref 32.6–36.4)
MCV RBC AUTO: 98.5 FL (ref 79.8–96.2)
MONOCYTES # BLD AUTO: 1.1 10*3/MM3 (ref 0.2–1.2)
MONOCYTES NFR BLD AUTO: 11.6 % (ref 5–12)
NEUTROPHILS # BLD AUTO: 5.85 10*3/MM3 (ref 1.9–8.1)
NEUTROPHILS NFR BLD AUTO: 61.4 % (ref 42.7–76)
PLATELET # BLD AUTO: 233 10*3/MM3 (ref 140–500)
PMV BLD AUTO: 10.4 FL (ref 6–12)
RBC # BLD AUTO: 3.27 10*6/MM3 (ref 4.6–6)
WBC NRBC COR # BLD: 9.52 10*3/MM3 (ref 4.5–10.7)

## 2018-04-28 PROCEDURE — 85025 COMPLETE CBC W/AUTO DIFF WBC: CPT | Performed by: INTERNAL MEDICINE

## 2018-04-28 PROCEDURE — 82962 GLUCOSE BLOOD TEST: CPT

## 2018-04-28 PROCEDURE — 25010000002 HEPARIN (PORCINE) PER 1000 UNITS: Performed by: THORACIC SURGERY (CARDIOTHORACIC VASCULAR SURGERY)

## 2018-04-28 PROCEDURE — 94799 UNLISTED PULMONARY SVC/PX: CPT

## 2018-04-28 PROCEDURE — 99232 SBSQ HOSP IP/OBS MODERATE 35: CPT | Performed by: INTERNAL MEDICINE

## 2018-04-28 PROCEDURE — 99024 POSTOP FOLLOW-UP VISIT: CPT | Performed by: THORACIC SURGERY (CARDIOTHORACIC VASCULAR SURGERY)

## 2018-04-28 PROCEDURE — 97110 THERAPEUTIC EXERCISES: CPT

## 2018-04-28 RX ADMIN — METOPROLOL TARTRATE 75 MG: 25 TABLET ORAL at 08:04

## 2018-04-28 RX ADMIN — Medication 10 ML: at 08:04

## 2018-04-28 RX ADMIN — IPRATROPIUM BROMIDE AND ALBUTEROL SULFATE 3 ML: .5; 3 SOLUTION RESPIRATORY (INHALATION) at 11:48

## 2018-04-28 RX ADMIN — FAMOTIDINE 20 MG: 10 INJECTION INTRAVENOUS at 08:04

## 2018-04-28 RX ADMIN — SODIUM CHLORIDE SOLN NEBU 7% 4 ML: 7 NEBU SOLN at 21:23

## 2018-04-28 RX ADMIN — HEPARIN SODIUM 5000 UNITS: 5000 INJECTION, SOLUTION INTRAVENOUS; SUBCUTANEOUS at 13:05

## 2018-04-28 RX ADMIN — HEPARIN SODIUM 5000 UNITS: 5000 INJECTION, SOLUTION INTRAVENOUS; SUBCUTANEOUS at 21:45

## 2018-04-28 RX ADMIN — IPRATROPIUM BROMIDE AND ALBUTEROL SULFATE 3 ML: .5; 3 SOLUTION RESPIRATORY (INHALATION) at 08:14

## 2018-04-28 RX ADMIN — IPRATROPIUM BROMIDE AND ALBUTEROL SULFATE 3 ML: .5; 3 SOLUTION RESPIRATORY (INHALATION) at 21:17

## 2018-04-28 RX ADMIN — FAMOTIDINE 20 MG: 10 INJECTION INTRAVENOUS at 21:43

## 2018-04-28 RX ADMIN — ATORVASTATIN CALCIUM 40 MG: 20 TABLET, FILM COATED ORAL at 08:04

## 2018-04-28 RX ADMIN — SODIUM CHLORIDE SOLN NEBU 7% 4 ML: 7 NEBU SOLN at 08:15

## 2018-04-28 RX ADMIN — FUROSEMIDE 40 MG: 40 TABLET ORAL at 08:04

## 2018-04-28 RX ADMIN — HEPARIN SODIUM 5000 UNITS: 5000 INJECTION, SOLUTION INTRAVENOUS; SUBCUTANEOUS at 06:52

## 2018-04-28 RX ADMIN — IPRATROPIUM BROMIDE AND ALBUTEROL SULFATE 3 ML: .5; 3 SOLUTION RESPIRATORY (INHALATION) at 15:12

## 2018-04-28 RX ADMIN — LOSARTAN POTASSIUM 25 MG: 25 TABLET, FILM COATED ORAL at 08:04

## 2018-04-28 RX ADMIN — METOPROLOL TARTRATE 75 MG: 25 TABLET ORAL at 21:43

## 2018-04-29 ENCOUNTER — APPOINTMENT (OUTPATIENT)
Dept: GENERAL RADIOLOGY | Facility: HOSPITAL | Age: 83
End: 2018-04-29

## 2018-04-29 LAB
ANION GAP SERPL CALCULATED.3IONS-SCNC: 8.3 MMOL/L
BASOPHILS # BLD AUTO: 0.03 10*3/MM3 (ref 0–0.2)
BASOPHILS NFR BLD AUTO: 0.3 % (ref 0–1.5)
BUN BLD-MCNC: 23 MG/DL (ref 8–23)
BUN/CREAT SERPL: 23.2 (ref 7–25)
CALCIUM SPEC-SCNC: 9.1 MG/DL (ref 8.6–10.5)
CHLORIDE SERPL-SCNC: 101 MMOL/L (ref 98–107)
CO2 SERPL-SCNC: 33.7 MMOL/L (ref 22–29)
CREAT BLD-MCNC: 0.99 MG/DL (ref 0.76–1.27)
DEPRECATED RDW RBC AUTO: 54.1 FL (ref 37–54)
EOSINOPHIL # BLD AUTO: 0.8 10*3/MM3 (ref 0–0.7)
EOSINOPHIL NFR BLD AUTO: 7.1 % (ref 0.3–6.2)
ERYTHROCYTE [DISTWIDTH] IN BLOOD BY AUTOMATED COUNT: 15.1 % (ref 11.5–14.5)
GFR SERPL CREATININE-BSD FRML MDRD: 72 ML/MIN/1.73
GLUCOSE BLD-MCNC: 112 MG/DL (ref 65–99)
GLUCOSE BLDC GLUCOMTR-MCNC: 106 MG/DL (ref 70–130)
GLUCOSE BLDC GLUCOMTR-MCNC: 122 MG/DL (ref 70–130)
GLUCOSE BLDC GLUCOMTR-MCNC: 126 MG/DL (ref 70–130)
GLUCOSE BLDC GLUCOMTR-MCNC: 126 MG/DL (ref 70–130)
HCT VFR BLD AUTO: 31.8 % (ref 40.4–52.2)
HGB BLD-MCNC: 9.9 G/DL (ref 13.7–17.6)
IMM GRANULOCYTES # BLD: 0.01 10*3/MM3 (ref 0–0.03)
IMM GRANULOCYTES NFR BLD: 0.1 % (ref 0–0.5)
LYMPHOCYTES # BLD AUTO: 1.9 10*3/MM3 (ref 0.9–4.8)
LYMPHOCYTES NFR BLD AUTO: 16.9 % (ref 19.6–45.3)
MCH RBC QN AUTO: 30.5 PG (ref 27–32.7)
MCHC RBC AUTO-ENTMCNC: 31.1 G/DL (ref 32.6–36.4)
MCV RBC AUTO: 97.8 FL (ref 79.8–96.2)
MONOCYTES # BLD AUTO: 1.24 10*3/MM3 (ref 0.2–1.2)
MONOCYTES NFR BLD AUTO: 11 % (ref 5–12)
NEUTROPHILS # BLD AUTO: 7.29 10*3/MM3 (ref 1.9–8.1)
NEUTROPHILS NFR BLD AUTO: 64.7 % (ref 42.7–76)
PLATELET # BLD AUTO: 247 10*3/MM3 (ref 140–500)
PMV BLD AUTO: 10.8 FL (ref 6–12)
POTASSIUM BLD-SCNC: 4.1 MMOL/L (ref 3.5–5.2)
RBC # BLD AUTO: 3.25 10*6/MM3 (ref 4.6–6)
SODIUM BLD-SCNC: 143 MMOL/L (ref 136–145)
WBC NRBC COR # BLD: 11.26 10*3/MM3 (ref 4.5–10.7)

## 2018-04-29 PROCEDURE — 80048 BASIC METABOLIC PNL TOTAL CA: CPT | Performed by: THORACIC SURGERY (CARDIOTHORACIC VASCULAR SURGERY)

## 2018-04-29 PROCEDURE — 82962 GLUCOSE BLOOD TEST: CPT

## 2018-04-29 PROCEDURE — 25010000002 HEPARIN (PORCINE) PER 1000 UNITS: Performed by: THORACIC SURGERY (CARDIOTHORACIC VASCULAR SURGERY)

## 2018-04-29 PROCEDURE — 94799 UNLISTED PULMONARY SVC/PX: CPT

## 2018-04-29 PROCEDURE — 85025 COMPLETE CBC W/AUTO DIFF WBC: CPT | Performed by: THORACIC SURGERY (CARDIOTHORACIC VASCULAR SURGERY)

## 2018-04-29 PROCEDURE — 99024 POSTOP FOLLOW-UP VISIT: CPT | Performed by: THORACIC SURGERY (CARDIOTHORACIC VASCULAR SURGERY)

## 2018-04-29 PROCEDURE — 71046 X-RAY EXAM CHEST 2 VIEWS: CPT

## 2018-04-29 RX ORDER — IPRATROPIUM BROMIDE AND ALBUTEROL SULFATE 2.5; .5 MG/3ML; MG/3ML
3 SOLUTION RESPIRATORY (INHALATION) EVERY 4 HOURS PRN
Status: DISCONTINUED | OUTPATIENT
Start: 2018-04-29 | End: 2018-05-03 | Stop reason: HOSPADM

## 2018-04-29 RX ORDER — ARFORMOTEROL TARTRATE 15 UG/2ML
15 SOLUTION RESPIRATORY (INHALATION)
Status: DISCONTINUED | OUTPATIENT
Start: 2018-04-29 | End: 2018-05-03 | Stop reason: HOSPADM

## 2018-04-29 RX ADMIN — IPRATROPIUM BROMIDE AND ALBUTEROL SULFATE 3 ML: .5; 3 SOLUTION RESPIRATORY (INHALATION) at 15:39

## 2018-04-29 RX ADMIN — HEPARIN SODIUM 5000 UNITS: 5000 INJECTION, SOLUTION INTRAVENOUS; SUBCUTANEOUS at 21:50

## 2018-04-29 RX ADMIN — FUROSEMIDE 40 MG: 40 TABLET ORAL at 09:06

## 2018-04-29 RX ADMIN — METOPROLOL TARTRATE 75 MG: 25 TABLET ORAL at 21:50

## 2018-04-29 RX ADMIN — Medication 10 ML: at 09:07

## 2018-04-29 RX ADMIN — IPRATROPIUM BROMIDE AND ALBUTEROL SULFATE 3 ML: .5; 3 SOLUTION RESPIRATORY (INHALATION) at 07:43

## 2018-04-29 RX ADMIN — HEPARIN SODIUM 5000 UNITS: 5000 INJECTION, SOLUTION INTRAVENOUS; SUBCUTANEOUS at 06:14

## 2018-04-29 RX ADMIN — ATORVASTATIN CALCIUM 40 MG: 20 TABLET, FILM COATED ORAL at 09:06

## 2018-04-29 RX ADMIN — LOSARTAN POTASSIUM 25 MG: 25 TABLET, FILM COATED ORAL at 09:06

## 2018-04-29 RX ADMIN — FAMOTIDINE 20 MG: 10 INJECTION INTRAVENOUS at 21:50

## 2018-04-29 RX ADMIN — ARFORMOTEROL TARTRATE 15 MCG: 15 SOLUTION RESPIRATORY (INHALATION) at 20:25

## 2018-04-29 RX ADMIN — SODIUM CHLORIDE SOLN NEBU 7% 4 ML: 7 NEBU SOLN at 07:43

## 2018-04-29 RX ADMIN — HEPARIN SODIUM 5000 UNITS: 5000 INJECTION, SOLUTION INTRAVENOUS; SUBCUTANEOUS at 14:00

## 2018-04-29 RX ADMIN — FAMOTIDINE 20 MG: 10 INJECTION INTRAVENOUS at 09:06

## 2018-04-29 RX ADMIN — METOPROLOL TARTRATE 75 MG: 25 TABLET ORAL at 09:06

## 2018-04-29 RX ADMIN — IPRATROPIUM BROMIDE AND ALBUTEROL SULFATE 3 ML: .5; 3 SOLUTION RESPIRATORY (INHALATION) at 11:25

## 2018-04-30 LAB
GLUCOSE BLDC GLUCOMTR-MCNC: 116 MG/DL (ref 70–130)
GLUCOSE BLDC GLUCOMTR-MCNC: 122 MG/DL (ref 70–130)
GLUCOSE BLDC GLUCOMTR-MCNC: 91 MG/DL (ref 70–130)

## 2018-04-30 PROCEDURE — 94799 UNLISTED PULMONARY SVC/PX: CPT

## 2018-04-30 PROCEDURE — 82962 GLUCOSE BLOOD TEST: CPT

## 2018-04-30 PROCEDURE — 99024 POSTOP FOLLOW-UP VISIT: CPT | Performed by: THORACIC SURGERY (CARDIOTHORACIC VASCULAR SURGERY)

## 2018-04-30 PROCEDURE — 97110 THERAPEUTIC EXERCISES: CPT

## 2018-04-30 PROCEDURE — 99232 SBSQ HOSP IP/OBS MODERATE 35: CPT | Performed by: NURSE PRACTITIONER

## 2018-04-30 PROCEDURE — 25010000002 HEPARIN (PORCINE) PER 1000 UNITS: Performed by: THORACIC SURGERY (CARDIOTHORACIC VASCULAR SURGERY)

## 2018-04-30 RX ADMIN — ASPIRIN 81 MG: 81 TABLET ORAL at 09:49

## 2018-04-30 RX ADMIN — METOPROLOL TARTRATE 75 MG: 25 TABLET ORAL at 09:49

## 2018-04-30 RX ADMIN — DOCUSATE SODIUM 100 MG: 100 CAPSULE, LIQUID FILLED ORAL at 09:49

## 2018-04-30 RX ADMIN — LOSARTAN POTASSIUM 25 MG: 25 TABLET, FILM COATED ORAL at 09:49

## 2018-04-30 RX ADMIN — HEPARIN SODIUM 5000 UNITS: 5000 INJECTION, SOLUTION INTRAVENOUS; SUBCUTANEOUS at 05:58

## 2018-04-30 RX ADMIN — FUROSEMIDE 40 MG: 40 TABLET ORAL at 09:50

## 2018-04-30 RX ADMIN — HEPARIN SODIUM 5000 UNITS: 5000 INJECTION, SOLUTION INTRAVENOUS; SUBCUTANEOUS at 15:00

## 2018-04-30 RX ADMIN — ATORVASTATIN CALCIUM 40 MG: 20 TABLET, FILM COATED ORAL at 09:49

## 2018-04-30 RX ADMIN — ARFORMOTEROL TARTRATE 15 MCG: 15 SOLUTION RESPIRATORY (INHALATION) at 07:46

## 2018-04-30 RX ADMIN — ARFORMOTEROL TARTRATE 15 MCG: 15 SOLUTION RESPIRATORY (INHALATION) at 19:59

## 2018-04-30 RX ADMIN — FAMOTIDINE 20 MG: 10 INJECTION INTRAVENOUS at 09:50

## 2018-05-01 LAB
GLUCOSE BLDC GLUCOMTR-MCNC: 91 MG/DL (ref 70–130)
GLUCOSE BLDC GLUCOMTR-MCNC: 98 MG/DL (ref 70–130)
INR PPP: 1.09 (ref 0.9–1.1)
PROTHROMBIN TIME: 13.9 SECONDS (ref 11.7–14.2)

## 2018-05-01 PROCEDURE — 94799 UNLISTED PULMONARY SVC/PX: CPT

## 2018-05-01 PROCEDURE — 25010000002 FUROSEMIDE PER 20 MG: Performed by: THORACIC SURGERY (CARDIOTHORACIC VASCULAR SURGERY)

## 2018-05-01 PROCEDURE — 25010000002 HEPARIN (PORCINE) PER 1000 UNITS: Performed by: THORACIC SURGERY (CARDIOTHORACIC VASCULAR SURGERY)

## 2018-05-01 PROCEDURE — 99024 POSTOP FOLLOW-UP VISIT: CPT | Performed by: THORACIC SURGERY (CARDIOTHORACIC VASCULAR SURGERY)

## 2018-05-01 PROCEDURE — 99232 SBSQ HOSP IP/OBS MODERATE 35: CPT | Performed by: INTERNAL MEDICINE

## 2018-05-01 PROCEDURE — 82962 GLUCOSE BLOOD TEST: CPT

## 2018-05-01 PROCEDURE — 90791 PSYCH DIAGNOSTIC EVALUATION: CPT | Performed by: SOCIAL WORKER

## 2018-05-01 PROCEDURE — 85610 PROTHROMBIN TIME: CPT | Performed by: INTERNAL MEDICINE

## 2018-05-01 RX ORDER — FUROSEMIDE 10 MG/ML
40 INJECTION INTRAMUSCULAR; INTRAVENOUS ONCE
Status: COMPLETED | OUTPATIENT
Start: 2018-05-01 | End: 2018-05-01

## 2018-05-01 RX ORDER — WARFARIN SODIUM 2.5 MG/1
2.5 TABLET ORAL
Status: DISCONTINUED | OUTPATIENT
Start: 2018-05-01 | End: 2018-05-03 | Stop reason: HOSPADM

## 2018-05-01 RX ADMIN — ARFORMOTEROL TARTRATE 15 MCG: 15 SOLUTION RESPIRATORY (INHALATION) at 20:57

## 2018-05-01 RX ADMIN — ARFORMOTEROL TARTRATE 15 MCG: 15 SOLUTION RESPIRATORY (INHALATION) at 08:44

## 2018-05-01 RX ADMIN — HEPARIN SODIUM 5000 UNITS: 5000 INJECTION, SOLUTION INTRAVENOUS; SUBCUTANEOUS at 06:08

## 2018-05-01 RX ADMIN — FAMOTIDINE 20 MG: 10 INJECTION INTRAVENOUS at 20:00

## 2018-05-01 RX ADMIN — FUROSEMIDE 40 MG: 10 INJECTION, SOLUTION INTRAMUSCULAR; INTRAVENOUS at 14:29

## 2018-05-01 RX ADMIN — FAMOTIDINE 20 MG: 10 INJECTION INTRAVENOUS at 09:08

## 2018-05-01 RX ADMIN — HEPARIN SODIUM 5000 UNITS: 5000 INJECTION, SOLUTION INTRAVENOUS; SUBCUTANEOUS at 00:06

## 2018-05-01 RX ADMIN — HEPARIN SODIUM 5000 UNITS: 5000 INJECTION, SOLUTION INTRAVENOUS; SUBCUTANEOUS at 14:29

## 2018-05-01 NOTE — SIGNIFICANT NOTE
"   05/01/18 0845   Rehab Time/Intention   Evaluation Not Performed other (see comments)  (SLP attempted to repeat FEES this date. Pt refused multiple attempts, covered face with blanket and turned head away from SLP. SLP explained results from previous FEES 4/25/18-demonstrating silent aspiration secretions, ice chips, and honey thick liquids. SLP explained risks of aspiration PNA and possible death with recurrent aspiration. Pt stated \"let me eat and let's see what happens? I don't want to live anymore.\" MD please advise.)   Rehab Treatment   Discipline speech language pathologist     "

## 2018-05-01 NOTE — SIGNIFICANT NOTE
05/01/18 1141   Rehab Treatment   Discipline physical therapy assistant   Reason Treatment Not Performed unavailable for treatment  (Pt currently meeting w/ access. Will check back in pm.)   Recommendation   PT - Next Appointment 05/01/18

## 2018-05-01 NOTE — PLAN OF CARE
Problem: Patient Care Overview  Goal: Plan of Care Review  Outcome: Ongoing (interventions implemented as appropriate)   04/30/18 1800   Coping/Psychosocial   Plan of Care Reviewed With patient   Plan of Care Review   Progress improving   OTHER   Outcome Summary continue PT, falls protocol, turn q 2hrs, continue to monitor vitals, I/O      Goal: Individualization and Mutuality  Outcome: Ongoing (interventions implemented as appropriate)   04/30/18 1800   Individualization   Patient Specific Preferences prefers to be called '' Dejon''    Patient Specific Goals (Include Timeframe) pain controlled, no falls, nutrition improved, rehab placement    Patient Specific Interventions monitor vitals, 02 sats, falls protocol, bed alarm, turn q 2hrs, tube feedings    Mutuality/Individual Preferences   What Anxieties, Fears, Concerns, or Questions Do You Have About Your Care? nutrition impaired, falls risk, confusion, gen weakness    What Information Would Help Us Give You More Personalized Care? update patient/spouse on new meds, new orders, possible d/c date    How Would You and/or Your Support Person Like to Participate in Your Care? update daily on plan of care, call spouse if any changes made    Mutuality/Individual Preferences   How to Address Anxieties/Fears update daily plan of care with patient/spouse, discuss needs with staff      Goal: Discharge Needs Assessment  Outcome: Ongoing (interventions implemented as appropriate)   04/30/18 1800   Discharge Needs Assessment   Readmission Within the Last 30 Days no previous admission in last 30 days   Concerns to be Addressed discharge planning   Patient/Family Anticipates Transition to inpatient rehabilitation facility   Patient/Family Anticipated Services at Transition rehabilitation services   Transportation Concerns car, none   Transportation Anticipated family or friend will provide   Anticipated Changes Related to Illness none   Equipment Needed After Discharge none    Outpatient/Agency/Support Group Needs inpatient rehabilitation facility   Discharge Facility/Level of Care Needs rehabilitation facility   Patient's Choice of Community Agency(s) spouse requesting patient go to [facility] after discharge    Current Discharge Risk dependent with mobility/activities of daily living   Discharge Coordination/Progress plans on going to rehab facility after discharge from hospital    Disability   Equipment Currently Used at Home cane, straight       Problem: Fall Risk (Adult)  Goal: Identify Related Risk Factors and Signs and Symptoms  Outcome: Ongoing (interventions implemented as appropriate)   04/30/18 1800   Fall Risk (Adult)   Related Risk Factors (Fall Risk) age-related changes;bladder function altered;confusion/agitation;fatigue/slow reaction;gait/mobility problems;history of falls;slippery/uneven surfaces;environment unfamiliar;sleep pattern alteration   Signs and Symptoms (Fall Risk) presence of risk factors     Goal: Absence of Fall  Outcome: Ongoing (interventions implemented as appropriate)   04/30/18 1800   Fall Risk (Adult)   Absence of Fall making progress toward outcome       Problem: Skin Injury Risk (Adult)  Goal: Identify Related Risk Factors and Signs and Symptoms  Outcome: Ongoing (interventions implemented as appropriate)   04/30/18 1800   Skin Injury Risk (Adult)   Related Risk Factors (Skin Injury Risk) advanced age;cognitive impairment;fluid intake inadequate;medication;mobility impaired;moisture;nutritional deficiencies     Goal: Skin Health and Integrity  Outcome: Ongoing (interventions implemented as appropriate)   04/30/18 1800   Skin Injury Risk (Adult)   Skin Health and Integrity making progress toward outcome       Problem: Pain, Acute (Adult)  Goal: Identify Related Risk Factors and Signs and Symptoms  Outcome: Ongoing (interventions implemented as appropriate)   04/30/18 1800   Pain, Acute (Adult)   Related Risk Factors (Acute Pain) disease  process;knowledge deficit;positioning;procedure/treatment   Signs and Symptoms (Acute Pain) fatigue/weakness;guarding/abnormal posturing/positioning;impaired thought process/concentration     Goal: Acceptable Pain Control/Comfort Level  Outcome: Ongoing (interventions implemented as appropriate)   04/30/18 1800   Pain, Acute (Adult)   Acceptable Pain Control/Comfort Level making progress toward outcome       Problem: Nutrition, Enteral (Adult)  Goal: Signs and Symptoms of Listed Potential Problems Will be Absent, Minimized or Managed (Nutrition, Enteral)  Outcome: Ongoing (interventions implemented as appropriate)   04/30/18 1800   Goal/Outcome Evaluation   Problems Assessed (Enteral Nutrition) all   Problems Present (Enteral Nutrition) none

## 2018-05-01 NOTE — SIGNIFICANT NOTE
05/01/18 1545   Rehab Treatment   Discipline physical therapy assistant   Reason Treatment Not Performed patient/family declined treatment  (Pt refused PT this pm. Per wife, was up in chair for awhile and is now sleeping. Will follow-up tomorrow. Notified DEWEY Berrios. )   Recommendation   PT - Next Appointment 05/02/18

## 2018-05-01 NOTE — NURSING NOTE
Pt refused another Cortrak. Pt states he will proceed with FEES now. FEES cannot be completed until tomorrow. Spoke with Genny Alva to let her know to come tomorrow afternoon instead of morning. Patient is more cooperative in afternoons. OK to hold cardiac medications today. Lasix changed to iv x 1.  Spoke with Dr Serrano and he is aware.

## 2018-05-01 NOTE — NURSING NOTE
"Spoke with Dr Serrano about pt refusing FEES. Pt stated \"Why do the test so that I can live one more day. Quit being so pushy.\" Order to place Cotrak today and try to do FEES again tomorrow.   "

## 2018-05-01 NOTE — NURSING NOTE
Spoke with Izzy with [facility].  Ok to hold coumadin today until FEES is performed tomorrow.

## 2018-05-01 NOTE — PLAN OF CARE
Problem: Patient Care Overview  Goal: Plan of Care Review  Outcome: Ongoing (interventions implemented as appropriate)   05/01/18 0625   Coping/Psychosocial   Plan of Care Reviewed With patient   Plan of Care Review   Progress no change   OTHER   Outcome Summary falls protocol, turn q 2hrs, continue to monitor vitals, I/O, cont PT. Pt confused this morning.     Goal: Individualization and Mutuality  Outcome: Ongoing (interventions implemented as appropriate)    Goal: Discharge Needs Assessment  Outcome: Ongoing (interventions implemented as appropriate)    Goal: Interprofessional Rounds/Family Conf  Outcome: Ongoing (interventions implemented as appropriate)

## 2018-05-01 NOTE — PLAN OF CARE
Problem: Patient Care Overview  Goal: Plan of Care Review  Outcome: Ongoing (interventions implemented as appropriate)   05/01/18 4024   Coping/Psychosocial   Plan of Care Reviewed With patient   Plan of Care Review   Progress no change   OTHER   Outcome Summary denies pain, NPO, refused cortrak, refused FEES, family at bedside, monitoring vitals, pt stable will continue to monitor.      Goal: Individualization and Mutuality  Outcome: Ongoing (interventions implemented as appropriate)      Problem: Fall Risk (Adult)  Goal: Identify Related Risk Factors and Signs and Symptoms  Outcome: Ongoing (interventions implemented as appropriate)    Goal: Absence of Fall  Outcome: Ongoing (interventions implemented as appropriate)      Problem: Skin Injury Risk (Adult)  Goal: Identify Related Risk Factors and Signs and Symptoms  Outcome: Ongoing (interventions implemented as appropriate)    Goal: Skin Health and Integrity  Outcome: Ongoing (interventions implemented as appropriate)      Problem: Pain, Acute (Adult)  Goal: Identify Related Risk Factors and Signs and Symptoms  Outcome: Ongoing (interventions implemented as appropriate)    Goal: Acceptable Pain Control/Comfort Level  Outcome: Ongoing (interventions implemented as appropriate)

## 2018-05-01 NOTE — PROGRESS NOTES
[facility]  Progress note: 2018    Patient Identification:  Name:Harry Ventura  Age:87 y.o.  Sex: male  :  1930  MRN: [id number]           CC:  follow up CHF, AF    Interval history:  remains weak and sleepy this morning but arousable.  No chest pain.    Vital Signs:   Temp:  [97.6 °F (36.4 °C)-98.2 °F (36.8 °C)] 97.9 °F (36.6 °C)  Heart Rate:  [61-71] 69  Resp:  [18-20] 18  BP: (113-141)/(50-60) 141/50    Intake/Output Summary (Last 24 hours) at 18 06  Last data filed at 18 0348   Gross per 24 hour   Intake              750 ml   Output              225 ml   Net              525 ml       Physical Examination:    General Appearance No acute distress   Neck No adenopathy, supple, trachea midline, no thyromegaly, no carotid bruit, no JVD   Lungs Clear to auscultation,respirations regular, even and unlabored   Heart Irregular rhythm and normal rate, normal S1 and S2, no murmur, no gallop, no rub, no click   Chest wall No abnormalities observed   Abdomen Normal bowel sounds, no masses, no hepatomegaly, soft   Extremities Moves all extremities well, no edema, no cyanosis, no redness   Neurological Alert and oriented x 3     Lab Review:  Personally reviewed the labs, radiology imaging and other cardiac procedures.   Results from last 7 days  Lab Units 18  0549  18  0650   SODIUM mmol/L 143  < > 143   POTASSIUM mmol/L 4.1  < > 3.7   CHLORIDE mmol/L 101  < > 101   CO2 mmol/L 33.7*  < > 30.9*   BUN mg/dL 23  < > 12   CREATININE mg/dL 0.99  < > 0.87   CALCIUM mg/dL 9.1  < > 9.1   BILIRUBIN mg/dL  --   --  0.6   ALK PHOS U/L  --   --  93   ALT (SGPT) U/L  --   --  8   AST (SGOT) U/L  --   --  23   GLUCOSE mg/dL 112*  < > 108*   < > = values in this interval not displayed.      )  Results from last 7 days  Lab Units 18  0549 18  0351 18  0311   WBC 10*3/mm3 11.26* 9.52 12.84*   HEMOGLOBIN g/dL 9.9* 9.8* 10.5*   HEMATOCRIT % 31.8* 32.2* 34.2*   PLATELETS  10*3/mm3 247 233 254           Medication Review:   Meds reviewed  Scheduled Meds:  arformoterol 15 mcg Nebulization BID - RT   aspirin 81 mg Oral Daily   atorvastatin 40 mg Oral Daily   docusate sodium 100 mg Oral BID   famotidine 20 mg Intravenous Q12H   furosemide 40 mg Oral Daily   heparin (porcine) 5,000 Units Subcutaneous Q8H   losartan 25 mg Oral Q24H   metoprolol tartrate 75 mg Oral Q12H   polyethylene glycol 17 g Oral Daily   potassium chloride 40 mEq Oral Once   sennosides-docusate sodium 2 tablet Oral Nightly     I personally viewed and interpreted the patient's EKG/Telemetry data    Assessment and Plan  1. Recurrent right pleural effusion s/p VATS - chest tubes out  2. Acute on chronic diastolic heart failure -stable on oral diuretics. EF 63% in 06/2017.   3. Persistent atrial fibrillation - Start warfarin  4. SSS s/p PPM  5. Pulm HTN - RSVP  49 mmHg 06/2017  6. Questionable wide complex arrhythmia and ST T wave changes.  no recurrence   7. HTN - better with the addition of losartan. Continue current regimen.   8. Post op confusion  9. Dysphagia - he is on tube feeding  10. Mild MR    Nidhi Juarez  5/1/20186:21 AM  25min spent in reviewing records, discussion and examination of the patient and discussion with other members of the patient's medical team.     Dictated utilizing Dragon dictation

## 2018-05-01 NOTE — CONSULTS
[facility] Assessment:  Pt is an 88 yo white  male who looks pretty good for his age.  Pt came to the hospital on 4/20/18 for pleural effusion right side VAT.  He had some confusion post surgery and ended up with ETOH withdrawals which now seem resolved.  Access was called when pt expressed yesterday that he wanted to die.  Pt is A&Ox4.  Pt found in room with his son, Vidal.  Met with pt 1:1 and met with Vidal separately.  Pt was pleasant and cooperative with interview and appeared cognitively intact.    Pt denies any current SI/HI.  He states he does not want to die.  He did not want to have another scope done and was in a bad mood when he made those statements yesterday.  Pt states he has agreed to have the test this afternoon.  He hopes to return home to his wife post hospitalization.  Pt admits to daily etoh use of 3-5 vodka and tonics a night.  He is aware that he went through etoh withdrawals.  He is somewhat concerned about his delusional thoughts that he now knows were not real.  Educated about etoh use and how it would be better for his health if he does not return to etoh or at least significantly reduce his use.  Pt seems to understand.  He states his wife drinks more than he does but doesn't feel that her drinking is a problem for him to quit.  Pt denies any history of depression or mental health issues.  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      Pt's son Vidal validated the information the pt provided.  He thinks if family stays with him and keeps him focused on the treatment that the pt will do better.    No referrals or other intervention from Access needed, however we will follow and provide support.

## 2018-05-01 NOTE — PROGRESS NOTES
"    Chief Complaint: Recurrent right pleural effusion, postoperative management  S/P: Right VAT with decortication of right lung and pleurodesis  POD # 11    Subjective     Patient became confused again last night.  Was given a dose of Haldol and rested through the night.  He has pulled out his core track.  He refuses to have it replaced.  He refuses study.  States that he wants to die.  Seems very depressed today    Vital Signs:  Temp:  [97.6 °F (36.4 °C)-98.2 °F (36.8 °C)] 98 °F (36.7 °C)  Heart Rate:  [61-73] 72  Resp:  [18-20] 18  BP: (114-141)/(50-68) 115/68    Intake & Output (last day)       04/30 0701 - 05/01 0700 05/01 0701 - 05/02 0700    P.O. 0     Other 250     NG/     Total Intake(mL/kg) 750 (11.6)     Urine (mL/kg/hr) 225 (0.1)     Stool 0 (0)     Total Output 225      Net +525            Unmeasured Urine Occurrence 5 x     Unmeasured Stool Occurrence 1 x           Objective:  General Appearance:  Comfortable and in no acute distress.    Vital signs: (most recent): Blood pressure 115/68, pulse 72, temperature 98 °F (36.7 °C), temperature source Oral, resp. rate 18, height 170.2 cm (67\"), weight 64.5 kg (142 lb 1.6 oz), SpO2 97 %.  No fever.    Output: Producing urine.    Lungs:  Normal effort and normal respiratory rate.  There are decreased breath sounds.    Heart: Normal rate.  Irregular rhythm.  No murmur.   Abdomen: Abdomen is soft.  Bowel sounds are normal.   There is no abdominal tenderness.   There is no mass.   Extremities: There is no dependent edema.    Neurological: Patient is alert.  Normal strength.              Results Review:     I reviewed the patient's new clinical results.  I reviewed the patient's new imaging results and agree with the interpretation.    Imaging Results (last 24 hours)     ** No results found for the last 24 hours. **          Lab Results:     Lab Results (last 24 hours)     Procedure Component Value Units Date/Time    Protime-INR [[id number]]  (Normal) " Collected:  05/01/18 0839    Specimen:  Blood Updated:  05/01/18 0904     Protime 13.9 Seconds      INR 1.09    POC Glucose Once [[id number]]  (Normal) Collected:  04/30/18 1622    Specimen:  Blood Updated:  04/30/18 1623     Glucose 91 mg/dL     Narrative:       Meter: [id number] : [id number] Neris Irina NA           Assessment/Plan     Principal Problem:    Pleural effusion, right  Active Problems:    Atrial fibrillation    Chronic anticoagulation    Essential hypertension    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  [facility] to see the patient.    Erich Serrano III, MD  [facility]  05/01/18  9:49 AM

## 2018-05-02 LAB
ALBUMIN SERPL-MCNC: 3.2 G/DL (ref 3.5–5.2)
ALBUMIN/GLOB SERPL: 0.9 G/DL
ALP SERPL-CCNC: 153 U/L (ref 39–117)
ALT SERPL W P-5'-P-CCNC: 47 U/L (ref 1–41)
ANION GAP SERPL CALCULATED.3IONS-SCNC: 13.9 MMOL/L
AST SERPL-CCNC: 58 U/L (ref 1–40)
BASOPHILS # BLD AUTO: 0.04 10*3/MM3 (ref 0–0.2)
BASOPHILS NFR BLD AUTO: 0.4 % (ref 0–1.5)
BILIRUB SERPL-MCNC: 1.2 MG/DL (ref 0.1–1.2)
BUN BLD-MCNC: 23 MG/DL (ref 8–23)
BUN/CREAT SERPL: 21.3 (ref 7–25)
CALCIUM SPEC-SCNC: 9.5 MG/DL (ref 8.6–10.5)
CHLORIDE SERPL-SCNC: 98 MMOL/L (ref 98–107)
CO2 SERPL-SCNC: 31.1 MMOL/L (ref 22–29)
CREAT BLD-MCNC: 1.08 MG/DL (ref 0.76–1.27)
DEPRECATED RDW RBC AUTO: 53.2 FL (ref 37–54)
EOSINOPHIL # BLD AUTO: 0.47 10*3/MM3 (ref 0–0.7)
EOSINOPHIL NFR BLD AUTO: 4.5 % (ref 0.3–6.2)
ERYTHROCYTE [DISTWIDTH] IN BLOOD BY AUTOMATED COUNT: 14.8 % (ref 11.5–14.5)
GFR SERPL CREATININE-BSD FRML MDRD: 65 ML/MIN/1.73
GLOBULIN UR ELPH-MCNC: 3.6 GM/DL
GLUCOSE BLD-MCNC: 99 MG/DL (ref 65–99)
HCT VFR BLD AUTO: 37 % (ref 40.4–52.2)
HGB BLD-MCNC: 11.8 G/DL (ref 13.7–17.6)
IMM GRANULOCYTES # BLD: 0.03 10*3/MM3 (ref 0–0.03)
IMM GRANULOCYTES NFR BLD: 0.3 % (ref 0–0.5)
INR PPP: 1.05 (ref 0.9–1.1)
LYMPHOCYTES # BLD AUTO: 2.07 10*3/MM3 (ref 0.9–4.8)
LYMPHOCYTES NFR BLD AUTO: 19.8 % (ref 19.6–45.3)
MCH RBC QN AUTO: 31.2 PG (ref 27–32.7)
MCHC RBC AUTO-ENTMCNC: 31.9 G/DL (ref 32.6–36.4)
MCV RBC AUTO: 97.9 FL (ref 79.8–96.2)
MONOCYTES # BLD AUTO: 1.02 10*3/MM3 (ref 0.2–1.2)
MONOCYTES NFR BLD AUTO: 9.7 % (ref 5–12)
NEUTROPHILS # BLD AUTO: 6.88 10*3/MM3 (ref 1.9–8.1)
NEUTROPHILS NFR BLD AUTO: 65.6 % (ref 42.7–76)
PLATELET # BLD AUTO: 299 10*3/MM3 (ref 140–500)
PMV BLD AUTO: 10.6 FL (ref 6–12)
POTASSIUM BLD-SCNC: 4.8 MMOL/L (ref 3.5–5.2)
PROT SERPL-MCNC: 6.8 G/DL (ref 6–8.5)
PROTHROMBIN TIME: 13.6 SECONDS (ref 11.7–14.2)
RBC # BLD AUTO: 3.78 10*6/MM3 (ref 4.6–6)
SODIUM BLD-SCNC: 143 MMOL/L (ref 136–145)
WBC NRBC COR # BLD: 10.48 10*3/MM3 (ref 4.5–10.7)

## 2018-05-02 PROCEDURE — 99232 SBSQ HOSP IP/OBS MODERATE 35: CPT | Performed by: INTERNAL MEDICINE

## 2018-05-02 PROCEDURE — 94799 UNLISTED PULMONARY SVC/PX: CPT

## 2018-05-02 PROCEDURE — 97110 THERAPEUTIC EXERCISES: CPT | Performed by: OCCUPATIONAL THERAPIST

## 2018-05-02 PROCEDURE — 25010000002 HEPARIN (PORCINE) PER 1000 UNITS: Performed by: THORACIC SURGERY (CARDIOTHORACIC VASCULAR SURGERY)

## 2018-05-02 PROCEDURE — 92612 ENDOSCOPY SWALLOW (FEES) VID: CPT

## 2018-05-02 PROCEDURE — 97110 THERAPEUTIC EXERCISES: CPT

## 2018-05-02 PROCEDURE — 80053 COMPREHEN METABOLIC PANEL: CPT | Performed by: THORACIC SURGERY (CARDIOTHORACIC VASCULAR SURGERY)

## 2018-05-02 PROCEDURE — 85025 COMPLETE CBC W/AUTO DIFF WBC: CPT | Performed by: THORACIC SURGERY (CARDIOTHORACIC VASCULAR SURGERY)

## 2018-05-02 PROCEDURE — 99024 POSTOP FOLLOW-UP VISIT: CPT | Performed by: THORACIC SURGERY (CARDIOTHORACIC VASCULAR SURGERY)

## 2018-05-02 PROCEDURE — 85610 PROTHROMBIN TIME: CPT | Performed by: INTERNAL MEDICINE

## 2018-05-02 RX ORDER — FAMOTIDINE 20 MG/1
20 TABLET, FILM COATED ORAL 2 TIMES DAILY
Status: DISCONTINUED | OUTPATIENT
Start: 2018-05-02 | End: 2018-05-03 | Stop reason: HOSPADM

## 2018-05-02 RX ADMIN — HEPARIN SODIUM 5000 UNITS: 5000 INJECTION, SOLUTION INTRAVENOUS; SUBCUTANEOUS at 06:13

## 2018-05-02 RX ADMIN — HEPARIN SODIUM 5000 UNITS: 5000 INJECTION, SOLUTION INTRAVENOUS; SUBCUTANEOUS at 22:00

## 2018-05-02 RX ADMIN — FAMOTIDINE 20 MG: 10 INJECTION INTRAVENOUS at 08:51

## 2018-05-02 RX ADMIN — DOCUSATE SODIUM -SENNOSIDES 2 TABLET: 50; 8.6 TABLET, COATED ORAL at 20:29

## 2018-05-02 RX ADMIN — HEPARIN SODIUM 5000 UNITS: 5000 INJECTION, SOLUTION INTRAVENOUS; SUBCUTANEOUS at 16:36

## 2018-05-02 RX ADMIN — HEPARIN SODIUM 5000 UNITS: 5000 INJECTION, SOLUTION INTRAVENOUS; SUBCUTANEOUS at 00:56

## 2018-05-02 RX ADMIN — ARFORMOTEROL TARTRATE 15 MCG: 15 SOLUTION RESPIRATORY (INHALATION) at 19:47

## 2018-05-02 RX ADMIN — WARFARIN SODIUM 2.5 MG: 2.5 TABLET ORAL at 16:37

## 2018-05-02 RX ADMIN — ARFORMOTEROL TARTRATE 15 MCG: 15 SOLUTION RESPIRATORY (INHALATION) at 08:23

## 2018-05-02 RX ADMIN — METOPROLOL TARTRATE 75 MG: 25 TABLET ORAL at 20:28

## 2018-05-02 NOTE — PLAN OF CARE
Problem: Patient Care Overview  Goal: Plan of Care Review  Outcome: Ongoing (interventions implemented as appropriate)   05/02/18 0405   Coping/Psychosocial   Plan of Care Reviewed With patient   Plan of Care Review   Progress improving   OTHER   Outcome Summary denies pain, NPO, VS stable. plan for FEES at 1300 today. will continue to monitor.     Goal: Individualization and Mutuality  Outcome: Ongoing (interventions implemented as appropriate)    Goal: Discharge Needs Assessment  Outcome: Ongoing (interventions implemented as appropriate)    Goal: Interprofessional Rounds/Family Conf  Outcome: Ongoing (interventions implemented as appropriate)

## 2018-05-02 NOTE — PLAN OF CARE
Problem: Patient Care Overview  Goal: Plan of Care Review  Outcome: Ongoing (interventions implemented as appropriate)   05/02/18 9606   Coping/Psychosocial   Plan of Care Reviewed With patient   Plan of Care Review   Progress improving   OTHER   Outcome Summary pt showing slow steady progress with mobility, will cont to benefit from PT to advance with strengthening and progression of activity

## 2018-05-02 NOTE — THERAPY TREATMENT NOTE
Acute Care - Occupational Therapy Treatment Note   [facility]     Patient Name: Harry Ventura  : 1930  MRN: [id number]  Today's Date: 2018     Date of Referral to OT: 18       Admit Date: 2018       ICD-10-CM ICD-9-CM   1. Weakness generalized R53.1 780.79   2. Pleural effusion J90 511.9   3. 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   • Anemia of chronic renal failure, stage 3 (moderate)   • Thrombocytopenia   • Vitamin B12 deficiency   • Coronary artery disease involving native coronary artery of native heart without angina pectoris   • TIA (transient ischemic attack)   • Pacemaker   • Pulmonary hypertension   • Congestive heart failure   • Viral infection   • Pleural effusion, right   • Chronic obstructive pulmonary disease (COPD)   • Pleural effusion   • Confusion, postoperative     Past Medical History:   Diagnosis Date   • Accelerated essential hypertension    • Atrial fibrillation    • CHF (congestive heart failure)    • Chronic anticoagulation    • Chronic diastolic CHF (congestive heart failure)    • CLL (chronic lymphocytic leukemia)     Stage 0   • Coronary artery disease    • ED (erectile dysfunction) of non-organic origin    • Gait instability    • GERD (gastroesophageal reflux disease)    • Glucose intolerance (malabsorption)    • Hypercholesterolemia    • Hypertension     Pulmonary   • Hypogonadism male    • Low back pain    • Pacemaker    •  Personal history of CLL (chronic lymphocytic leukemia)    • Personal history of venous thrombosis and embolism    • Pleural effusion    • TIA (transient ischemic attack) 2017     Past Surgical History:   Procedure Laterality Date   • CARDIAC CATHETERIZATION N/A 6/17/2016    Procedure: Right and Left Heart Cath;  Surgeon: Harry Bolton MD;  Location: [facility] CATH INVASIVE LOCATION;  Service:    • CARDIAC CATHETERIZATION N/A 6/17/2016    Procedure: Coronary angiography;  Surgeon: Harry Bolton MD;  Location: [facility] CATH INVASIVE LOCATION;  Service:    • CARDIAC CATHETERIZATION N/A 6/17/2016    Procedure: Left Heart Cath;  Surgeon: Harry Bolton MD;  Location: [facility] CATH INVASIVE LOCATION;  Service:    • CARDIAC CATHETERIZATION N/A 6/17/2016    Procedure: Left ventriculography;  Surgeon: Harry Bolton MD;  Location: [facility] CATH INVASIVE LOCATION;  Service:    • CHOLECYSTECTOMY  1983   • PACEMAKER IMPLANTATION     • THORACOSCOPY Right 4/20/2018    Procedure: BRONCHOSCOPY, RIGHT VIDEO ASSISTED THORACOSCOPY, RIGHT LUNG DECORTICATION WITH PLEURAL BIOPSY, PLEURODESIS;  Surgeon: Erich Serrano III, MD;  Location: [facility];  Service: Cardiothoracic   • UMBILICAL HERNIA REPAIR  2006    Performed laparoscopically by Dr. Garcia.       Therapy Treatment          Rehabilitation Treatment Summary     Row Name 05/02/18 1457 05/02/18 1400          Treatment Time/Intention    Discipline occupational therapist  - physical therapist  -PC     Document Type therapy note (daily note)  - therapy note (daily note)  -PC     Subjective Information no complaints  - no complaints  -PC     Mode of Treatment individual therapy  - physical therapy  -PC     Patient/Family Observations  -- pt is in bed, no acute distress  -PC     Care Plan Review care plan/treatment goals reviewed  - patient/other agree to care plan  -PC     Care Plan Review, Other Participant(s) family  -  --     Therapy Frequency (PT Clinical Impression)  -- daily  -PC      Patient Effort good  -KP good  -PC     Existing Precautions/Restrictions fall  -KP  --     Recorded by [KP] Nancy Ng, OTR 05/02/18 1544 05/02/18 1544 [PC] Allyn Decker, PT 05/02/18 1407 05/02/18 1407     Row Name 05/02/18 1457 05/02/18 1400          Cognitive Assessment/Intervention- PT/OT    Affect/Mental Status (Cognitive) WNL  -KP WNL  -PC     Orientation Status (Cognition) oriented x 4  -KP  --     Follows Commands (Cognition) WNL  -KP WNL  -PC     Safety Deficit (Cognitive) mild deficit  -KP  --     Personal Safety Interventions fall prevention program maintained;muscle strengthening facilitated;nonskid shoes/slippers when out of bed  -KP fall prevention program maintained;gait belt;muscle strengthening facilitated  -PC     Recorded by [KP] Nancy Ng, OTR 05/02/18 1544 05/02/18 1544 [PC] Allyn Decker, PT 05/02/18 1407 05/02/18 1407     Row Name 05/02/18 1400             Bed Mobility Assessment/Treatment    Supine-Sit Trimble (Bed Mobility) minimum assist (75% patient effort)  -PC      Sit-Supine Trimble (Bed Mobility) minimum assist (75% patient effort)  -PC      Recorded by [PC] Allyn Decker, PT 05/02/18 1407 05/02/18 1407      Row Name 05/02/18 1400             Transfer Assessment/Treatment    Sit-Stand Trimble (Transfers) contact guard  -PC      Stand-Sit Trim[place] (Transfers) contact guard  -PC      Recorded by [PC] Allyn Decker, PT 05/02/18 1407 05/02/18 1407      Row Name 05/02/18 1400             Sit-Stand Transfer    Assistive Device (Sit-Stand Transfers) walker, front-wheeled  -PC      Recorded by [PC] Allyn Decker, PT 05/02/18 1407 05/02/18 1407      Row Name 05/02/18 1400             Stand-Sit Transfer    Assistive Device (Stand-Sit Transfers) walker, front-wheeled  -PC      Recorded by [PC] Allyn Decker, PT 05/02/18 1407 05/02/18 1407      Row Name 05/02/18 1400             Gait/Stairs Assessment/Training    Trimble Level (Gait) contact  guard  -PC      Assistive Device (Gait) walker, front-wheeled  -PC      Distance in Feet (Gait) 80 ft  -PC      Pattern (Gait) step-through  -PC      Deviations/Abnormal Patterns (Gait) jocelyn decreased;stride length decreased  -PC      Bilateral Gait Deviations forward flexed posture  -PC      Comment (Gait/Stairs) worked on gait quality with upright posture, inc step length  -PC      Recorded by [PC] Allyn Decker, PT 05/02/18 1407 05/02/18 1407      Row Name 05/02/18 1457             Bathing Assessment/Intervention    Comment (Bathing) states he is doing well w. bathing and dressing  -KP      Recorded by [KP] Nancy Ng, OTR 05/02/18 1544 05/02/18 1544      Row Name 05/02/18 1458             Motor Skills Assessment/Interventions    Additional Documentation Fine Motor Testing & Training (Group)  -KP      Recorded by [KP] Nancy Ng, OTR 05/02/18 1547 05/02/18 1547      Row Name 05/02/18 1457 05/02/18 1400          Therapeutic Exercise    Upper Extremity (Therapeutic Exercise) bicep curl, left;bicep curl, right  -KP  --     Upper Extremity Range of Motion (Therapeutic Exercise) shoulder flexion/extension, left;shoulder flexion/extension, right;shoulder horizontal abduction/adduction, left;shoulder horizontal abduction/adduction, right;elbow flexion/extension, bilateral  -KP  --     Weight/Resistance (Therapeutic Exercise) yellow  -KP  --     Position (Therapeutic Exercise) supine  -KP  --     Sets/Reps (Therapeutic Exercise) 10 reps 3 sets  -KP  --     Equipment (Therapeutic Exercise) resistive bands  -KP  --     Expected Outcome (Therapeutic Exercise) improve performance, BADLs;improve performance, transfer skills  -KP  --     Comment (Therapeutic Exercise)  -- B AP, LAQ, shoulder flexion, shoulder rolls  -PC     Recorded by [KP] Nancy Ng, OTR 05/02/18 1544 05/02/18 1544 [PC] Allyn Decker, PT 05/02/18 1407 05/02/18 1407     Row Name 05/02/18 1455             Fine Motor  Testing & Training    Comment, Fine Motor Coordination opens container for thickened drink w. SBA . neeed A for the second one  -KP      Recorded by [KP] Nancy Ng, OTR 05/02/18 1547 05/02/18 1547      Row Name 05/02/18 1457 05/02/18 1400          Positioning and Restraints    Pre-Treatment Position in bed  -KP in bed  -PC     Post Treatment Position bed  -KP chair  -PC     In Bed supine;call light within reach;encouraged to call for assist;with family/caregiver  -KP  --     In Chair  -- sitting;call light within reach;encouraged to call for assist;exit alarm on;with family/caregiver  -PC     Recorded by [KP] Nancy Ng, OTR 05/02/18 1544 05/02/18 1544 [PC] Allyn Decker, PT 05/02/18 1407 05/02/18 1407     Row Name 05/02/18 1457             Pain Scale: Numbers Pre/Post-Treatment    Pain Scale: Numbers, Pretreatment 0/10 - no pain  -KP      Pain Scale: Numbers, Post-Treatment 0/10 - no pain  -KP      Recorded by [KP] Nancy Ng, OTR 05/02/18 1544 05/02/18 1544      Row Name                Wound 04/20/18 0839 Right upper;other (see comments) chest incision    Wound - Properties Group Date first assessed: 04/20/18 [SOPHY] Time first assessed: 0839 [SOPHY] Side: Right [SOPHY] Orientation: upper;other (see comments) [JR], midaxillary  Location: chest [SOPHY] Type: incision [SOPHY] Additional Comments: s/p chest tube site. [JR] Recorded by:  [SOPHY] Jordyn Culver RN 04/20/18 0839 04/20/18 0839 [JR] Jose Manuel Grant RN 04/26/18 2139 04/26/18 2139    Row Name                Wound 04/26/18 2036 Right lower;other (see comments) chest incision    Wound - Properties Group Date first assessed: 04/26/18 [JR] Time first assessed: 2036 [JR] Side: Right [JR] Orientation: lower;other (see comments) [JR], midaxillary  Location: chest [JR] Type: incision [JR] Additional Comments: s/p chest tube site [JR] Recorded by:  [JR] Jose Manuel Grant RN 04/26/18 2141 04/26/18 2141    Row Name 05/02/18 1457             Plan of Care Review     Plan of Care Reviewed With patient  -KP      Recorded by [KP] Nancy Ng, OTR 05/02/18 1544 05/02/18 1544      Row Name 05/02/18 1457             Outcome Summary/Treatment Plan (OT)    Daily Summary of Progress (OT) progress toward functional goals is good  -KP      Recorded by [KP] Nancy Ng, OTR 05/02/18 1544 05/02/18 1544      Row Name 05/02/18 1400             Outcome Summary/Treatment Plan (PT)    Anticipated Discharge Disposition (PT) inpatient rehab facility  -PC      Recorded by [PC] Allyn Decker, PT 05/02/18 1409 05/02/18 1409        User Key  (r) = Recorded By, (t) = Taken By, (c) = Cosigned By    Initials Name Effective Dates Discipline     Nancy Ng, OTR 04/13/15 -  OT    PC Allyn Decker, PT 04/03/18 -  PT    SOPHYERICA Culver RN 02/18/16 -  Nurse    JR Jose Manuel Grant RN 06/23/17 -  Nurse        Wound 04/20/18 0839 Right upper;other (see comments) chest incision (Active)   Dressing Appearance dry;intact 5/2/2018  2:33 PM   Closure SIN 5/2/2018  2:18 AM   Base dressing in place, unable to visualize 5/2/2018  2:33 PM   Periwound intact 5/2/2018  2:18 AM   Periwound Temperature warm 5/2/2018  2:18 AM   Drainage Amount none 5/2/2018  2:33 PM       Wound 04/26/18 2036 Right lower;other (see comments) chest incision (Active)   Dressing Appearance dry;intact 5/2/2018  2:33 PM   Closure SIN 5/2/2018  2:18 AM   Base dressing in place, unable to visualize 5/2/2018  2:33 PM   Drainage Amount none 5/2/2018  2:33 PM         Occupational Therapy Education     Title: PT OT SLP Therapies (Active)     Topic: Occupational Therapy (Active)     Point: ADL training (Done)     Description: Instruct learner(s) on proper safety adaptation and remediation techniques during self care or transfers.   Instruct in proper use of assistive devices.   Learning Progress Summary     Learner Status Readiness Method Response Comment Documented by    Patient Done Acceptance E VU  SO 04/27/18 4976                       User Key     Initials Effective Dates Name Provider Type Discipline    SO 04/13/15 -  Kayli Hendrix OTR Occupational Therapist OT                OT Recommendation and Plan  Outcome Summary/Treatment Plan (OT)  Daily Summary of Progress (OT): progress toward functional goals is good  Daily Summary of Progress (OT): progress toward functional goals is good  Plan of Care Review  Plan of Care Reviewed With: patient  Plan of Care Reviewed With: patient        Outcome Measures     Row Name 05/02/18 1545 05/02/18 1400 04/30/18 1400       How much help from another person do you currently need...    Turning from your back to your side while in flat bed without using bedrails?  -- 4  -PC 4  -CW    Moving from lying on back to sitting on the side of a flat bed without bedrails?  -- 4  -PC 4  -CW    Moving to and from a bed to a chair (including a wheelchair)?  -- 3  -PC 3  -CW    Standing up from a chair using your arms (e.g., wheelchair, bedside chair)?  -- 3  -PC 3  -CW    Climbing 3-5 steps with a railing?  -- 2  -PC 2  -CW    To walk in hospital room?  -- 3  -PC 3  -CW    AM-PAC 6 Clicks Score  -- 19  -PC 19  -CW       How much help from another is currently needed...    Putting on and taking off regular lower body clothing? 3  -KP  --  --    Bathing (including washing, rinsing, and drying) 3  -KP  --  --    Toileting (which includes using toilet bed pan or urinal) 3  -KP  --  --    Putting on and taking off regular upper body clothing 3  -KP  --  --    Taking care of personal grooming (such as brushing teeth) 3  -KP  --  --    Eating meals 3  -KP  --  --    Score 18  -KP  --  --       Functional Assessment    Outcome Measure Options AM-PAC 6 Clicks Daily Activity (OT)  -KP  -- AM-PAC 6 Clicks Basic Mobility (PT)  -CW    Row Name 04/30/18 0900             How much help from another is currently needed...    Putting on and taking off regular lower body clothing? 3  -MR      Bathing (including  washing, rinsing, and drying) 3  -MR      Toileting (which includes using toilet bed pan or urinal) 3  -MR      Putting on and taking off regular upper body clothing 3  -MR      Taking care of personal grooming (such as brushing teeth) 3  -MR      Eating meals 1  -MR      Score 16  -MR        User Key  (r) = Recorded By, (t) = Taken By, (c) = Cosigned By    Initials Name Provider Type    XIOMY Ng OTR Occupational Therapist    ANITHA Decker, PT Physical Therapist    MR Jennifer Underwood, OT Occupational Therapist    CW Edenilson Ta, PTA Physical Therapy Assistant           Time Calculation:         Time Calculation- OT     Row Name 05/02/18 1545             Time Calculation- OT    OT Start Time 1441  -      OT Stop Time 1457  -      OT Time Calculation (min) 16 min  -      OT Received On 05/02/18  -      OT - Next Appointment 05/03/18  -        User Key  (r) = Recorded By, (t) = Taken By, (c) = Cosigned By    Initials Name Provider Type     ARIANNA Hart Occupational Therapist           Therapy Charges for Today     Code Description Service Date Service Provider Modifiers Qty    [id number]  OT THER PROC EA 15 MIN 5/2/2018 ARIANNA Hart GO 1               ARIANNA Hart  5/2/2018

## 2018-05-02 NOTE — PLAN OF CARE
Problem: Patient Care Overview  Goal: Plan of Care Review  Outcome: Ongoing (interventions implemented as appropriate)   05/02/18 1430   Coping/Psychosocial   Plan of Care Reviewed With patient;family   OTHER   Outcome Summary Repeat FEES comleted, recommend nectar thick with mech soft no mixed consistencies. Meds whole/crushed with puree. Water protocol ok. If pt requesting a thin liquid for pleasure, allow 4-6 ounces without thickener. Recommend thickened liquids with any liquid over 6 ounces.

## 2018-05-02 NOTE — PROGRESS NOTES
Met w/ patient 2 sons, 1 and 2nd wife and grand daughter.  No SI or HI.  He denies interest in dying.  He was pleasant and has been cooperative.  [facility] will continue to follow.

## 2018-05-02 NOTE — PROGRESS NOTES
"    Chief Complaint: Recurrent right pleural effusion, postoperative management  S/P: Right VAT with decortication of right lung and pleurodesis  POD # 12    Subjective     Patient is more alert and oriented today.  Better affect.  Willing to cooperate with the swallow study.  Niacin shortness of breath or chest pain. He is laying in bed conversing with his family    Vital Signs:  Temp:  [97.2 °F (36.2 °C)-98 °F (36.7 °C)] 97.5 °F (36.4 °C)  Heart Rate:  [] 85  Resp:  [16] 16  BP: (118-144)/(55-73) 118/63    Intake & Output (last day)       05/01 0701 - 05/02 0700 05/02 0701 - 05/03 0700    P.O. 0     Other      NG/GT      Total Intake(mL/kg) 0 (0)     Urine (mL/kg/hr) 600 (0.4)     Stool      Total Output 600      Net -600            Unmeasured Urine Occurrence 3 x 2 x    Unmeasured Stool Occurrence 1 x 1 x          Objective:  General Appearance:  Comfortable and in no acute distress.    Vital signs: (most recent): Blood pressure 118/63, pulse 85, temperature 97.5 °F (36.4 °C), temperature source Oral, resp. rate 16, height 170.2 cm (67\"), weight 64.5 kg (142 lb 1.6 oz), SpO2 95 %.  No fever.    Output: Producing urine.    Lungs:  Normal effort and normal respiratory rate.  There are decreased breath sounds.    Heart: Normal rate.  Regular rhythm.  No murmur.   Abdomen: Abdomen is soft.  Bowel sounds are normal.   There is no abdominal tenderness.   There is no mass.   Extremities: There is no dependent edema.    Neurological: Patient is alert and oriented to person, place and time.  Normal strength.              Results Review:     I reviewed the patient's new clinical results.  I reviewed the patient's new imaging results and agree with the interpretation.    Imaging Results (last 24 hours)     ** No results found for the last 24 hours. **          Lab Results:     Lab Results (last 24 hours)     Procedure Component Value Units Date/Time    Comprehensive Metabolic Panel [[id number]]  (Abnormal) Collected:  " 05/02/18 0754    Specimen:  Blood Updated:  05/02/18 0847     Glucose 99 mg/dL      BUN 23 mg/dL      Creatinine 1.08 mg/dL      Sodium 143 mmol/L      Potassium 4.8 mmol/L      Chloride 98 mmol/L      CO2 31.1 (H) mmol/L      Calcium 9.5 mg/dL      Total Protein 6.8 g/dL      Albumin 3.20 (L) g/dL      ALT (SGPT) 47 (H) U/L      AST (SGOT) 58 (H) U/L      Alkaline Phosphatase 153 (H) U/L      Total Bilirubin 1.2 mg/dL      eGFR Non African Amer 65 mL/min/1.73      Globulin 3.6 gm/dL      A/G Ratio 0.9 g/dL      BUN/Creatinine Ratio 21.3     Anion Gap 13.9 mmol/L     Narrative:       The MDRD GFR formula is only valid for adults with stable renal function between ages 18 and 70.    Protime-INR [[id number]]  (Normal) Collected:  05/02/18 0754    Specimen:  Blood Updated:  05/02/18 0845     Protime 13.6 Seconds      INR 1.05    CBC & Differential [[id number]] Collected:  05/02/18 0754    Specimen:  Blood Updated:  05/02/18 0817    Narrative:       The following orders were created for panel order CBC & Differential.  Procedure                               Abnormality         Status                     ---------                               -----------         ------                     CBC Auto Differential[[id number]]        Abnormal            Final result                 Please view results for these tests on the individual orders.    CBC Auto Differential [[id number]]  (Abnormal) Collected:  05/02/18 0754    Specimen:  Blood Updated:  05/02/18 0817     WBC 10.48 10*3/mm3      RBC 3.78 (L) 10*6/mm3      Hemoglobin 11.8 (L) g/dL      Hematocrit 37.0 (L) %      MCV 97.9 (H) fL      MCH 31.2 pg      MCHC 31.9 (L) g/dL      RDW 14.8 (H) %      RDW-SD 53.2 fl      MPV 10.6 fL      Platelets 299 10*3/mm3      Neutrophil % 65.6 %      Lymphocyte % 19.8 %      Monocyte % 9.7 %      Eosinophil % 4.5 %      Basophil % 0.4 %      Immature Grans % 0.3 %      Neutrophils, Absolute 6.88 10*3/mm3      Lymphocytes, Absolute 2.07  10*3/mm3      Monocytes, Absolute 1.02 10*3/mm3      Eosinophils, Absolute 0.47 10*3/mm3      Basophils, Absolute 0.04 10*3/mm3      Immature Grans, Absolute 0.03 10*3/mm3     POC Glucose Once [[id number]]  (Normal) Collected:  05/01/18 1627    Specimen:  Blood Updated:  05/01/18 1628     Glucose 91 mg/dL     Narrative:       Meter: [id number] : [id number] Jewell MCDANIEL           Assessment/Plan     Principal Problem:    Pleural effusion, right  Active Problems:    Atrial fibrillation    Chronic anticoagulation    Essential hypertension    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  Currently is having a good day.  Hopefully we can get the swallow study completed.  Awaiting transfer to [facility]    Erich Serrano III, MD  [facility]  05/02/18  1:36 PM

## 2018-05-02 NOTE — PLAN OF CARE
Problem: Patient Care Overview  Goal: Plan of Care Review   05/02/18 1548   Coping/Psychosocial   Plan of Care Reviewed With patient   Plan of Care Review   Progress improving   OTHER   Outcome Summary pt completed AROM and B UE ex miah garcia to incr B UE strength.

## 2018-05-02 NOTE — THERAPY TREATMENT NOTE
Acute Care - Physical Therapy Treatment Note   [facility]     Patient Name: Harry Ventura  : 1930  MRN: [id number]  Today's Date: 2018     Date of Referral to PT: 18       Admit Date: 2018    Visit Dx:    ICD-10-CM ICD-9-CM   1. Weakness generalized R53.1 780.79   2. Pleural effusion J90 511.9   3. 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   • Anemia of chronic renal failure, stage 3 (moderate)   • Thrombocytopenia   • Vitamin B12 deficiency   • Coronary artery disease involving native coronary artery of native heart without angina pectoris   • TIA (transient ischemic attack)   • Pacemaker   • Pulmonary hypertension   • Congestive heart failure   • Viral infection   • Pleural effusion, right   • Chronic obstructive pulmonary disease (COPD)   • Pleural effusion   • Confusion, postoperative       Therapy Treatment          Rehabilitation Treatment Summary     Row Name 18 1400             Treatment Time/Intention    Discipline physical therapist  -PC      Document Type therapy note (daily note)  -PC      Subjective Information no complaints  -PC      Mode of Treatment physical therapy  -PC      Patient/Family Observations pt is in bed, no acute distress  -PC      Care Plan Review patient/other agree to care plan  -PC      Therapy Frequency (PT Clinical Impression) daily  -PC      Patient Effort good  -PC      Recorded by [PC] Allyn Decker, PT [id number]  05/02/18 1407      Row Name 05/02/18 1400             Cognitive Assessment/Intervention- PT/OT    Affect/Mental Status (Cognitive) WNL  -PC      Follows Commands (Cognition) WNL  -PC      Personal Safety Interventions fall prevention program maintained;gait belt;muscle strengthening facilitated  -PC      Recorded by [PC] Allyn Decker, PT 05/02/18 1407 05/02/18 1407      Row Name 05/02/18 1400             Bed Mobility Assessment/Treatment    Supine-Sit [place] (Bed Mobility) minimum assist (75% patient effort)  -PC      Sit-Supine [place] (Bed Mobility) minimum assist (75% patient effort)  -PC      Recorded by [PC] Allyn Decker, PT 05/02/18 1407 05/02/18 1407      Row Name 05/02/18 1400             Transfer Assessment/Treatment    Sit-Stand [place] (Transfers) contact guard  -PC      Stand-Sit [place] (Transfers) contact guard  -PC      Recorded by [PC] Allyn Decker, PT 05/02/18 1407 05/02/18 1407      Row Name 05/02/18 1400             Sit-Stand Transfer    Assistive Device (Sit-Stand Transfers) walker, front-wheeled  -PC      Recorded by [PC] Allyn Decker, PT 05/02/18 1407 05/02/18 1407      Row Name 05/02/18 1400             Stand-Sit Transfer    Assistive Device (Stand-Sit Transfers) walker, front-wheeled  -PC      Recorded by [PC] Allyn Decker, PT 05/02/18 1407 05/02/18 1407      Row Name 05/02/18 1400             Gait/Stairs Assessment/Training    [place] Level (Gait) contact guard  -PC      Assistive Device (Gait) walker, front-wheeled  -PC      Distance in Feet (Gait) 80 ft  -PC      Pattern (Gait) step-through  -PC      Deviations/Abnormal Patterns (Gait) jocelyn decreased;stride length decreased  -PC      Bilateral Gait Deviations forward flexed posture  -PC      Comment (Gait/Stairs) worked on gait quality with upright posture, inc step length  -PC      Recorded by [PC] Allyn Decker, PT 05/02/18 1407 05/02/18 1407      Row Name 05/02/18 1400             Therapeutic  Exercise    Comment (Therapeutic Exercise) B AP, LAQ, shoulder flexion, shoulder rolls  -PC      Recorded by [PC] Allyn Decker, PT 05/02/18 1407 05/02/18 1407      Row Name 05/02/18 1400             Positioning and Restraints    Pre-Treatment Position in bed  -PC      Post Treatment Position chair  -PC      In Chair sitting;call light within reach;encouraged to call for assist;exit alarm on;with family/caregiver  -PC      Recorded by [PC] Allyn Decker, PT 05/02/18 1407 05/02/18 1407      Row Name                Wound 04/20/18 0839 Right upper;other (see comments) chest incision    Wound - Properties Group Date first assessed: 04/20/18 [SOPHY] Time first assessed: 0839 [SOPHY] Side: Right [SOPHY] Orientation: upper;other (see comments) [JR], midaxillary  Location: chest [SOPHY] Type: incision [SOPHY] Additional Comments: s/p chest tube site. [JR] Recorded by:  [SOPHY] Jordyn Culver RN 04/20/18 0839 04/20/18 0839 [JR] Jose Manuel Grant RN 04/26/18 2139 04/26/18 2139    Row Name                Wound 04/26/18 2036 Right lower;other (see comments) chest incision    Wound - Properties Group Date first assessed: 04/26/18 [JR] Time first assessed: 2036 [JR] Side: Right [JR] Orientation: lower;other (see comments) [JR], midaxillary  Location: chest [JR] Type: incision [JR] Additional Comments: s/p chest tube site [JR] Recorded by:  [JR] Jose Manuel Grant RN 04/26/18 2141 04/26/18 2141    Row Name 05/02/18 1400             Outcome Summary/Treatment Plan (PT)    Anticipated Discharge Disposition (PT) inpatient rehab facility  -PC      Recorded by [PC] Allyn Decker, PT 05/02/18 1409 05/02/18 1409        User Key  (r) = Recorded By, (t) = Taken By, (c) = Cosigned By    Initials Name Effective Dates Discipline    PC Allyn Decker, PT 04/03/18 -  PT    SOPHY Culver RN 02/18/16 -  Nurse    JR Jose Manuel Grant RN 06/23/17 -  Nurse          Wound 04/20/18 0839 Right upper;other (see comments) chest incision (Active)   Dressing Appearance dry;intact 5/2/2018   8:47 AM   Closure SIN 5/2/2018  2:18 AM   Base dressing in place, unable to visualize 5/2/2018  8:47 AM   Periwound intact 5/2/2018  2:18 AM   Periwound Temperature warm 5/2/2018  2:18 AM   Drainage Amount none 5/2/2018  8:47 AM       Wound 04/26/18 2036 Right lower;other (see comments) chest incision (Active)   Dressing Appearance dry;intact 5/2/2018  8:47 AM   Closure SIN 5/2/2018  2:18 AM   Base dressing in place, unable to visualize 5/2/2018  8:47 AM   Drainage Amount none 5/2/2018  8:47 AM             Physical Therapy Education     Title: PT OT SLP Therapies (Active)     Topic: Physical Therapy (Active)     Point: Mobility training (Active)    Learning Progress Summary     Learner Status Readiness Method Response Comment Documented by    Patient Active Acceptance E,D NR  PC 05/02/18 1409     Done Acceptance E,TB VU,DU  CW 04/30/18 1421     Done Acceptance E VU  MA 04/28/18 1545     Done Acceptance E,TB,D VU,NR  RW 04/27/18 1353     Done Acceptance E VU,NR  RW 04/26/18 1552     Done Acceptance E,TB DU,VU  PV 04/25/18 1135     Done Acceptance E,TB VU,DU,NR  PV 04/24/18 1416     Done Acceptance E,TB VU,DU,NR  PV 04/23/18 1106     Done Acceptance E VU  AL 04/21/18 1101          Point: Home exercise program (Active)    Learning Progress Summary     Learner Status Readiness Method Response Comment Documented by    Patient Active Acceptance E,D NR  PC 05/02/18 1409     Done Acceptance E,TB VU,DU  CW 04/30/18 1421     Done Acceptance E VU  MA 04/28/18 1545     Done Acceptance E,TB DU,VU  PV 04/25/18 1135     Done Acceptance E,TB VU,DU,NR  PV 04/24/18 1416     Done Acceptance E,TB VU,DU,NR  PV 04/23/18 1106          Point: Body mechanics (Active)    Learning Progress Summary     Learner Status Readiness Method Response Comment Documented by    Patient Active Acceptance E,D NR  PC 05/02/18 1409     Done Acceptance E,TB VU,DU  CW 04/30/18 1421     Done Acceptance E VU  MA 04/28/18 1545     Done Acceptance E,TB,D VU,NR   RW 04/27/18 1353     Done Acceptance E VU,NR  RW 04/26/18 1552     Done Acceptance E,TB DU,VU  PV 04/25/18 1135     Done Acceptance E,TB VU,DU,NR  PV 04/24/18 1416     Done Acceptance E,TB VU,DU,NR  PV 04/23/18 1106     Done Acceptance E VU  AL 04/21/18 1101          Point: Precautions (Active)    Learning Progress Summary     Learner Status Readiness Method Response Comment Documented by    Patient Active Acceptance E,D NR  PC 05/02/18 1409     Done Acceptance E,TB VU,DU  CW 04/30/18 1421     Done Acceptance E VU  MA 04/28/18 1545     Done Acceptance E,TB,D VU,NR  RW 04/27/18 1353     Done Acceptance E VU,NR  RW 04/26/18 1552     Done Acceptance E,TB DU,VU  PV 04/25/18 1135     Done Acceptance E,TB VU,DU,NR  PV 04/24/18 1416     Done Acceptance E,TB VU,DU,NR  PV 04/23/18 1106     Done Acceptance E VU  AL 04/21/18 1101                      User Key     Initials Effective Dates Name Provider Type Discipline     04/03/18 -  Allyn Decker, PT Physical Therapist PT    AL 04/03/18 -  Ricarda Adams, PT Physical Therapist PT    RW 03/07/18 -  Ann-Marie Booth, PTA Physical Therapy Assistant PT    MA 04/03/18 -  Dayanara Thompson, PT Physical Therapist PT     03/07/18 -  Edenilson Ta, PTA Physical Therapy Assistant PT    PV 03/07/18 -  Balbir Shin, PT Student PT Student PT                    PT Recommendation and Plan  Anticipated Discharge Disposition (PT): inpatient rehab facility  Therapy Frequency (PT Clinical Impression): daily  Outcome Summary/Treatment Plan (PT)  Anticipated Discharge Disposition (PT): inpatient rehab facility  Plan of Care Reviewed With: patient  Progress: improving  Outcome Summary: pt showing slow steady progress with mobility, will cont to benefit from PT to advance with strengthening and progression of activity          Outcome Measures     Row Name 05/02/18 1400 04/30/18 1400 04/30/18 0900       How much help from another person do you currently need...    Turning from your back to  your side while in flat bed without using bedrails? 4  -PC 4  -CW  --    Moving from lying on back to sitting on the side of a flat bed without bedrails? 4  -PC 4  -CW  --    Moving to and from a bed to a chair (including a wheelchair)? 3  -PC 3  -CW  --    Standing up from a chair using your arms (e.g., wheelchair, bedside chair)? 3  -PC 3  -CW  --    Climbing 3-5 steps with a railing? 2  -PC 2  -CW  --    To walk in hospital room? 3  -PC 3  -CW  --    AM-PAC 6 Clicks Score 19  -PC 19  -CW  --       How much help from another is currently needed...    Putting on and taking off regular lower body clothing?  --  -- 3  -MR    Bathing (including washing, rinsing, and drying)  --  -- 3  -MR    Toileting (which includes using toilet bed pan or urinal)  --  -- 3  -MR    Putting on and taking off regular upper body clothing  --  -- 3  -MR    Taking care of personal grooming (such as brushing teeth)  --  -- 3  -MR    Eating meals  --  -- 1  -MR    Score  --  -- 16  -MR       Functional Assessment    Outcome Measure Options  -- AM-PAC 6 Clicks Basic Mobility (PT)  -CW  --      User Key  (r) = Recorded By, (t) = Taken By, (c) = Cosigned By    Initials Name Provider Type    PC Allyn Decker, PT Physical Therapist    MR Jennifer Underwood, OT Occupational Therapist    CW Edenilson Ta, PTA Physical Therapy Assistant           Time Calculation:         PT Charges     Row Name 05/02/18 1411             Time Calculation    Start Time 1349  -PC      Stop Time 1403  -PC      Time Calculation (min) 14 min  -PC      PT Received On 05/02/18  -PC      PT - Next Appointment 05/03/18  -PC        User Key  (r) = Recorded By, (t) = Taken By, (c) = Cosigned By    Initials Name Provider Type    ANITHA Decker, PT Physical Therapist          Therapy Charges for Today     Code Description Service Date Service Provider Modifiers Qty    44924909814 HC PT THER PROC EA 15 MIN 5/2/2018 Allyn Decker PT GP 1          PT G-Codes  Outcome  Measure Options: AM-PAC 6 Clicks Basic Mobility (PT)    Allyn Decker, PT  5/2/2018

## 2018-05-02 NOTE — MBS/VFSS/FEES
Acute Care - Speech Language Pathology   Swallow Re-Evaluation  [facility]     Patient Name: Harry Ventura  : 1930  MRN: [id number]  Today's Date: 2018               Admit Date: 2018    Visit Dx:     ICD-10-CM ICD-9-CM   1. Weakness generalized R53.1 780.79   2. Pleural effusion J90 511.9   3. 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   • Anemia of chronic renal failure, stage 3 (moderate)   • Thrombocytopenia   • Vitamin B12 deficiency   • Coronary artery disease involving native coronary artery of native heart without angina pectoris   • TIA (transient ischemic attack)   • Pacemaker   • Pulmonary hypertension   • Congestive heart failure   • Viral infection   • Pleural effusion, right   • Chronic obstructive pulmonary disease (COPD)   • Pleural effusion   • Confusion, postoperative     Past Medical History:   Diagnosis Date   • Accelerated essential hypertension    • Atrial fibrillation    • CHF (congestive heart failure)    • Chronic anticoagulation    • Chronic diastolic CHF (congestive heart failure)    • CLL (chronic lymphocytic leukemia)     Stage 0   • Coronary artery disease    • ED (erectile dysfunction) of non-organic origin    • Gait instability    • GERD (gastroesophageal reflux disease)    • Glucose intolerance (malabsorption)    • Hypercholesterolemia    • Hypertension     Pulmonary   • Hypogonadism male    • Low back pain    • Pacemaker    • Personal  history of CLL (chronic lymphocytic leukemia)    • Personal history of venous thrombosis and embolism    • Pleural effusion    • TIA (transient ischemic attack) 2017     Past Surgical History:   Procedure Laterality Date   • CARDIAC CATHETERIZATION N/A 6/17/2016    Procedure: Right and Left Heart Cath;  Surgeon: Harry Bolton MD;  Location: [facility] CATH INVASIVE LOCATION;  Service:    • CARDIAC CATHETERIZATION N/A 6/17/2016    Procedure: Coronary angiography;  Surgeon: Harry Bolton MD;  Location: [facility] CATH INVASIVE LOCATION;  Service:    • CARDIAC CATHETERIZATION N/A 6/17/2016    Procedure: Left Heart Cath;  Surgeon: Harry Bolton MD;  Location: [facility] CATH INVASIVE LOCATION;  Service:    • CARDIAC CATHETERIZATION N/A 6/17/2016    Procedure: Left ventriculography;  Surgeon: Harry Bolton MD;  Location: [facility] CATH INVASIVE LOCATION;  Service:    • CHOLECYSTECTOMY  1983   • PACEMAKER IMPLANTATION     • THORACOSCOPY Right 4/20/2018    Procedure: BRONCHOSCOPY, RIGHT VIDEO ASSISTED THORACOSCOPY, RIGHT LUNG DECORTICATION WITH PLEURAL BIOPSY, PLEURODESIS;  Surgeon: Erich Serrano III, MD;  Location: [facility];  Service: Cardiothoracic   • UMBILICAL HERNIA REPAIR  2006    Performed laparoscopically by Dr. Garcia.          SWALLOW EVALUATION (last 72 hours)      SLP Adult Swallow Evaluation     Row Name 05/02/18 1430          Document Type re-evaluation  -OC    Subjective Information no complaints  -OC    Patient Observations alert;cooperative;agree to therapy  -OC    Patient Effort good  -OC          Patient Profile Reviewed yes  -OC    Current Method of Nutrition NPO  -OC    Prior Level of Function-Swallowing no diet consistency restrictions  -OC    Plans/Goals Discussed with patient and family  -OC    Barriers to Rehab medically complex  -OC    Patient's Goals for Discharge return to PO diet  -OC    Family Goals for Discharge patient able to return to PO diet  -OC          Pain Scale: Numbers, Pretreatment 0/10 - no pain   -OC    Pain Scale: Numbers, Post-Treatment 0/10 - no pain  -OC          Oral Motor General Assessment WFL  -OC          Risks/Benefits Reviewed risks/benefits explained;patient;family;agreed to eval  -OC    Nasal Entry right:;other (see comments)   using no topical anesthetic  -OC          Anatomic Considerations no anatomic structural deviation  -OC    Velopharyngeal WFL  -OC    Base of Tongue symmetrical;range reduced  -OC    Epiglottis WFL  -OC    Laryngeal Function Breathing symmetrical  -OC    Laryngeal Function Phonation symmetrical  -OC    Laryngeal Function to Breath Hold TVF contact;sustained closure  -OC    Secretion Rating Scale (Maxi et al. 1996) 0- normal, no visible secretions  -OC    Secretion Description thin;clear  -OC    Ice Chips elicited swallow;partially cleared secretions  -OC    Spontaneous Swallow frequency reduced  -OC    Sensory reduced sensation  -OC    Utensils Used Spoon;Cup;Straw  -OC          Oral Phase prespill of liquids into pharynx  -OC          Pharyngeal Phase impaired pharyngeal phase of swallowing  -OC    Penetration Before the Swallow thin liquids;pudding/puree;nectar-thick liquids;other (see comments)   epiglottic undercoating  -OC    Penetration After the Swallow thin liquids;secondary to residue;in pyriform sinuses   posterior  -OC    Residue all consistencies tested;valleculae;base of tongue;pyriform sinuses;posterior pharyngeal wall;secondary to reduced base of tongue retraction;secondary to reduced posterior pharyngeal wall stripping;secondary to reduced laryngeal elevation;secondary to reduced hyolaryngeal excursion;other (see comments)   mild residue, cued swallows (2-3) to clear  -OC    Response to Residue other (see comments)   partially clears with cued swallow  -OC    Attempted Compensatory Maneuvers multiple swallows  -OC    FEES Summary Velopharyngeal function was WFL and Characterized by symmetrical movement. Epiglottis was Adequate in appearance. Tongue base  movement was symmetrical with reduced range of motion. Laryngeal function was characterized by movement of the vocal folds that was symmetrical and adequate in range during quiet breathing and phonation. Pt with prespill to the vallecular with ice chip and to the pyriforms with thins.  Pt with posterior penetration thins after the swallow via the interarytenoid space. No pen/asp with nectar thick, puree, mech soft no mixed, and regular.  Prolonged mastication and residue underside of epiglottis with regular textures.   -OC          SLP Swallowing Diagnosis mild;oral dysfunction;pharyngeal dysfunction  -OC    Functional Impact risk of aspiration/pneumonia  -OC    Rehab Potential/Prognosis, Swallowing good, to achieve stated therapy goals  -OC    Criteria for Skilled Therapeutic Interventions Met demonstrates skilled criteria  -OC          Therapy Frequency (Swallow) PRN  -OC    Predicted Duration Therapy Intervention (Days) until discharge  -OC    SLP Diet Recommendation mechanical soft with no mixed consistencies;nectar thick liquids;water between meals after oral care, with supervision  -OC    Recommended Diagnostics reassess via VFSS (MBS)  -OC    Recommended Precautions and Strategies upright posture during/after eating;small bites of food and sips of liquid;multiple swallows per bite of food;multiple swallows per sip of liquid;alternate between small bites of food and sips of liquid;hard swallow with each bite or sip  -OC    SLP Rec. for Method of Medication Administration meds whole;meds crushed;with pudding or applesauce  -OC    Monitor for Signs of Aspiration yes;notify SLP if any concerns;elevated WBC count;gurgly voice;throat clearing;fever;upper respiratory;pneumonia;right lower lobe infiltrates  -OC    Anticipated Dischage Disposition unknown  -OC      User Key  (r) = Recorded By, (t) = Taken By, (c) = Cosigned By    Initials Name Effective Dates    OC Genny Alva MA,CCC-SLP 04/05/17 -          EDUCATION  The patient has been educated in the following areas:   Dysphagia (Swallowing Impairment).    SLP Recommendation and Plan  SLP Swallowing Diagnosis: mild, oral dysfunction, pharyngeal dysfunction  SLP Diet Recommendation: mechanical soft with no mixed consistencies, nectar thick liquids, water between meals after oral care, with supervision  Recommended Precautions and Strategies: upright posture during/after eating, small bites of food and sips of liquid, multiple swallows per bite of food, multiple swallows per sip of liquid, alternate between small bites of food and sips of liquid, hard swallow with each bite or sip     Monitor for Signs of Aspiration: yes, notify SLP if any concerns, elevated WBC count, gurgly voice, throat clearing, fever, upper respiratory, pneumonia, right lower lobe infiltrates  Recommended Diagnostics: reassess via VFSS ([facility])  Criteria for Skilled Therapeutic Interventions Met: demonstrates skilled criteria  Anticipated Dischage Disposition: unknown  Rehab Potential/Prognosis, Swallowing: good, to achieve stated therapy goals  Therapy Frequency (Swallow): PRN  Predicted Duration Therapy Intervention (Days): until discharge       Plan of Care Reviewed With: patient, family  Plan of Care Review  Plan of Care Reviewed With: patient, family  Outcome Summary: Repeat FEES comleted, recommend nectar thick with mech soft no mixed consistencies. Meds whole/crushed with puree.  Water protocol ok.  If pt requesting a thin liquid for pleasure, allow 4-6 ounces without thickener. Recommend thickened liquids with any liquid over 6 ounces.            SLP Outcome Measures (last 72 hours)      SLP Outcome Measures     Row Name 05/02/18 5710             SLP Outcome Measures    Outcome Measure Used? Adult NOMS  -OC         FCM Scores    FCM Chosen Swallowing  -OC      Swallowing FCM Score 4  -OC        User Key  (r) = Recorded By, (t) = Taken By, (c) = Cosigned By    Initials Name Effective  Dates    OC Genny Alva MA,CCC-SLP 04/05/17 -            Time Calculation:         Time Calculation- SLP     Row Name 05/02/18 1558             Time Calculation- SLP    SLP Start Time 1300  -OC        User Key  (r) = Recorded By, (t) = Taken By, (c) = Cosigned By    Initials Name Provider Type    OC Genny Alva MA,CCC-SLP Speech and Language Pathologist          Therapy Charges for Today     Code Description Service Date Service Provider Modifiers Qty    [id number]  ST FIBEROPTIC ENDO EVAL SWALL 6 5/2/2018 Genny Alva MA,CCC-SLP GN 1               Genny Alva MA,CHEMO-SLP  5/2/2018

## 2018-05-02 NOTE — PLAN OF CARE
Problem: Patient Care Overview  Goal: Plan of Care Review  Outcome: Ongoing (interventions implemented as appropriate)   05/02/18 6622   Coping/Psychosocial   Plan of Care Reviewed With patient   Plan of Care Review   Progress improving   OTHER   Outcome Summary GRAHAM Passed swallow study today and is now able to have a mech thick diet with nectar thick liquids. A&O and cooperative. Working with PT and OT. Will monitor.     Goal: Individualization and Mutuality  Outcome: Ongoing (interventions implemented as appropriate)    Goal: Discharge Needs Assessment  Outcome: Ongoing (interventions implemented as appropriate)    Goal: Interprofessional Rounds/Family Conf  Outcome: Ongoing (interventions implemented as appropriate)      Problem: Fall Risk (Adult)  Goal: Identify Related Risk Factors and Signs and Symptoms  Outcome: Ongoing (interventions implemented as appropriate)    Goal: Absence of Fall  Outcome: Ongoing (interventions implemented as appropriate)      Problem: Skin Injury Risk (Adult)  Goal: Identify Related Risk Factors and Signs and Symptoms  Outcome: Ongoing (interventions implemented as appropriate)    Goal: Skin Health and Integrity  Outcome: Ongoing (interventions implemented as appropriate)      Problem: Pain, Acute (Adult)  Goal: Identify Related Risk Factors and Signs and Symptoms  Outcome: Ongoing (interventions implemented as appropriate)    Goal: Acceptable Pain Control/Comfort Level  Outcome: Ongoing (interventions implemented as appropriate)      Problem: Nutrition, Enteral (Adult)  Goal: Signs and Symptoms of Listed Potential Problems Will be Absent, Minimized or Managed (Nutrition, Enteral)  Outcome: Ongoing (interventions implemented as appropriate)

## 2018-05-02 NOTE — PROGRESS NOTES
[facility]  Progress note: 2018    Patient Identification:  Name:Harry Ventura  Age:87 y.o.  Sex: male  :  1930  MRN: [id number]           CC:  follow up CHF, AF    Interval history:  Has started taking orals.  Alert and oriented.  He denies any chest pain.  He feels weak.  Noted plans for transfer to [facility] later today    Vital Signs:   Temp:  [97.2 °F (36.2 °C)-98 °F (36.7 °C)] 97.5 °F (36.4 °C)  Heart Rate:  [] 85  Resp:  [16] 16  BP: (118-144)/(55-73) 118/63    Intake/Output Summary (Last 24 hours) at 18 1509  Last data filed at 18 0100   Gross per 24 hour   Intake                0 ml   Output              350 ml   Net             -350 ml       Physical Examination:    General Appearance No acute distress   Neck No adenopathy, supple, trachea midline, no thyromegaly, no carotid bruit, no JVD   Lungs Clear to auscultation,respirations regular, even and unlabored   Heart Irregular rhythm and normal rate, normal S1 and S2, no murmur, no gallop, no rub, no click   Chest wall No abnormalities observed   Abdomen Normal bowel sounds, no masses, no hepatomegaly, soft   Extremities Moves all extremities well, no edema, no cyanosis, no redness   Neurological Alert and oriented x 3     Lab Review:  Personally reviewed the labs, radiology imaging and other cardiac procedures.   Results from last 7 days  Lab Units 18  0754   SODIUM mmol/L 143   POTASSIUM mmol/L 4.8   CHLORIDE mmol/L 98   CO2 mmol/L 31.1*   BUN mg/dL 23   CREATININE mg/dL 1.08   CALCIUM mg/dL 9.5   BILIRUBIN mg/dL 1.2   ALK PHOS U/L 153*   ALT (SGPT) U/L 47*   AST (SGOT) U/L 58*   GLUCOSE mg/dL 99         )  Results from last 7 days  Lab Units 18  0754 18  0549 18  0351   WBC 10*3/mm3 10.48 11.26* 9.52   HEMOGLOBIN g/dL 11.8* 9.9* 9.8*   HEMATOCRIT % 37.0* 31.8* 32.2*   PLATELETS 10*3/mm3 299 247 233       Results from last 7 days  Lab Units 18  0754 18  0839   INR  1.05 1.09        Medication Review:   Meds reviewed  Scheduled Meds:  arformoterol 15 mcg Nebulization BID - RT   aspirin 81 mg Oral Daily   atorvastatin 40 mg Oral Daily   docusate sodium 100 mg Oral BID   famotidine 20 mg Intravenous Q12H   furosemide 40 mg Oral Daily   heparin (porcine) 5,000 Units Subcutaneous Q8H   losartan 25 mg Oral Q24H   metoprolol tartrate 75 mg Oral Q12H   polyethylene glycol 17 g Oral Daily   potassium chloride 40 mEq Oral Once   sennosides-docusate sodium 2 tablet Oral Nightly   warfarin 2.5 mg Oral Daily     I personally viewed and interpreted the patient's EKG/Telemetry data    Assessment and Plan  1. Recurrent right pleural effusion s/p VATS - chest tubes out.  2. Acute on chronic diastolic heart failure -stable on oral diuretics. EF 63% in 06/2017.   3. Persistent atrial fibrillation -  Rates are controlled and start warfarin today as he is tolerating orals.  INRs can be called to my office.  We'll plan on seeing him in follow-up in 4 weeks per patient's request  4. SSS s/p PPM  5. Pulm HTN - RSVP  49 mmHg 06/2017  6. Questionable wide complex arrhythmia and ST T wave changes.  no recurrence   7. HTN - better with the addition of losartan. Continue current regimen.   8. Post op confusion  9. Dysphagia - he is on tube feeding  10. Mild MR Nidhi Juarez  5/2/20183:09 PM  25min spent in reviewing records, discussion and examination of the patient and discussion with other members of the patient's medical team.     Dictated utilizing Dragon dictation

## 2018-05-03 ENCOUNTER — EPISODE CHANGES (OUTPATIENT)
Dept: CASE MANAGEMENT | Facility: OTHER | Age: 83
End: 2018-05-03

## 2018-05-03 VITALS
TEMPERATURE: 98 F | BODY MASS INDEX: 22.3 KG/M2 | WEIGHT: 142.1 LBS | HEIGHT: 67 IN | HEART RATE: 76 BPM | OXYGEN SATURATION: 98 % | DIASTOLIC BLOOD PRESSURE: 48 MMHG | SYSTOLIC BLOOD PRESSURE: 100 MMHG | RESPIRATION RATE: 16 BRPM

## 2018-05-03 LAB
INR PPP: 1.1 (ref 0.9–1.1)
PROTHROMBIN TIME: 14 SECONDS (ref 11.7–14.2)

## 2018-05-03 PROCEDURE — 25010000002 HEPARIN (PORCINE) PER 1000 UNITS: Performed by: THORACIC SURGERY (CARDIOTHORACIC VASCULAR SURGERY)

## 2018-05-03 PROCEDURE — 94799 UNLISTED PULMONARY SVC/PX: CPT

## 2018-05-03 PROCEDURE — 85610 PROTHROMBIN TIME: CPT | Performed by: INTERNAL MEDICINE

## 2018-05-03 PROCEDURE — 97110 THERAPEUTIC EXERCISES: CPT

## 2018-05-03 PROCEDURE — 99024 POSTOP FOLLOW-UP VISIT: CPT | Performed by: THORACIC SURGERY (CARDIOTHORACIC VASCULAR SURGERY)

## 2018-05-03 RX ADMIN — METOPROLOL TARTRATE 75 MG: 25 TABLET ORAL at 08:41

## 2018-05-03 RX ADMIN — ATORVASTATIN CALCIUM 40 MG: 20 TABLET, FILM COATED ORAL at 08:41

## 2018-05-03 RX ADMIN — FAMOTIDINE 20 MG: 20 TABLET, FILM COATED ORAL at 08:41

## 2018-05-03 RX ADMIN — DOCUSATE SODIUM 100 MG: 100 CAPSULE, LIQUID FILLED ORAL at 08:40

## 2018-05-03 RX ADMIN — LOSARTAN POTASSIUM 25 MG: 25 TABLET, FILM COATED ORAL at 08:41

## 2018-05-03 RX ADMIN — FUROSEMIDE 40 MG: 40 TABLET ORAL at 08:41

## 2018-05-03 RX ADMIN — HEPARIN SODIUM 5000 UNITS: 5000 INJECTION, SOLUTION INTRAVENOUS; SUBCUTANEOUS at 14:24

## 2018-05-03 RX ADMIN — HEPARIN SODIUM 5000 UNITS: 5000 INJECTION, SOLUTION INTRAVENOUS; SUBCUTANEOUS at 05:17

## 2018-05-03 RX ADMIN — ARFORMOTEROL TARTRATE 15 MCG: 15 SOLUTION RESPIRATORY (INHALATION) at 08:57

## 2018-05-03 RX ADMIN — ASPIRIN 81 MG: 81 TABLET ORAL at 08:41

## 2018-05-03 NOTE — PROGRESS NOTES
Case Management Discharge Note    Final Note: Pt discharged to [facility].  Transported by son.     Destination - Selection Complete     Service Request Status Selected Specialties Address Phone Number Fax Number    [facility] [place] [facility] [address] [place] [address] [phone] --      Durable Medical Equipment     No service coordination in this encounter.      Dialysis/Infusion     No service coordination in this encounter.      Home Medical Care     Service Request Status Selected Specialties Address Phone Number Fax Number    [facility] [place] [facility] [address] [address] [phone]        Evelyn Shaffer, RN 4/23/2018 1308    Spoke with Baylee                 Social Care     No service coordination in this encounter.        Other: Other (private auto)    Final Discharge Disposition Code: 62 - inpatient rehab facility

## 2018-05-03 NOTE — PLAN OF CARE
Problem: Patient Care Overview  Goal: Plan of Care Review  Outcome: Ongoing (interventions implemented as appropriate)   05/03/18 0057   Coping/Psychosocial   Plan of Care Reviewed With patient   Plan of Care Review   Progress improving   OTHER   Outcome Summary VSS, pt is calm and cooperative, disoriented to situation and unsure about date, repeating the same questions, seems anxious about possible discharge to [facility] tomorrow. sw pt wife over the phone to review d/c plan and told her we would contact her tomorrow with more details once we have them. will cont to monitor.        Problem: Fall Risk (Adult)  Goal: Identify Related Risk Factors and Signs and Symptoms  Outcome: Outcome(s) achieved Date Met: 05/03/18 05/03/18 0057   Fall Risk (Adult)   Related Risk Factors (Fall Risk) age-related changes;culprit medication(s);fatigue/slow reaction;sensory deficits;environment unfamiliar;gait/mobility problems   Signs and Symptoms (Fall Risk) presence of risk factors     Goal: Absence of Fall  Outcome: Ongoing (interventions implemented as appropriate)   05/03/18 0057   Fall Risk (Adult)   Absence of Fall making progress toward outcome       Problem: Skin Injury Risk (Adult)  Goal: Identify Related Risk Factors and Signs and Symptoms  Outcome: Outcome(s) achieved Date Met: 05/03/18 05/03/18 0057   Skin Injury Risk (Adult)   Related Risk Factors (Skin Injury Risk) advanced age;cognitive impairment;hospitalization prolonged;medication;mobility impaired;nutritional deficiencies     Goal: Skin Health and Integrity  Outcome: Ongoing (interventions implemented as appropriate)   05/03/18 0057   Skin Injury Risk (Adult)   Skin Health and Integrity making progress toward outcome       Problem: Pain, Acute (Adult)  Goal: Identify Related Risk Factors and Signs and Symptoms  Outcome: Outcome(s) achieved Date Met: 05/03/18 05/01/18 1833 05/03/18 0057   Pain, Acute (Adult)   Related Risk Factors (Acute Pain) --  disease  process;knowledge deficit;patient perception;procedure/treatment   Signs and Symptoms (Acute Pain) fatigue/weakness;guarding/abnormal posturing/positioning;impaired thought process/concentration --      Goal: Acceptable Pain Control/Comfort Level  Outcome: Ongoing (interventions implemented as appropriate)   05/03/18 0057   Pain, Acute (Adult)   Acceptable Pain Control/Comfort Level making progress toward outcome

## 2018-05-03 NOTE — DISCHARGE SUMMARY
Patient Care Team:  Talat Cortez MD as PCP - General (Family Medicine)  NARINDER Blunt as PCP - Claims Attributed  Nidhi Juarez MD as Consulting Physician (Cardiology)  Karina Grace MD as Consulting Physician (Pulmonary Disease)  Delia Brooks MD PhD as Consulting Physician (Hematology and Oncology)  Erich Restrepo MD as Referring Physician (Family Medicine)  Krishna Banks RN as Care Coordinator (Population Health)  Nidhi Juarez MD as Consulting Physician (Cardiology)  Erich Serrano III, MD as Surgeon (Thoracic Surgery)    Date of Admission: 4/20/2018   Date of Discharge:  5/3/2018    Discharge Diagnosis: Recurrent right pleural effusion, atrial fibrillation on chronic anticoagulation, 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 male who has a history of pulmonary hypertension and dyspnea on exertion.  He has a history of chronic leukemia service.  He also has a history of acute on chronic diastolic congestive heart failure.  He also has a history of atrial fibrillation and is chronically anticoagulated with Coumadin.  He has developed a large right pleural effusion.  This effusion has been tapped 3 different times.  Each time more than 1500 cc of fluid had been aspirated.  He is here now for further evaluation and treatment of this effusion.     The patient has a history of heavy alcohol consumption drinking 3-4 alcoholic drinks per day. He has a history of sleep apnea and is currently on oxygen at night.  Recent echocardiogram shows moderate pulmonary hypertension with an RV systolic pressure of 47-53.  He has no cough or hemoptysis.  He has no hoarseness or change in his voice.  He denies pleuritic pain.  He is very  sedentary.  As exerting himself he gets short of breath.  He notes that this shortness of breath is coming on easier now.    Hospital Course  Patient was admitted to the hospital on April 20 for a right VAT with pleural biopsy and decortication and pleurodesis.  Procedure went well and the patient tolerated the surgery without complication.  In the recovery room he was extubated awake and alert experiencing no shortness of breath.  He was transferred to the intensive care unit for close observation.  He did well the first 24 ours after surgery.  The second postoperative night he became very confused and disoriented.  The next several days were spent trying to correct his confusion.  This was thought to be related to alcohol withdrawal.  He was placed on the withdrawal protocol and this seemed to help.  He was transferred to [facility].  Physical therapy worked with him but he remained quite weak.  Because of his confusion and weakness it was difficult for him to eat without aspiration.  He was kept nothing by mouth and a core track feeding tube was in place.  He pulled this out several times.  Eventually the we were able to leave the core track out.  He was evaluated by speech therapy and was started on a diet.  All chest tubes were removed by the fifth postoperative day.  Chest x-ray has remained stable with no recurrence of his effusion.  He was seen by cardiology because of his chronic atrial fibrillation and chronic anticoagulation.  Heart rate has been controlled.  Anticoagulation has been restarted.  At the time of discharge he is awake and alert and oriented.  He is taking age for dysphasia diet and tolerating this well.  He rate remains weak but is cooperative.  Physical therapy.  He is being transferred to [facility] for recovery before going home.  He will return to my office in 6 weeks with a repeat chest x-ray    Procedures Performed  Procedure(s):  BRONCHOSCOPY, RIGHT VIDEO ASSISTED THORACOSCOPY,  RIGHT LUNG DECORTICATION WITH PLEURAL BIOPSY, PLEURODESIS       Consults:   Consults     Date and Time Order Name Status Description    4/30/2018 1110 Inpatient Physical Medicine Rehab Consult      4/20/2018 1401 Inpatient Cardiology Consult Completed           Pertinent Test Results:     Imaging Results (last 24 hours)     ** No results found for the last 24 hours. **          Lab Results (last 24 hours)     Procedure Component Value Units Date/Time    Protime-INR [[id number]]  (Normal) Collected:  05/03/18 0447    Specimen:  Blood Updated:  05/03/18 0546     Protime 14.0 Seconds      INR 1.10            Condition on Discharge:  Improved    Vital Signs  Temp:  [97.8 °F (36.6 °C)-97.9 °F (36.6 °C)] 97.9 °F (36.6 °C)  Heart Rate:  [61-76] 76  Resp:  [16] 16  BP: (108-119)/(60) 119/60    Physical Exam:  Chest: Incisions are healing slowly.  No gross infection.  Lungs are clear anteriorly with diminished breath sounds at the bases    Cardiac: Irregular rhythm with regular rate dependent edema    Discharge Disposition  Rehab today    Discharge Medications   Harry Ventura   Home Medication Instructions ECTOR:[id number]    Printed on:05/03/18 3231   Medication Information                      aspirin 81 MG EC tablet  Take 81 mg by mouth Daily.             atorvastatin (LIPITOR) 40 MG tablet  Take 40 mg by mouth Daily.             cetirizine (zyrTEC) 10 MG tablet  Take 10 mg by mouth Daily.             fluticasone (FLONASE) 50 MCG/ACT nasal spray  2 sprays into each nostril Daily.             furosemide (LASIX) 40 MG tablet  Take 40 mg by mouth Daily.             metoprolol tartrate (LOPRESSOR) 50 MG tablet  Take 50 mg by mouth Every Night. Note: Not on file at pharmacy, but patient reports this is the dose he takes.             metoprolol tartrate (LOPRESSOR) 50 MG tablet  Take 25 mg by mouth Every Morning. Note: Not on file at pharmacy, but patient reports this is the dose he takes.             O2 (OXYGEN)  Inhale 2  L/min 1 (One) Time. As needed             omeprazole (priLOSEC) 20 MG capsule  Take 20 mg by mouth Daily.             Potassium (POTASSIMIN PO)  Take 10 mEq by mouth Daily.             valACYclovir (VALTREX) 500 MG tablet  Take 500 mg by mouth 2 (Two) Times a Day As Needed (For fever blisters).             warfarin (COUMADIN) 2.5 MG tablet  Take 2.5 mg by mouth See Admin Instructions. Daily or as directed by physician. Pharmacy has dose as 1.5 tabs daily.                 Discharge Instructions:  · No heavy lifting, pushing, pulling greater than 10 pounds.  · No driving up until 2 weeks after surgery and no longer taking narcotics.  · Continue stage IV dysphasia diet  · Continue incentive spirometer at least 4 times per day.  · Remove dressing from post chest tube site after 48 hours, may shower and clean surgical sites with antibacterial soap or hydrogen peroxide, and apply gauze dressing or band-aid as needed for any drainage.  No dressing needed once no longer draining.          Follow-up Appointments  Future Appointments  Date Time Provider Department Center   5/31/2018 9:30 AM NARINDER Zaidi [facility] None      Patient will be seen in my office in 6 weeks with PA and lateral chest x-ray.  My office will contact his wife to make arrangements.    Test Results Pending at Discharge   Order Current Status    AFB Culture - Tissue, Pleura Preliminary result    Fungus Culture - Tissue, Pleura Preliminary result          For any questions regarding patient's stay, please refer to patient's chart.    Erich Serrano III, MD  [facility]  05/03/18  3:48 PM

## 2018-05-03 NOTE — NURSING NOTE
Pt currently sleeping. Talked with his RN, Corrie and she says he has been calm, no mention of SI, probable d/c to [facility] later today.

## 2018-05-03 NOTE — SIGNIFICANT NOTE
05/03/18 1409   Rehab Treatment   Discipline occupational therapist   Reason Treatment Not Performed patient/family declined treatment  ([facility]. Declines OT treatment. 1320)

## 2018-05-03 NOTE — PROGRESS NOTES
Continued Stay Note   [facility]     Patient Name: Harry Ventura  MRN: [id number]  Today's Date: 5/3/2018    Admit Date: 4/20/2018          Discharge Plan     Row Name 05/03/18 1456       Plan    Plan James    Patient/Family in Agreement with Plan yes    Plan Comments Pt accepted to [facility] rehab.  Bed available today.   Family to transport.  FRANKLIN Verduzco RN              Discharge Codes    No documentation.       Expected Discharge Date and Time     Expected Discharge Date Expected Discharge Time    May 3, 2018             Arely Verduzco

## 2018-05-03 NOTE — DISCHARGE INSTRUCTIONS
[facility]   [address]  [address]  [place] [address]   P: [phone]  F: [phone]    Discharge Instructions:    1. Activity:  • Climb stairs as tolerated  • May drive car after 2 weeks or as directed by Physician  • Walk at least 3-4 times a day  • Limit lifting for first 6 weeks  • Resume sexual activity after 2 weeks    2. Nutrition:  • Resume previous diet  • Eat well balanced meals  • Avoid constipation by eating fresh fruits, vegetables, whole grain foods and increasing fluid intake.    3. Incision (wound) Care:  • Remove dressing after 48 hours  • If continued drainage, change dressing every 24 hours and as needed.  • May shower in 48 hrs.   • Wash around incision area with soap and water daily.  • No lotions or creams on incision area.  • Take temperature daily for the first week after discharge.    4. When to call for Medical Advice:  • Fever greater than 101.2 degrees  • Unusual or severe pain  • Difficulty breathing  • Incision changes (reddens, swelling or increased drainage)  • Any questions or problems    5. Medication Instructions:  • Take Pain medications as directed to stay comfortable  • No driving or drinking alcohol when taking prescribed pain meds  • Laxative of choice if needed for constipation.    6. Medication and dosages:     See discharge medication instruction form

## 2018-05-03 NOTE — PLAN OF CARE
Problem: Patient Care Overview  Goal: Plan of Care Review  Outcome: Ongoing (interventions implemented as appropriate)   05/03/18 1157   Coping/Psychosocial   Plan of Care Reviewed With patient   OTHER   Outcome Summary Pt tolerated ambulation well. Less assist w/ transfers today. Possible d/c to rehab later today.

## 2018-05-03 NOTE — THERAPY TREATMENT NOTE
Acute Care - Physical Therapy Treatment Note   [facility]     Patient Name: Harry Ventura  : 1930  MRN: [id number]  Today's Date: 5/3/2018     Date of Referral to PT: 18       Admit Date: 2018    Visit Dx:    ICD-10-CM ICD-9-CM   1. Weakness generalized R53.1 780.79   2. Pleural effusion J90 511.9   3. 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   • Anemia of chronic renal failure, stage 3 (moderate)   • Thrombocytopenia   • Vitamin B12 deficiency   • Coronary artery disease involving native coronary artery of native heart without angina pectoris   • TIA (transient ischemic attack)   • Pacemaker   • Pulmonary hypertension   • Congestive heart failure   • Viral infection   • Pleural effusion, right   • Chronic obstructive pulmonary disease (COPD)   • Pleural effusion   • Confusion, postoperative       Therapy Treatment          Rehabilitation Treatment Summary     Row Name 18 1157             Treatment Time/Intention    Discipline physical therapy assistant  -RW      Document Type therapy note (daily note)  -RW      Subjective Information no complaints  -RW      Patient Effort good  -RW      Existing Precautions/Restrictions fall  -RW      Recorded by [RW] nAn-Marie Booth, PTA 18 1200 18 1200      Row Name 18 1159             Cognitive Assessment/Intervention- PT/OT    Orientation Status (Cognition) oriented x 4  -RW      Follows Commands  (Cognition) WNL  -RW      Personal Safety Interventions fall prevention program maintained;gait belt;supervised activity  -RW      Recorded by [RW] Ann-Marie Booth, [facility] 05/03/18 1200 05/03/18 1200      Row Name 05/03/18 1157             Safety Issues, Functional Mobility    Safety Issues Affecting Function (Mobility) at risk behavior observed;awareness of need for assistance;safety precaution awareness  -RW      Impairments Affecting Function (Mobility) balance;endurance/activity tolerance;strength  -RW      Recorded by [RW] Ann-Marie Booth, [facility] 05/03/18 1200 05/03/18 1200      Row Name 05/03/18 1157             Bed Mobility Assessment/Treatment    Supine-Sit [place] (Bed Mobility) not tested  -RW      Sit-Supine [place] (Bed Mobility) not tested  -RW      Comment (Bed Mobility) Pt up in chair  -RW      Recorded by [RW] Ann-Marie Booth, [facility] 05/03/18 1200 05/03/18 1200      Row Name 05/03/18 1157             Transfer Assessment/Treatment    Transfer Assessment/Treatment sit-stand transfer;stand-sit transfer  -RW      Comment (Transfers) Cueing required for hand placement  -RW      Sit-Stand [place] (Transfers) contact guard;verbal cues  -RW      Stand-Sit [place] (Transfers) contact guard;verbal cues  -RW      Recorded by [RW] Ann-Marie Booth, [facility] 05/03/18 1200 05/03/18 1200      Row Name 05/03/18 1157             Sit-Stand Transfer    Assistive Device (Sit-Stand Transfers) walker, front-wheeled  -RW      Recorded by [RW] Ann-Marie Booth, PTA 05/03/18 1200 05/03/18 1200      Row Name 05/03/18 1157             Stand-Sit Transfer    Assistive Device (Stand-Sit Transfers) walker, front-wheeled  -RW      Recorded by [RW] Ann-Marie Booth, [facility] 05/03/18 1200 05/03/18 1200      Row Name 05/03/18 1157             Gait/Stairs Assessment/Training    [place] Level (Gait) contact guard;verbal cues  -RW      Assistive Device (Gait) walker, front-wheeled  -RW      Distance in Feet (Gait) 80  -RW       Pattern (Gait) step-through  -RW      Deviations/Abnormal Patterns (Gait) jocelyn decreased;gait speed decreased;stride length decreased  -RW      Bilateral Gait Deviations forward flexed posture  -RW      Comment (Gait/Stairs) Cues for posture correction  -RW      Recorded by [RW] Ann-Marie Booth PTA 05/03/18 1200 05/03/18 1200      Row Name 05/03/18 1157             Positioning and Restraints    Pre-Treatment Position sitting in chair/recliner  -RW      Post Treatment Position chair  -RW      In Chair sitting;call light within reach;encouraged to call for assist;exit alarm on  -RW      Recorded by [RW] Ann-Marie Booth PTA 05/03/18 1200 05/03/18 1200      Row Name 05/03/18 1157             Pain Scale: Numbers Pre/Post-Treatment    Pain Scale: Numbers, Pretreatment 0/10 - no pain  -RW      Recorded by [RW] Ann-Marie Booth PTA 05/03/18 1200 05/03/18 1200      Row Name                Wound 04/20/18 0839 Right upper;other (see comments) chest incision    Wound - Properties Group Date first assessed: 04/20/18 [SOPHY] Time first assessed: 0839 [SOPHY] Side: Right [SOPHY] Orientation: upper;other (see comments) [JR], midaxillary  Location: chest [SOPHY] Type: incision [SOPHY] Additional Comments: s/p chest tube site. [JR] Recorded by:  [SOPHY] Jordyn Culver RN 04/20/18 0839 04/20/18 0839 [JR] Jose Manuel Grant RN 04/26/18 2139 04/26/18 2139    Row Name                Wound 04/26/18 2036 Right lower;other (see comments) chest incision    Wound - Properties Group Date first assessed: 04/26/18 [JR] Time first assessed: 2036 [JR] Side: Right [JR] Orientation: lower;other (see comments) [JR], midaxillary  Location: chest [JR] Type: incision [JR] Additional Comments: s/p chest tube site [JR] Recorded by:  [JR] Jose Manuel Grant RN 04/26/18 2141 04/26/18 2141    Row Name 05/03/18 1157             Outcome Summary/Treatment Plan (PT)    Anticipated Discharge Disposition (PT) inpatient rehab facility  -RW      Recorded by [RW] Ann-Marie Booth PTA 05/03/18  1200 05/03/18 1200        User Key  (r) = Recorded By, (t) = Taken By, (c) = Cosigned By    Initials Name Effective Dates Discipline    SOPHYERICA Culver RN 02/18/16 -  Nurse    AMAURI Booth, JCARLOS 03/07/18 -  PT    JR Jose Manuel Grant RN 06/23/17 -  Nurse          Wound 04/20/18 0839 Right upper;other (see comments) chest incision (Active)   Dressing Appearance open to air 5/3/2018  8:57 AM   Base dry;clean;pink 5/3/2018  8:57 AM   Periwound intact 5/3/2018 12:00 AM   Periwound Temperature warm 5/3/2018 12:00 AM   Periwound Skin Turgor soft 5/3/2018 12:00 AM   Drainage Amount none 5/3/2018  8:57 AM       Wound 04/26/18 2036 Right lower;other (see comments) chest incision (Active)   Dressing Appearance open to air 5/3/2018  8:57 AM   Base clean;dry;pink 5/3/2018  8:57 AM   Drainage Amount none 5/3/2018  8:57 AM             Physical Therapy Education     Title: PT OT SLP Therapies (Active)     Topic: Physical Therapy (Active)     Point: Mobility training (Done)    Learning Progress Summary     Learner Status Readiness Method Response Comment Documented by    Patient Done Acceptance E VU,NR  RW 05/03/18 1158     Active Acceptance E,D NR   05/02/18 1409     Done Acceptance E,TB VU,DU  CW 04/30/18 1421     Done Acceptance E VU  MA 04/28/18 1545     Done Acceptance E,TB,D VU,NR  RW 04/27/18 1353     Done Acceptance E VU,NR  RW 04/26/18 1552     Done Acceptance E,TB DU,VU  PV 04/25/18 1135     Done Acceptance E,TB VU,DU,NR  PV 04/24/18 1416     Done Acceptance E,TB VU,DU,NR  PV 04/23/18 1106     Done Acceptance E VU  AL 04/21/18 1101          Point: Home exercise program (Active)    Learning Progress Summary     Learner Status Readiness Method Response Comment Documented by    Patient Active Acceptance E,D NR  PC 05/02/18 1409     Done Acceptance E,TB VU,DU  CW 04/30/18 1421     Done Acceptance E VU  MA 04/28/18 1545     Done Acceptance E,TB DU,VU  PV 04/25/18 1135     Done Acceptance E,TB VU,DU,NR  PV 04/24/18 1418      Done Acceptance E,TB VU,DU,NR  PV 04/23/18 1106          Point: Body mechanics (Done)    Learning Progress Summary     Learner Status Readiness Method Response Comment Documented by    Patient Done Acceptance E VU,NR   05/03/18 1158     Active Acceptance E,D NR   05/02/18 1409     Done Acceptance E,TB VU,DU   04/30/18 1421     Done Acceptance E VU  MA 04/28/18 1545     Done Acceptance E,TB,D VU,NR   04/27/18 1353     Done Acceptance E VU,NR   04/26/18 1552     Done Acceptance E,TB DU,VU  PV 04/25/18 1135     Done Acceptance E,TB VU,DU,NR  PV 04/24/18 1416     Done Acceptance E,TB VU,DU,NR  PV 04/23/18 1106     Done Acceptance E VU  AL 04/21/18 1101          Point: Precautions (Done)    Learning Progress Summary     Learner Status Readiness Method Response Comment Documented by    Patient Done Acceptance E VU,NR   05/03/18 1158     Active Acceptance E,D NR   05/02/18 1409     Done Acceptance E,TB VU,DU   04/30/18 1421     Done Acceptance E VU  MA 04/28/18 1545     Done Acceptance E,TB,D VU,NR   04/27/18 1353     Done Acceptance E VU,NR   04/26/18 1552     Done Acceptance E,TB DU,VU  PV 04/25/18 1135     Done Acceptance E,TB VU,DU,NR  PV 04/24/18 1416     Done Acceptance E,TB VU,DU,NR  PV 04/23/18 1106     Done Acceptance E VU  AL 04/21/18 1101                      User Key     Initials Effective Dates Name Provider Type Discipline     04/03/18 -  Allyn Decker, PT Physical Therapist PT    AL 04/03/18 -  Ricarda Adams, PT Physical Therapist PT     03/07/18 -  Ann-Marie Booth, PTA Physical Therapy Assistant PT    MA 04/03/18 -  Dayanara Thompson, PT Physical Therapist PT     03/07/18 -  Edenilson Ta, PTA Physical Therapy Assistant PT    PV 03/07/18 -  Balbir Shin, PT Student PT Student PT                    PT Recommendation and Plan  Anticipated Discharge Disposition (PT): inpatient rehab facility  Outcome Summary/Treatment Plan (PT)  Anticipated Discharge Disposition (PT): inpatient  rehab facility  Plan of Care Reviewed With: patient  Outcome Summary: Pt tolerated ambulation well. Less assist w/ transfers today. Possible d/c to rehab later today.          Outcome Measures     Row Name 05/03/18 1156 05/02/18 1545 05/02/18 1400       How much help from another person do you currently need...    Turning from your back to your side while in flat bed without using bedrails? 4  -RW  -- 4  -PC    Moving from lying on back to sitting on the side of a flat bed without bedrails? 4  -RW  -- 4  -PC    Moving to and from a bed to a chair (including a wheelchair)? 3  -RW  -- 3  -PC    Standing up from a chair using your arms (e.g., wheelchair, bedside chair)? 3  -RW  -- 3  -PC    Climbing 3-5 steps with a railing? 2  -RW  -- 2  -PC    To walk in hospital room? 3  -RW  -- 3  -PC    AM-PAC 6 Clicks Score 19  -RW  -- 19  -PC       How much help from another is currently needed...    Putting on and taking off regular lower body clothing?  -- 3  -KP  --    Bathing (including washing, rinsing, and drying)  -- 3  -KP  --    Toileting (which includes using toilet bed pan or urinal)  -- 3  -KP  --    Putting on and taking off regular upper body clothing  -- 3  -KP  --    Taking care of personal grooming (such as brushing teeth)  -- 3  -KP  --    Eating meals  -- 3  -KP  --    Score  -- 18  -KP  --       Functional Assessment    Outcome Measure Options AM-PAC 6 Clicks Basic Mobility (PT)  -RW -PAC 6 Clicks Daily Activity (OT)  -KP  --    Row Name 04/30/18 1400             How much help from another person do you currently need...    Turning from your back to your side while in flat bed without using bedrails? 4  -CW      Moving from lying on back to sitting on the side of a flat bed without bedrails? 4  -CW      Moving to and from a bed to a chair (including a wheelchair)? 3  -CW      Standing up from a chair using your arms (e.g., wheelchair, bedside chair)? 3  -CW      Climbing 3-5 steps with a railing? 2  -CW       To walk in hospital room? 3  -CW      AM-PAC 6 Clicks Score 19  -CW         Functional Assessment    Outcome Measure Options AM-PAC 6 Clicks Basic Mobility (PT)  -CW        User Key  (r) = Recorded By, (t) = Taken By, (c) = Cosigned By    Initials Name Provider Type    XIOMY Ng, OTR Occupational Therapist    PC Allyn Decker, PT Physical Therapist    RW Ann-Marie Booth, JCARLOS Physical Therapy Assistant    VIRA Ta, PTA Physical Therapy Assistant           Time Calculation:         PT Charges     Row Name 05/03/18 1156             Time Calculation    Start Time 1152  -RW      Stop Time 1200  -RW      Time Calculation (min) 8 min  -RW      PT Received On 05/03/18  -RW      PT - Next Appointment 05/04/18  -        User Key  (r) = Recorded By, (t) = Taken By, (c) = Cosigned By    Initials Name Provider Type    RW Ann-Marie Booth, JCARLOS Physical Therapy Assistant          Therapy Charges for Today     Code Description Service Date Service Provider Modifiers Qty    22691702556 HC PT THER PROC EA 15 MIN 5/3/2018 Ann-Marie Booth PTA GP 1          PT G-Codes  Outcome Measure Options: AM-PAC 6 Clicks Basic Mobility (PT)    Ann-Marie Booth PTA  5/3/2018

## 2018-05-03 NOTE — NURSING NOTE
Discussed with Dr. Ibarra and Cony ([facility]).  Plan is definitely for [facility].  Will sign off.  Thank you--Marbella Chao,  Rehab Adm Nurse

## 2018-05-10 ENCOUNTER — PATIENT OUTREACH (OUTPATIENT)
Dept: CASE MANAGEMENT | Facility: OTHER | Age: 83
End: 2018-05-10

## 2018-05-14 ENCOUNTER — HOSPITAL ENCOUNTER (OUTPATIENT)
Dept: CARDIOLOGY | Facility: HOSPITAL | Age: 83
Setting detail: RECURRING SERIES
Discharge: HOME | End: 2018-05-14

## 2018-05-14 PROCEDURE — 85610 PROTHROMBIN TIME: CPT

## 2018-05-14 PROCEDURE — 36416 COLLJ CAPILLARY BLOOD SPEC: CPT

## 2018-05-16 ENCOUNTER — EPISODE CHANGES (OUTPATIENT)
Dept: CASE MANAGEMENT | Facility: OTHER | Age: 83
End: 2018-05-16

## 2018-05-18 LAB — FUNGUS WND CULT: NORMAL

## 2018-05-21 ENCOUNTER — HOSPITAL ENCOUNTER (OUTPATIENT)
Dept: CARDIOLOGY | Facility: HOSPITAL | Age: 83
Setting detail: RECURRING SERIES
Discharge: HOME | End: 2018-05-21

## 2018-05-21 PROCEDURE — 85610 PROTHROMBIN TIME: CPT

## 2018-05-21 PROCEDURE — 36416 COLLJ CAPILLARY BLOOD SPEC: CPT

## 2018-05-25 ENCOUNTER — HOSPITAL ENCOUNTER (OUTPATIENT)
Dept: CARDIOLOGY | Facility: HOSPITAL | Age: 83
Setting detail: RECURRING SERIES
Discharge: HOME | End: 2018-05-25

## 2018-05-25 ENCOUNTER — CLINICAL SUPPORT NO REQUIREMENTS (OUTPATIENT)
Dept: CARDIOLOGY | Facility: CLINIC | Age: 83
End: 2018-05-25

## 2018-05-25 DIAGNOSIS — I48.20 CHRONIC ATRIAL FIBRILLATION (HCC): Primary | ICD-10-CM

## 2018-05-25 PROCEDURE — 93279 PRGRMG DEV EVAL PM/LDLS PM: CPT | Performed by: INTERNAL MEDICINE

## 2018-05-25 PROCEDURE — 36416 COLLJ CAPILLARY BLOOD SPEC: CPT

## 2018-05-25 PROCEDURE — 85610 PROTHROMBIN TIME: CPT

## 2018-05-31 ENCOUNTER — OFFICE VISIT (OUTPATIENT)
Dept: CARDIOLOGY | Facility: CLINIC | Age: 83
End: 2018-05-31

## 2018-05-31 ENCOUNTER — HOSPITAL ENCOUNTER (OUTPATIENT)
Dept: CARDIOLOGY | Facility: HOSPITAL | Age: 83
Setting detail: RECURRING SERIES
Discharge: HOME | End: 2018-05-31

## 2018-05-31 VITALS
HEART RATE: 77 BPM | SYSTOLIC BLOOD PRESSURE: 120 MMHG | BODY MASS INDEX: 22.91 KG/M2 | DIASTOLIC BLOOD PRESSURE: 80 MMHG | WEIGHT: 146 LBS | HEIGHT: 67 IN

## 2018-05-31 DIAGNOSIS — I10 ESSENTIAL HYPERTENSION: ICD-10-CM

## 2018-05-31 DIAGNOSIS — I48.20 CHRONIC ATRIAL FIBRILLATION (HCC): ICD-10-CM

## 2018-05-31 DIAGNOSIS — I25.10 CORONARY ARTERY DISEASE INVOLVING NATIVE CORONARY ARTERY OF NATIVE HEART WITHOUT ANGINA PECTORIS: ICD-10-CM

## 2018-05-31 DIAGNOSIS — I27.20 PULMONARY HYPERTENSION (HCC): ICD-10-CM

## 2018-05-31 DIAGNOSIS — Z95.0 PACEMAKER: ICD-10-CM

## 2018-05-31 DIAGNOSIS — C95.10 CHRONIC LEUKEMIA OF UNSPECIFIED CELL TYPE NOT HAVING ACHIEVED REMISSION (HCC): ICD-10-CM

## 2018-05-31 DIAGNOSIS — Z51.81 THERAPEUTIC DRUG MONITORING: Primary | ICD-10-CM

## 2018-05-31 DIAGNOSIS — I50.33 ACUTE ON CHRONIC DIASTOLIC CONGESTIVE HEART FAILURE (HCC): ICD-10-CM

## 2018-05-31 PROCEDURE — 36416 COLLJ CAPILLARY BLOOD SPEC: CPT

## 2018-05-31 PROCEDURE — 85610 PROTHROMBIN TIME: CPT

## 2018-05-31 PROCEDURE — 93000 ELECTROCARDIOGRAM COMPLETE: CPT | Performed by: NURSE PRACTITIONER

## 2018-05-31 PROCEDURE — 99214 OFFICE O/P EST MOD 30 MIN: CPT | Performed by: NURSE PRACTITIONER

## 2018-05-31 RX ORDER — FUROSEMIDE 20 MG/1
TABLET ORAL
Qty: 60 TABLET | Refills: 11 | Status: SHIPPED | OUTPATIENT
Start: 2018-05-31 | End: 2018-06-05 | Stop reason: SDUPTHER

## 2018-05-31 NOTE — PROGRESS NOTES
Date of Office Visit: 2018  Encounter Provider: NARINDER Zaidi  Place of Service: [facility] CARDIOLOGY  Patient Name: Harry Ventura  :1930    Chief Complaint   Patient presents with   • Congestive Heart Failure   • Shortness of Breath   :     HPI: Harry Ventura is a 87 y.o. male is a patient of Dr. Juarez. I am seeing him today and have reviewed the records.     His past medical history is significant of atrial fibrillation,coronary artery disease, chronic diastolic congestive heart failure, pulmonary hypertension, hypertension, hyperlipidemia,sick sinus syndrome status post PPM, recurrent right pleural effusion s/p thoracentesis, alcohol dependence, and chronic lymphocytic leukemia.    In  he was admitted with medical noncompliance and dietary noncompliance with heart failure.  He was noted to have pulmonary hypertension 2015 in the setting of diastolic dysfunction with an RVSP of 67 mmHg.    He had a transthoracic echocardiogram in 2016 which showed normal left ventricular systolic function, mild aortic regurgitation, mild-to-moderate tricuspid regurgitation, mild mitral regurgitation and RVSP of 68 mmHg.  His last perfusion stress test was in May 2016 that showed no evidence of ischemia with a normal left ventricular ejection fraction of 62%.  He was taken to the catheterization lab on 2016 for pulmonary hypertension to have a right and left heart cath.  The right heart catheterization revealed significant diastolic dysfunction and elevated biventricular pressures. He had normal left ventricular systolic function, mild coronary disease, mild pulmonary hypertension and was to be managed medically with diuretic.    He was admitted in 2017 with facial droop.  He was diagnosed with TIA.  His INR was subtherapeutic at 1.2 and he was given Lovenox until his INR became therapeutic.  Therapy was added to his regimen.  Remained on Coumadin due to  financial burden of Eliquis which was recommended.  At another echocardiogram that showed normal systolic function, mild mitral regurgitation, trivial to mild aortic valve regurgitation and no evidence of intra--atrial shunting with saline injection.  His creatinine was slightly elevated at 1.3 with a GFR of 52.      He was last seen in the office on 10/24/2017.  At that time he complained of chronic dyspnea on exertion.  He had some hematemesis due to pulmonary hypertension.  He had recently seen Dr. Yu had a walking test in his office.  He was not instructed to go on oxygen therapy.  Device was interrogated on that day revealed atrial fibrillation with ventricular pacing 62% of the time due to high ventricular rates with episodes lasting 14 and 17 seconds in duration. He had a pro BNP that was elevated at 3545.  He was recommended to increase his Lasix to 40 mg twice a day along with potassium twice a day.  He was hesitant to increase Lasix as he already has accident on 40 mg daily.    He was admitted to the hospital on April 20 for a right VAt with Dr. Serrano.  He tolerated tolerated surgery well without complication.  He was extubated in recovery and transferred to intensive care for close observation.  On his second postoperative night became very confused and disoriented.  There was some suspicion for alcohol withdrawal and he was placed on withdrawal protocol.  He later was transferred to the telemetry abrams with physical therapy as he was quite weak.  Because of the confusion and weakness was difficult for him to eat without aspiration.  With the confusion he pulled out his feeding tube and was evaluated by speech therapy.  All chest tubes were removed by the fifth postoperative day.  His chest x-rays remained stable.  He was evaluated by cardiology due to chronic atrial fibrillation and chronic anticoagulation.  He developed some hypertension and was started on losartan.  His BNP was elevated.  He is  "maintained on oral diuretic.  He was discharged on 5/3/2018 to be followed by [facility] for speech and occupational therapy.    He presents today for hospital follow-up.  He denies palpitations, chest pain, edema,  lightheadedness, near syncope, syncope, or fatigue.  He still has some shortness of breath that has seen some improvement since his procedure.  He states that the shortness of breath varies day-to-day.  He has some dizziness if he gets up too fast although this does not have to and often and it is improved when he rises more slowly.  He occasionally exercises and walks around the grocery store for 20-30 minutes.  He states \"I need to exercise more\".  He continues to drink 2-3 vodka and tonic daily. He quit smoking at age 40. He has been  for 25 years and his wife is a retired nurse.   He has a follow-up with Dr. Serrano in 2 weeks.    No Known Allergies    Past Medical History:   Diagnosis Date   • Accelerated essential hypertension    • Anemia    • Atrial fibrillation    • CAP (community acquired pneumonia)    • CHF (congestive heart failure)    • Chronic anticoagulation    • Chronic diastolic CHF (congestive heart failure)    • CLL (chronic lymphocytic leukemia)     Stage 0   • Coronary artery disease    • ED (erectile dysfunction) of non-organic origin    • Gait instability    • GERD (gastroesophageal reflux disease)    • Glucose intolerance (malabsorption)    • Hematuria    • History of thrombocytopenia     Mild   • Hypercholesterolemia    • Hypertension     Pulmonary   • Hypogonadism male    • Insomnia    • Iron overload    • Low back pain    • Macrocytosis    • Pacemaker    • Personal history of CLL (chronic lymphocytic leukemia)    • Personal history of venous thrombosis and embolism    • Pleural effusion    • Renal insufficiency    • Seborrheic dermatitis    • SSS (sick sinus syndrome)    • TIA (transient ischemic attack) 2017       Past Surgical History:   Procedure Laterality Date   • " "CARDIAC CATHETERIZATION N/A 6/17/2016    Procedure: Right and Left Heart Cath;  Surgeon: Harry Bolton MD;  Location: [facility] INVASIVE LOCATION;  Service:    • CARDIAC CATHETERIZATION N/A 6/17/2016    Procedure: Coronary angiography;  Surgeon: Harry Bolton MD;  Location: [facility] INVASIVE LOCATION;  Service:    • CARDIAC CATHETERIZATION N/A 6/17/2016    Procedure: Left Heart Cath;  Surgeon: Harry Bolton MD;  Location: [facility] CATH INVASIVE LOCATION;  Service:    • CARDIAC CATHETERIZATION N/A 6/17/2016    Procedure: Left ventriculography;  Surgeon: Harry Bolton MD;  Location: [facility] CATH INVASIVE LOCATION;  Service:    • CHOLECYSTECTOMY  1983   • PACEMAKER IMPLANTATION     • THORACOSCOPY Right 4/20/2018    Procedure: BRONCHOSCOPY, RIGHT VIDEO ASSISTED THORACOSCOPY, RIGHT LUNG DECORTICATION WITH PLEURAL BIOPSY, PLEURODESIS;  Surgeon: Eirch Serrano III, MD;  Location: [facility];  Service: Cardiothoracic   • UMBILICAL HERNIA REPAIR  2006    Performed laparoscopically by Dr. Garcia.         Family and social history reviewed.     Review of Systems   Cardiovascular: Positive for dyspnea on exertion.   Respiratory: Positive for cough.    Genitourinary: Positive for frequency. Bladder incontinence: occassional incontinence.   Neurological: Positive for dizziness.     All other systems were reviewed and are negative          Objective:     Vitals:    05/31/18 0926   BP: 120/80   BP Location: Right arm   Pulse: 77   Weight: 66.2 kg (146 lb)   Height: 170.2 cm (67\")     Body mass index is 22.87 kg/m².    PHYSICAL EXAM:  Physical Exam   Constitutional: He is oriented to person, place, and time. He appears well-developed and well-nourished. No distress.   HENT:   Head: Normocephalic.   Eyes: Conjunctivae are normal.   glasses   Neck: Normal range of motion. No JVD present.   Cardiovascular: Normal rate, normal heart sounds and intact distal pulses.  An irregular rhythm present.   No murmur heard.  Pulses:       Carotid " pulses are 2+ on the right side, and 2+ on the left side.       Radial pulses are 2+ on the right side, and 2+ on the left side.        Posterior tibial pulses are 2+ on the right side, and 2+ on the left side.   Pulmonary/Chest: Effort normal. No respiratory distress. He has decreased breath sounds in the right lower field. He has no wheezes. He has no rhonchi. He has rales. He exhibits no tenderness.   RLL crackles.   Abdominal: Soft. Bowel sounds are normal. He exhibits no distension.   Musculoskeletal: Normal range of motion. He exhibits no edema.   cane   Neurological: He is alert and oriented to person, place, and time.   Skin: Skin is warm, dry and intact. No rash noted. He is not diaphoretic. No cyanosis.   Psychiatric: He has a normal mood and affect. His behavior is normal. Judgment and thought content normal.         ECG 12 Lead  Date/Time: 5/31/2018 9:35 AM  Performed by: NICKOLAS PAGE  Authorized by: NICKOLAS PAGE   Comparison: compared with previous ECG from 4/23/2018  Similar to previous ECG  Rhythm: atrial fibrillation  Rate: normal  BPM: 77  QRS axis: normal  Clinical impression: abnormal ECG  Comments: Non specific T abnormalities            Current Outpatient Prescriptions   Medication Sig Dispense Refill   • aspirin 81 MG EC tablet Take 81 mg by mouth Daily.     • atorvastatin (LIPITOR) 40 MG tablet Take 40 mg by mouth Daily.     • cetirizine (zyrTEC) 10 MG tablet Take 10 mg by mouth Daily.     • fluticasone (FLONASE) 50 MCG/ACT nasal spray 2 sprays into each nostril Daily.     • furosemide (LASIX) 20 MG tablet Take 1 and 1/2 tablet in am and afternoon for total on 30 mg BID 60 tablet 11   • metoprolol tartrate (LOPRESSOR) 50 MG tablet Take 50 mg by mouth Every Night. Note: Not on file at pharmacy, but patient reports this is the dose he takes.     • metoprolol tartrate (LOPRESSOR) 50 MG tablet Take 25 mg by mouth Every Morning. Note: Not on file at pharmacy, but patient reports this is the dose  he takes.     • O2 (OXYGEN) Inhale 2 L/min 1 (One) Time. As needed     • omeprazole (priLOSEC) 20 MG capsule Take 20 mg by mouth Daily.     • Potassium (POTASSIMIN PO) Take 10 mEq by mouth Daily.     • valACYclovir (VALTREX) 500 MG tablet Take 500 mg by mouth 2 (Two) Times a Day As Needed (For fever blisters).     • warfarin (COUMADIN) 2.5 MG tablet Take 2.5 mg by mouth See Admin Instructions. Daily or as directed by physician. Pharmacy has dose as 1.5 tabs daily.       No current facility-administered medications for this visit.      Assessment:       Diagnosis Plan   1. Therapeutic drug monitoring  Basic Metabolic Panel   2. Chronic atrial fibrillation  ECG 12 Lead   3. Acute on chronic diastolic congestive heart failure  ECG 12 Lead   4. Coronary artery disease involving native coronary artery of native heart without angina pectoris  ECG 12 Lead   5. Chronic leukemia of unspecified cell type not having achieved remission     6. Pacemaker  ECG 12 Lead   7. Pulmonary hypertension     8. Essential hypertension          Orders Placed This Encounter   Procedures   • Basic Metabolic Panel     Standing Status:   Future     Standing Expiration Date:   6/1/2019   • ECG 12 Lead     This order was created via procedure documentation         Plan:     1.  Acute on chronic diastolic heart failure-stable oral diuretic 40 mg Lasix daily.  Ejection fraction 63% in June 2017.  His BNP  On 4/24/2018 was 4,322. He has been resistant to increasing his diuretic as he has frequency and occasional accidents.  He has agreed to double his diuretic  (40mg BID) for 2 days (sat and Sunday)  And then be maintained on 30 mg BID starting monday until he is re-evaluated in 6 weeks by Dr. Juarez. BMP in 2 weeks when he comes for follow up chest Xray.    2. Recurrent right pleural effusion status post VAT on 4/20/2018- followed by Dr. Serrano. Repeat chest Xray on 6/18/2018 prior to the office follow up visit.    3.  Persistent atrial fibrillation,  rate controlled.  He is anticoagulated with warfarin with INRs checked  Here at our office.    Atrial Fibrillation and Atrial Flutter  Assessment  • The patient has persistent atrial fibrillation  • This is non-valvular in etiology  • The patient's CHADS2-VASc score is 4  • A [id number] score of 2 or more is considered a high risk for a thromboembolic event  • Warfarin prescribed  • Dabigatran not prescribed  • Rivaroxaban not prescribed  • Apixaban not prescribed  • Aspirin not prescribed    Plan  • Continue in atrial fibrillation with rate control  • Continue warfarin for antithrombotic therapy, bleeding issues discussed  • Continue beta blocker for rhythm control    4.  Sick sinus syndrome status post permanent pacemaker- next check in office on 8/28/2018    5.  Pulmonary hypertension-RVSP 49 mmHg in June 2017     6.  History of wide complex arrhythmia with ST-T wave changes-no recurrence    7.  Hypertension-blood pressure 120/80.  He is currently not taking losartan.  He has maintained on metoprolol  Tartrate 25 mg in the morning and 50 mg at night.    8. Alcohol dependence/abuse-he drinks 2-3 drinks of vodka and tonic daily.  He had some postop confusion that was suspected to be related to alcohol withdrawal as it improved with alcohol withdrawal protocol treatment    9.  Dysphasia in the setting of confusion-he is participating and speech therapy at [facility] but does not think that he requires the services and is considering canceling his last 2 appointments    10.  Mitral regurgitation- mild on echo in 01/2016    11. CLL- followed by CBC    12. COPD/Pulmonary Hypertension- followed by pulmonology    13. Mild coronary artery disease on catheterization in 06/2016  Coronary Artery Disease  Assessment  • The patient has no angina    Plan  • Lifestyle modifications discussed include avoidance of tobacco products, medication compliance and regular exercise    Subjective - Objective  • Current antiplatelet therapy  includes aspirin 81 mg      15.  History of TIA- on ASA and atorvastatin    16. History of renal insufficiency    Follow up in 6 weeks with Dr. Juarez    Patient was instructed to call the office if new symptoms develop or report to nearest ER if heart attack or stroke is suspected.        It has been a pleasure to participate in this patient's care.      Thank you,  NARINDER Zaidi      **Ros Disclaimer:**  Much of this encounter note is an electronic transcription/translation of spoken language to printed text. The electronic translation of spoken language may permit erroneous, or at times, nonsensical words or phrases to be inadvertently transcribed. Although I have reviewed the note for such errors, some may still exist.

## 2018-06-01 DIAGNOSIS — J90 PLEURAL EFFUSION: Primary | ICD-10-CM

## 2018-06-01 LAB
MYCOBACTERIUM SPEC CULT: NORMAL
NIGHT BLUE STAIN TISS: NORMAL

## 2018-06-04 ENCOUNTER — EPISODE CHANGES (OUTPATIENT)
Dept: CASE MANAGEMENT | Facility: OTHER | Age: 83
End: 2018-06-04

## 2018-06-05 ENCOUNTER — TELEPHONE (OUTPATIENT)
Dept: CARDIOLOGY | Facility: CLINIC | Age: 83
End: 2018-06-05

## 2018-06-05 RX ORDER — FUROSEMIDE 20 MG/1
TABLET ORAL
Qty: 90 TABLET | Refills: 3 | Status: SHIPPED | OUTPATIENT
Start: 2018-06-05 | End: 2018-10-30 | Stop reason: DRUGHIGH

## 2018-06-05 RX ORDER — FUROSEMIDE 40 MG/1
40 TABLET ORAL EVERY MORNING
Qty: 90 TABLET | Refills: 3 | Status: SHIPPED | OUTPATIENT
Start: 2018-06-05 | End: 2019-06-11 | Stop reason: SDUPTHER

## 2018-06-11 ENCOUNTER — HOSPITAL ENCOUNTER (OUTPATIENT)
Dept: CARDIOLOGY | Facility: HOSPITAL | Age: 83
Setting detail: RECURRING SERIES
Discharge: HOME | End: 2018-06-11

## 2018-06-11 PROCEDURE — 85610 PROTHROMBIN TIME: CPT

## 2018-06-11 PROCEDURE — 36416 COLLJ CAPILLARY BLOOD SPEC: CPT

## 2018-06-14 ENCOUNTER — EPISODE CHANGES (OUTPATIENT)
Dept: CASE MANAGEMENT | Facility: OTHER | Age: 83
End: 2018-06-14

## 2018-06-20 ENCOUNTER — HOSPITAL ENCOUNTER (OUTPATIENT)
Dept: GENERAL RADIOLOGY | Facility: HOSPITAL | Age: 83
Discharge: HOME | End: 2018-06-20
Attending: THORACIC SURGERY (CARDIOTHORACIC VASCULAR SURGERY) | Admitting: THORACIC SURGERY (CARDIOTHORACIC VASCULAR SURGERY)

## 2018-06-20 ENCOUNTER — LAB (OUTPATIENT)
Dept: LAB | Facility: HOSPITAL | Age: 83
End: 2018-06-20

## 2018-06-20 ENCOUNTER — OFFICE VISIT (OUTPATIENT)
Dept: SURGERY | Facility: CLINIC | Age: 83
End: 2018-06-20

## 2018-06-20 ENCOUNTER — TELEPHONE (OUTPATIENT)
Dept: CARDIOLOGY | Facility: CLINIC | Age: 83
End: 2018-06-20

## 2018-06-20 VITALS
HEART RATE: 74 BPM | DIASTOLIC BLOOD PRESSURE: 73 MMHG | BODY MASS INDEX: 22.91 KG/M2 | SYSTOLIC BLOOD PRESSURE: 154 MMHG | WEIGHT: 146 LBS | OXYGEN SATURATION: 97 % | HEIGHT: 67 IN

## 2018-06-20 DIAGNOSIS — Z09 FOLLOW-UP EXAMINATION FOLLOWING SURGERY: ICD-10-CM

## 2018-06-20 DIAGNOSIS — J90 PLEURAL EFFUSION: Primary | ICD-10-CM

## 2018-06-20 DIAGNOSIS — J90 PLEURAL EFFUSION: ICD-10-CM

## 2018-06-20 DIAGNOSIS — Z51.81 THERAPEUTIC DRUG MONITORING: ICD-10-CM

## 2018-06-20 LAB
ANION GAP SERPL CALCULATED.3IONS-SCNC: 12.8 MMOL/L
BUN BLD-MCNC: 24 MG/DL (ref 8–23)
BUN/CREAT SERPL: 19.7 (ref 7–25)
CALCIUM SPEC-SCNC: 9.2 MG/DL (ref 8.6–10.5)
CHLORIDE SERPL-SCNC: 98 MMOL/L (ref 98–107)
CO2 SERPL-SCNC: 28.2 MMOL/L (ref 22–29)
CREAT BLD-MCNC: 1.22 MG/DL (ref 0.76–1.27)
GFR SERPL CREATININE-BSD FRML MDRD: 56 ML/MIN/1.73
GLUCOSE BLD-MCNC: 97 MG/DL (ref 65–99)
POTASSIUM BLD-SCNC: 4.1 MMOL/L (ref 3.5–5.2)
SODIUM BLD-SCNC: 139 MMOL/L (ref 136–145)

## 2018-06-20 PROCEDURE — 99024 POSTOP FOLLOW-UP VISIT: CPT | Performed by: THORACIC SURGERY (CARDIOTHORACIC VASCULAR SURGERY)

## 2018-06-20 PROCEDURE — 36415 COLL VENOUS BLD VENIPUNCTURE: CPT

## 2018-06-20 PROCEDURE — 71046 X-RAY EXAM CHEST 2 VIEWS: CPT

## 2018-06-20 PROCEDURE — 80048 BASIC METABOLIC PNL TOTAL CA: CPT

## 2018-06-20 NOTE — TELEPHONE ENCOUNTER
Left voicemail that BMP was stable. Increased CR with increased diuretic expected. Will monitor. AL

## 2018-06-20 NOTE — PROGRESS NOTES
Subjective   Patient ID: Harry Ventura is a 87 y.o. male is here today for follow-up.    History of Present Illness  Dear Colleagues,   Harry Ventura is here today for follow-up of a right VAT with decortication of the right lung performed April 20, 2018 for recurrent pleural effusion with trapped lung.  Patient's postoperative course was stormy.  He was transferred to rehabilitation and has made an excellent recovery.  He is at home now and has pretty well resumed all of his normal activities.  He is experiencing no shortness of breath.  He has no chest wall pain and no pleuritic pain.  He does have a dry cough.  His appetite is good and his weight is back to baseline    The following portions of the patient's history were reviewed and updated as appropriate: allergies, current medications, past family history, past medical history, past social history, past surgical history and problem list.  Review of Systems   Constitution: Negative.   HENT: Negative.    Eyes: Negative.    Cardiovascular: Negative.    Respiratory: Positive for cough.         Productive   Endocrine: Negative.    Hematologic/Lymphatic: Negative.    Skin: Negative.    Musculoskeletal: Negative.    Gastrointestinal: Negative.    Genitourinary: Negative.    Neurological: Negative.    Psychiatric/Behavioral: Negative.         Objective   Physical Exam   Constitutional: He is oriented to person, place, and time. He appears well-developed and well-nourished.   HENT:   Head: Normocephalic.   Eyes: Conjunctivae, EOM and lids are normal. Pupils are equal, round, and reactive to light.   Neck: Trachea normal and normal range of motion. Neck supple. No hepatojugular reflux and no JVD present. Carotid bruit is not present. No thyroid mass and no thyromegaly present.   Cardiovascular: Normal rate, regular rhythm, S1 normal, S2 normal, normal heart sounds and normal pulses.   No extrasystoles are present. PMI is not displaced.    Pulmonary/Chest: Effort  normal and breath sounds normal.   Right chest incisions are clean dry and well healed.  Chest wall is stable.  There are no subcutaneous masses or nodules.  There is good air movement at the right base.   Abdominal: Soft. Normal appearance and bowel sounds are normal. He exhibits no mass. There is no hepatosplenomegaly. There is no tenderness. No hernia.   Musculoskeletal: Normal range of motion.   Neurological: He is alert and oriented to person, place, and time. He has normal strength and normal reflexes. No cranial nerve deficit or sensory deficit. He displays a negative Romberg sign.   Skin: Skin is warm, dry and intact.   Psychiatric: He has a normal mood and affect. His speech is normal and behavior is normal. Judgment and thought content normal. Cognition and memory are normal.       Assessment/Plan   Independent Review of Radiographic Studies:    Chest x-ray performed today was independently reviewed and compared to a previous x-rays.  There is marked reduction in the right pleural fluid.  Right lung is well expanded.  There is scarring in the pleural space and in the right lung as expected.  Left lung is clear.    Assessment/Plan: The patient has made a good recovery and has had reduction in his shortness of breath with the surgery.  Further follow-up in our office is not required.  I will be glad to see the patient anytime in the future you feel it's necessary.  Thank you for allowing us to participate in the care of Mr. Ventura.      Diagnoses and all orders for this visit:    Pleural effusion    Follow-up examination following surgery

## 2018-06-22 ENCOUNTER — EPISODE CHANGES (OUTPATIENT)
Dept: CASE MANAGEMENT | Facility: OTHER | Age: 83
End: 2018-06-22

## 2018-06-25 ENCOUNTER — HOSPITAL ENCOUNTER (OUTPATIENT)
Dept: CARDIOLOGY | Facility: HOSPITAL | Age: 83
Setting detail: RECURRING SERIES
Discharge: HOME | End: 2018-06-25

## 2018-06-25 PROCEDURE — 85610 PROTHROMBIN TIME: CPT

## 2018-06-25 PROCEDURE — 36416 COLLJ CAPILLARY BLOOD SPEC: CPT

## 2018-07-02 ENCOUNTER — EPISODE CHANGES (OUTPATIENT)
Dept: CASE MANAGEMENT | Facility: OTHER | Age: 83
End: 2018-07-02

## 2018-07-06 RX ORDER — POTASSIUM CHLORIDE 750 MG/1
TABLET, EXTENDED RELEASE ORAL
Qty: 180 TABLET | Refills: 0 | Status: SHIPPED | OUTPATIENT
Start: 2018-07-06 | End: 2019-11-26 | Stop reason: SDUPTHER

## 2018-07-25 ENCOUNTER — HOSPITAL ENCOUNTER (OUTPATIENT)
Dept: CARDIOLOGY | Facility: HOSPITAL | Age: 83
Setting detail: RECURRING SERIES
Discharge: HOME | End: 2018-07-25

## 2018-07-25 PROCEDURE — 85610 PROTHROMBIN TIME: CPT

## 2018-07-25 PROCEDURE — 36416 COLLJ CAPILLARY BLOOD SPEC: CPT

## 2018-07-30 ENCOUNTER — HOSPITAL ENCOUNTER (OUTPATIENT)
Dept: CARDIOLOGY | Facility: HOSPITAL | Age: 83
Setting detail: RECURRING SERIES
Discharge: HOME | End: 2018-07-30

## 2018-07-30 PROCEDURE — 36416 COLLJ CAPILLARY BLOOD SPEC: CPT

## 2018-07-30 PROCEDURE — 85610 PROTHROMBIN TIME: CPT

## 2018-08-08 ENCOUNTER — TELEPHONE (OUTPATIENT)
Dept: PHARMACY | Facility: HOSPITAL | Age: 83
End: 2018-08-08

## 2018-08-13 ENCOUNTER — ANTICOAGULATION VISIT (OUTPATIENT)
Dept: PHARMACY | Facility: HOSPITAL | Age: 83
End: 2018-08-13

## 2018-08-13 DIAGNOSIS — I48.91 ATRIAL FIBRILLATION, UNSPECIFIED TYPE (HCC): ICD-10-CM

## 2018-08-13 LAB
INR PPP: 3 (ref 0.91–1.09)
PROTHROMBIN TIME: 36.6 SECONDS (ref 10–13.8)

## 2018-08-13 PROCEDURE — G0463 HOSPITAL OUTPT CLINIC VISIT: HCPCS

## 2018-08-13 PROCEDURE — 85610 PROTHROMBIN TIME: CPT

## 2018-08-13 PROCEDURE — 36416 COLLJ CAPILLARY BLOOD SPEC: CPT

## 2018-08-13 NOTE — PROGRESS NOTES
Anticoagulation Clinic Progress Note  Anticoagulation Summary  As of 2018    INR goal:   2.0-3.0   TTR:   83.2 % (9.9 mo)   Today's INR:   3.0   Warfarin maintenance plan:   5 mg on Fri; 3.75 mg all other days   Weekly warfarin total:   27.5 mg   Plan last modified:   Trini Coreas RPH (2018)   Next INR check:   9/10/2018   Target end date:   Indefinite    Indications    Atrial fibrillation (CMS/HCC) [I48.91]             Anticoagulation Episode Summary     INR check location:       Preferred lab:       Send INR reminders to:   [facility]    Comments:         Anticoagulation Care Providers     Provider Role Specialty Phone number    Nidhi Juarez MD Referring Cardiology [phone]    Trini Coreas RPH Responsible Pharmacy             Drug interactions: SAME MEDS  Diet: REMAIN THE SAME    Clinic Interview:  Patient Findings     Negatives:   Signs/symptoms of thrombosis, Signs/symptoms of bleeding,   Laboratory test error suspected, Change in health, Change in alcohol use,   Change in activity, Upcoming invasive procedure, Emergency department   visit, Upcoming dental procedure, Missed doses, Extra doses, Change in   medications, Change in diet/appetite, Hospital admission, Bruising, Other   complaints      Clinical Outcomes     Negatives:   Major bleeding event, Thromboembolic event,   Anticoagulation-related hospital admission, Anticoagulation-related ED   visit, Anticoagulation-related fatality        Education:  Harry Ventura is a new start in the [facility]. We discussed the followin) Warfarin's indication, mechanism, and dosing  2) Enforced the importance of taking warfarin as instructed and at the same time every day, preferably in the evening so that we can make dose adjustments more easily following subsequent clinic visits  3) What he should do about a missed dose; pts can take missed doses within about 12 hours of their usual scheduled dose, but he was  instructed on the importance of not doubling up on doses unless told to do so by the [facility]  4) Explained possible side effects of warfarin therapy, including increased risk of bleeding, s/sx of bleeding and s/sx of any additional clots/PE/CVA.   5) Discussed monitoring of warfarin, the INR, goal INR range, and the frequency of monitoring  6) Reviewed drug/food/tobacco/EtOH interactions and provided written information covering these topics in more detail, explaining that green, leafy vegetables interact most heavily with warfarin  7) Instructed the pt not to take or discontinue any medications without informing his physician/pharmacist and reminded him to inform us of any dietary changes, as well  8) Explained that he would be coming into the clinic more frequently in these first few weeks of therapy as we try to adjust his dose and achieve a therapeutic INR x 2 consecutive readings. Once that is achieved, patient will follow up in clinic every 4 weeks, on average.    He stated no problems with transportation or scheduling clinic appts in this manner. he expressed understanding of the information provided and has no additional questions at this time.    Harry Ventura was presented with a copy of the Patients Rights and Responsibilities. he expressed verbal consent and agreement to receive care in the [facility] under the current collaborative care agreement with [facility].       INR History:  Previous INR data from [facility] is recorded in [facility] and scanned into the patient's chart in [employer]. These results have been analyzed and reviewed.    Plan:  1. INR is Therapeutic today- see above in Anticoagulation Summary.   Will instruct Harry Ventura to Continue their warfarin regimen- see above in Anticoagulation Summary.  2. Follow up in 1 month  3. Patient declines warfarin refills.  4. Verbal and written information provided. Patient expresses  understanding and has no further questions at this time.    Trini Coreas, RPH

## 2018-08-28 ENCOUNTER — CLINICAL SUPPORT NO REQUIREMENTS (OUTPATIENT)
Dept: CARDIOLOGY | Facility: CLINIC | Age: 83
End: 2018-08-28

## 2018-08-28 DIAGNOSIS — I48.20 CHRONIC ATRIAL FIBRILLATION (HCC): Primary | ICD-10-CM

## 2018-08-28 PROCEDURE — 93288 INTERROG EVL PM/LDLS PM IP: CPT | Performed by: INTERNAL MEDICINE

## 2018-09-14 ENCOUNTER — ANTICOAGULATION VISIT (OUTPATIENT)
Dept: PHARMACY | Facility: HOSPITAL | Age: 83
End: 2018-09-14

## 2018-09-14 DIAGNOSIS — I48.91 ATRIAL FIBRILLATION, UNSPECIFIED TYPE (HCC): ICD-10-CM

## 2018-09-14 LAB
INR PPP: 1.7 (ref 0.91–1.09)
PROTHROMBIN TIME: 20.4 SECONDS (ref 10–13.8)

## 2018-09-14 PROCEDURE — 85610 PROTHROMBIN TIME: CPT

## 2018-09-14 PROCEDURE — 36416 COLLJ CAPILLARY BLOOD SPEC: CPT

## 2018-09-14 PROCEDURE — G0463 HOSPITAL OUTPT CLINIC VISIT: HCPCS

## 2018-09-14 NOTE — PROGRESS NOTES
Anticoagulation Clinic Progress Note    Anticoagulation Summary  As of 9/14/2018    INR goal:   2.0-3.0   TTR:   82.6 % (10.9 mo)   Today's INR:   1.7!   Warfarin maintenance plan:   5 mg on Mon, Fri; 3.75 mg all other days   Weekly warfarin total:   28.75 mg   Plan last modified:   Jennifer Dodd [facility] (9/14/2018)   Next INR check:   10/1/2018   Target end date:   Indefinite    Indications    Atrial fibrillation (CMS/HCC) [I48.91]             Anticoagulation Episode Summary     INR check location:       Preferred lab:       Send INR reminders to:    [facility]    Comments:         Anticoagulation Care Providers     Provider Role Specialty Phone number    Nidhi Juarez MD Referring Cardiology [phone]    Trini Coreas [facility]           Drug interactions: has remained unchanged.  Diet: has remained unchanged.    Clinic Interview:  Patient Findings     Negatives:   Signs/symptoms of thrombosis, Signs/symptoms of bleeding,   Laboratory test error suspected, Change in health, Change in alcohol use,   Change in activity, Upcoming invasive procedure, Emergency department   visit, Upcoming dental procedure, Missed doses, Extra doses, Change in   medications, Change in diet/appetite, Hospital admission, Bruising, Other   complaints      Clinical Outcomes     Negatives:   Major bleeding event, Thromboembolic event,   Anticoagulation-related hospital admission, Anticoagulation-related ED   visit, Anticoagulation-related fatality        INR History:  Anticoagulation Monitoring 8/16/2017 8/13/2018 9/14/2018   INR - 3.0 1.7   INR Date - 8/13/2018 9/14/2018   INR Goal 2.0-3.0 2.0-3.0 2.0-3.0   Trend - - Up   Last Week Total 0 mg 0 mg 27.5 mg   Next Week Total 0 mg 27.5 mg 28.75 mg   Sun - 3.75 mg 3.75 mg   Mon - 3.75 mg 5 mg   Tue - 3.75 mg 3.75 mg   Wed - 3.75 mg 3.75 mg   Thu - 3.75 mg 3.75 mg   Fri - 5 mg 5 mg   Sat - 3.75 mg 3.75 mg   Visit Report - - -   Some recent data might be hidden      Previous INR data from [facility] is recorded in [facility] [place] and scanned into the patient's chart in [facility]. These results have been analyzed and reviewed.      Plan:  1. INR is Subtherapeutic today- see above in Anticoagulation Summary.  Will instruct Harry Ventura to Change their warfarin regimen- Increase weekly dose from 27.5mg to 28.75mg.   2. Follow up in 2 weeks  3. Patient declines warfarin refills.  4. Verbal and written information provided. Patient expresses understanding and has no further questions at this time.    Jennifer Dodd [facility]

## 2018-09-26 ENCOUNTER — OFFICE VISIT (OUTPATIENT)
Dept: ONCOLOGY | Facility: CLINIC | Age: 83
End: 2018-09-26

## 2018-09-26 ENCOUNTER — LAB (OUTPATIENT)
Dept: LAB | Facility: HOSPITAL | Age: 83
End: 2018-09-26

## 2018-09-26 VITALS
OXYGEN SATURATION: 96 % | SYSTOLIC BLOOD PRESSURE: 142 MMHG | WEIGHT: 147 LBS | RESPIRATION RATE: 14 BRPM | TEMPERATURE: 98 F | DIASTOLIC BLOOD PRESSURE: 70 MMHG | BODY MASS INDEX: 23.07 KG/M2 | HEIGHT: 67 IN | HEART RATE: 73 BPM

## 2018-09-26 DIAGNOSIS — Z85.6 PERSONAL HISTORY OF CLL (CHRONIC LYMPHOCYTIC LEUKEMIA): Primary | ICD-10-CM

## 2018-09-26 DIAGNOSIS — N18.30 ANEMIA OF CHRONIC RENAL FAILURE, STAGE 3 (MODERATE) (HCC): Primary | ICD-10-CM

## 2018-09-26 DIAGNOSIS — D63.1 ANEMIA OF CHRONIC RENAL FAILURE, STAGE 3 (MODERATE) (HCC): ICD-10-CM

## 2018-09-26 DIAGNOSIS — N18.30 ANEMIA OF CHRONIC RENAL FAILURE, STAGE 3 (MODERATE) (HCC): ICD-10-CM

## 2018-09-26 DIAGNOSIS — D63.1 ANEMIA OF CHRONIC RENAL FAILURE, STAGE 3 (MODERATE) (HCC): Primary | ICD-10-CM

## 2018-09-26 LAB
BASOPHILS # BLD AUTO: 0.05 10*3/MM3 (ref 0–0.1)
BASOPHILS NFR BLD AUTO: 0.7 % (ref 0–1.1)
DEPRECATED RDW RBC AUTO: 52.8 FL (ref 37–49)
EOSINOPHIL # BLD AUTO: 0.36 10*3/MM3 (ref 0–0.36)
EOSINOPHIL NFR BLD AUTO: 4.8 % (ref 1–5)
ERYTHROCYTE [DISTWIDTH] IN BLOOD BY AUTOMATED COUNT: 15.4 % (ref 11.7–14.5)
FERRITIN SERPL-MCNC: 190.8 NG/ML (ref 30–400)
FOLATE SERPL-MCNC: 9.34 NG/ML (ref 4.78–24.2)
HCT VFR BLD AUTO: 35.8 % (ref 40–49)
HGB BLD-MCNC: 11.5 G/DL (ref 13.5–16.5)
IMM GRANULOCYTES # BLD: 0.02 10*3/MM3 (ref 0–0.03)
IMM GRANULOCYTES NFR BLD: 0.3 % (ref 0–0.5)
IRON 24H UR-MRATE: 50 MCG/DL (ref 59–158)
IRON SATN MFR SERPL: 17 % (ref 14–48)
LYMPHOCYTES # BLD AUTO: 1.35 10*3/MM3 (ref 1–3.5)
LYMPHOCYTES NFR BLD AUTO: 18.1 % (ref 20–49)
MCH RBC QN AUTO: 30.1 PG (ref 27–33)
MCHC RBC AUTO-ENTMCNC: 32.1 G/DL (ref 32–35)
MCV RBC AUTO: 93.7 FL (ref 83–97)
MONOCYTES # BLD AUTO: 0.64 10*3/MM3 (ref 0.25–0.8)
MONOCYTES NFR BLD AUTO: 8.6 % (ref 4–12)
NEUTROPHILS # BLD AUTO: 5.05 10*3/MM3 (ref 1.5–7)
NEUTROPHILS NFR BLD AUTO: 67.5 % (ref 39–75)
NRBC BLD MANUAL-RTO: 0 /100 WBC (ref 0–0)
PLATELET # BLD AUTO: 167 10*3/MM3 (ref 150–375)
PMV BLD AUTO: 9.7 FL (ref 8.9–12.1)
RBC # BLD AUTO: 3.82 10*6/MM3 (ref 4.3–5.5)
TIBC SERPL-MCNC: 300 MCG/DL (ref 249–505)
TRANSFERRIN SERPL-MCNC: 214 MG/DL (ref 200–360)
VIT B12 BLD-MCNC: 397 PG/ML (ref 211–946)
WBC NRBC COR # BLD: 7.47 10*3/MM3 (ref 4–10)

## 2018-09-26 PROCEDURE — 36415 COLL VENOUS BLD VENIPUNCTURE: CPT | Performed by: INTERNAL MEDICINE

## 2018-09-26 PROCEDURE — 84466 ASSAY OF TRANSFERRIN: CPT | Performed by: INTERNAL MEDICINE

## 2018-09-26 PROCEDURE — 83540 ASSAY OF IRON: CPT | Performed by: INTERNAL MEDICINE

## 2018-09-26 PROCEDURE — 99213 OFFICE O/P EST LOW 20 MIN: CPT | Performed by: INTERNAL MEDICINE

## 2018-09-26 PROCEDURE — 82607 VITAMIN B-12: CPT | Performed by: INTERNAL MEDICINE

## 2018-09-26 PROCEDURE — 82746 ASSAY OF FOLIC ACID SERUM: CPT | Performed by: INTERNAL MEDICINE

## 2018-09-26 PROCEDURE — 85025 COMPLETE CBC W/AUTO DIFF WBC: CPT | Performed by: INTERNAL MEDICINE

## 2018-09-26 PROCEDURE — 82728 ASSAY OF FERRITIN: CPT | Performed by: INTERNAL MEDICINE

## 2018-10-11 ENCOUNTER — EPISODE CHANGES (OUTPATIENT)
Dept: CASE MANAGEMENT | Facility: OTHER | Age: 83
End: 2018-10-11

## 2018-10-15 ENCOUNTER — ANTICOAGULATION VISIT (OUTPATIENT)
Dept: PHARMACY | Facility: HOSPITAL | Age: 83
End: 2018-10-15

## 2018-10-15 DIAGNOSIS — I48.91 ATRIAL FIBRILLATION, UNSPECIFIED TYPE (HCC): ICD-10-CM

## 2018-10-15 LAB
INR PPP: 1.5 (ref 0.91–1.09)
PROTHROMBIN TIME: 17.9 SECONDS (ref 10–13.8)

## 2018-10-15 PROCEDURE — 36416 COLLJ CAPILLARY BLOOD SPEC: CPT

## 2018-10-15 PROCEDURE — G0463 HOSPITAL OUTPT CLINIC VISIT: HCPCS | Performed by: PHARMACIST

## 2018-10-15 PROCEDURE — 85610 PROTHROMBIN TIME: CPT

## 2018-10-15 NOTE — PROGRESS NOTES
Anticoagulation Clinic Progress Note    Anticoagulation Summary  As of 10/15/2018    INR goal:   2.0-3.0   TTR:   75.5 % (12 mo)   Today's INR:   1.5!   Warfarin maintenance plan:   5 mg on Mon, Fri; 3.75 mg all other days   Weekly warfarin total:   28.75 mg   Plan last modified:   Jennifer Dodd [facility] (9/14/2018)   Next INR check:   10/30/2018   Priority:   High   Target end date:   Indefinite    Indications    Atrial fibrillation (CMS/HCC) [I48.91]             Anticoagulation Episode Summary     INR check location:       Preferred lab:       Send INR reminders to:   [facility]    Comments:         Anticoagulation Care Providers     Provider Role Specialty Phone number    Nidhi Juarez MD Referring Cardiology [phone]    Trini Coreas [facility] Respons[facility]           Drug interactions: has remained unchanged.  Diet: has remained unchanged.    Clinic Interview:  Patient Findings     Positives:   Missed doses    Negatives:   Signs/symptoms of thrombosis, Signs/symptoms of bleeding,   Laboratory test error suspected, Change in health, Change in alcohol use,   Change in activity, Upcoming invasive procedure, Emergency department   visit, Upcoming dental procedure, Extra doses, Change in medications,   Change in diet/appetite, Hospital admission, Bruising, Other complaints    Comments:   Constantly misses PM doses due to forgetting; instructed to   switch to AM      Clinical Outcomes     Negatives:   Major bleeding event, Thromboembolic event,   Anticoagulation-related hospital admission, Anticoagulation-related ED   visit, Anticoagulation-related fatality    Comments:   Constantly misses PM doses due to forgetting; instructed to   switch to AM        INR History:  Anticoagulation Monitoring 8/13/2018 9/14/2018 10/15/2018   INR 3.0 1.7 1.5   INR Date 8/13/2018 9/14/2018 10/15/2018   INR Goal 2.0-3.0 2.0-3.0 2.0-3.0   Trend - Up Same   Last Week Total 0 mg 27.5 mg 28.75 mg   Next Week Total 27.5  mg 28.75 mg 30 mg   Sun 3.75 mg 3.75 mg 3.75 mg   Mon 3.75 mg 5 mg 5 mg   Tue 3.75 mg 3.75 mg 5 mg (10/16); Otherwise 3.75 mg   Wed 3.75 mg 3.75 mg 3.75 mg   Thu 3.75 mg 3.75 mg 3.75 mg   Fri 5 mg 5 mg 5 mg   Sat 3.75 mg 3.75 mg 3.75 mg   Visit Report - - -   Some recent data might be hidden       Plan:  1. INR is Subtherapeutic today- see above in Anticoagulation Summary.  Will instruct Harry Ventura to Increase their warfarin regimen- see above in Anticoagulation Summary.  2. Follow up in 2 weeks  3. Patient declines warfarin refills.  4. Verbal and written information provided. Patient expresses understanding and has no further questions at this time.    Tiki Mallory [facility]

## 2018-10-30 ENCOUNTER — OFFICE VISIT (OUTPATIENT)
Dept: CARDIOLOGY | Facility: CLINIC | Age: 83
End: 2018-10-30

## 2018-10-30 ENCOUNTER — ANTICOAGULATION VISIT (OUTPATIENT)
Dept: PHARMACY | Facility: HOSPITAL | Age: 83
End: 2018-10-30

## 2018-10-30 VITALS
WEIGHT: 147 LBS | BODY MASS INDEX: 23.07 KG/M2 | SYSTOLIC BLOOD PRESSURE: 148 MMHG | HEIGHT: 67 IN | DIASTOLIC BLOOD PRESSURE: 66 MMHG | HEART RATE: 65 BPM

## 2018-10-30 DIAGNOSIS — I10 ESSENTIAL HYPERTENSION: ICD-10-CM

## 2018-10-30 DIAGNOSIS — I25.10 CORONARY ARTERY DISEASE INVOLVING NATIVE CORONARY ARTERY OF NATIVE HEART WITHOUT ANGINA PECTORIS: ICD-10-CM

## 2018-10-30 DIAGNOSIS — J44.9 CHRONIC OBSTRUCTIVE PULMONARY DISEASE, UNSPECIFIED COPD TYPE (HCC): ICD-10-CM

## 2018-10-30 DIAGNOSIS — I50.9 CHRONIC CONGESTIVE HEART FAILURE, UNSPECIFIED HEART FAILURE TYPE (HCC): ICD-10-CM

## 2018-10-30 DIAGNOSIS — Z95.0 PACEMAKER: ICD-10-CM

## 2018-10-30 DIAGNOSIS — I27.20 PULMONARY HYPERTENSION (HCC): ICD-10-CM

## 2018-10-30 DIAGNOSIS — I48.91 ATRIAL FIBRILLATION, UNSPECIFIED TYPE (HCC): Primary | ICD-10-CM

## 2018-10-30 LAB
INR PPP: 2.8 (ref 0.91–1.09)
PROTHROMBIN TIME: 33.5 SECONDS (ref 10–13.8)

## 2018-10-30 PROCEDURE — 93000 ELECTROCARDIOGRAM COMPLETE: CPT | Performed by: NURSE PRACTITIONER

## 2018-10-30 PROCEDURE — 99214 OFFICE O/P EST MOD 30 MIN: CPT | Performed by: NURSE PRACTITIONER

## 2018-10-30 PROCEDURE — 85610 PROTHROMBIN TIME: CPT

## 2018-10-30 PROCEDURE — 36416 COLLJ CAPILLARY BLOOD SPEC: CPT

## 2018-10-30 NOTE — PROGRESS NOTES
Anticoagulation Clinic Progress Note    Anticoagulation Summary  As of 10/30/2018    INR goal:   2.0-3.0   TTR:   74.9 % (1 y)   Today's INR:   2.8   Warfarin maintenance plan:   5 mg on Mon, Fri; 3.75 mg all other days   Weekly warfarin total:   28.75 mg   No change documented:   Keri Cardoso   Plan last modified:   Jennifer Dodd [facility] (9/14/2018)   Next INR check:   11/13/2018   Priority:   High   Target end date:   Indefinite    Indications    Atrial fibrillation (CMS/HCC) [I48.91]             Anticoagulation Episode Summary     INR check location:       Preferred lab:       Send INR reminders to:    [facility]    Comments:         Anticoagulation Care Providers     Provider Role Specialty Phone number    Nidhi Juarez MD Referring Cardiology [phone]    Trini Coreas [facility] Respons[facility]           Drug interactions: has remained unchanged.  Diet: has remained unchanged.    Clinic Interview:  Patient Findings     Negatives:   Signs/symptoms of thrombosis, Signs/symptoms of bleeding,   Laboratory test error suspected, Change in health, Change in alcohol use,   Change in activity, Upcoming invasive procedure, Emergency department   visit, Upcoming dental procedure, Missed doses, Extra doses, Change in   medications, Change in diet/appetite, Hospital admission, Bruising, Other   complaints      Clinical Outcomes     Negatives:   Major bleeding event, Thromboembolic event,   Anticoagulation-related hospital admission, Anticoagulation-related ED   visit, Anticoagulation-related fatality        INR History:  Anticoagulation Monitoring 9/14/2018 10/15/2018 10/30/2018   INR 1.7 1.5 2.8   INR Date 9/14/2018 10/15/2018 10/30/2018   INR Goal 2.0-3.0 2.0-3.0 2.0-3.0   Trend Up Same Same   Last Week Total 27.5 mg 28.75 mg 28.75 mg   Next Week Total 28.75 mg 30 mg 28.75 mg   Sun 3.75 mg 3.75 mg 3.75 mg   Mon 5 mg 5 mg 5 mg   Tue 3.75 mg 5 mg (10/16); Otherwise 3.75 mg 3.75 mg   Wed 3.75 mg 3.75  mg 3.75 mg   Thu 3.75 mg 3.75 mg 3.75 mg   Fri 5 mg 5 mg 5 mg   Sat 3.75 mg 3.75 mg 3.75 mg   Visit Report - - -   Some recent data might be hidden       Plan:  1. INR is therapeutic today- see above in Anticoagulation Summary.   Will instruct Harry Ventura to continue their warfarin regimen- see above in Anticoagulation Summary.  2. Follow up in 2 weeks.  3. Patient declines warfarin refills.  4. Verbal and written information provided. Patient expresses understanding and has no further questions at this time.    Keri Cardoso

## 2018-10-30 NOTE — PROGRESS NOTES
Date of Office Visit: 10/30/2018  Encounter Provider: Keysha Sainz, MELVA, APRN  Place of Service: [facility] CARDIOLOGY  Patient Name: Harry Ventura  :1930      Subjective:     Chief Complaint:  Follow-up coronary artery disease, atrial fibrillation, diastolic heart failure, hypertension, pacemaker.    History of Present Illness:  Harry Ventura is a 87 y.o. male patient of Dr. Juarez.  This is my first time seeing him in the office and I have reviewed his records.    Patient has a history of coronary artery disease, atrial fibrillation, chronic diastolic congestive heart failure, pulmonary hypertension, hypertension, hyperlipidemia, sick sinus syndrome status post PPM, recurrent right pleural effusion status post thoracentesis, alcohol dependence, chronic lymphocytic leukemia.    Per previous office notes, in  patient was admitted with heart failure that was thought to be related to medical noncompliance and dietary noncompliance.  He was noted to have pulmonary hypertension 2015 in the setting of diastolic dysfunction with an RVSP of 67 mmHg.     Patient had a transthoracic echocardiogram done in 2016 showing normal left ventricular systolic function, mild aortic regurgitation, mild-to-moderate tricuspid regurgitation, mild mitral regurgitation and RVSP of 68 mmHg.   patient had a perfusion stress test in May 2016 that showed no evidence of ischemia with a normal left ventricular ejection fraction of 62%.  He was taken to the catheterization lab on 2016 for pulmonary hypertension to have a right and left heart cath.  The right heart catheterization revealed significant diastolic dysfunction and elevated biventricular pressures. He had normal left ventricular systolic function, mild coronary disease, mild pulmonary hypertension, and was to be managed medically with diuretics.     Patient was admitted in 2017 with facial droop.  He was diagnosed with  TIA.  His INR was subtherapeutic at 1.2 and he was given Lovenox until his INR became therapeutic.  Therapy was added to his regimen.   Patient remained on Coumadin due to financial burden of Eliquis (which was recommended).  At another echocardiogram that showed normal systolic function, mild mitral regurgitation, trivial to mild aortic valve regurgitation and no evidence of intra--atrial shunting with saline injection.  His creatinine was slightly elevated at 1.3 with a GFR of 52.       Patient was seen in the office 10/24/2017.  At that time he complained of chronic dyspnea on exertion.  He had some hematemesis due to pulmonary hypertension.  He had recently seen Dr. Yu had a walking test in his office.  He was not instructed to go on oxygen therapy.  Device was interrogated on that day revealed atrial fibrillation with ventricular pacing 62% of the time due to high ventricular rates with episodes lasting 14 and 17 seconds in duration. He had a pro BNP that was elevated at 3545.  He was recommended to increase his Lasix to 40 mg twice a day along with potassium twice a day.  He was hesitant to increase Lasix as he already has accident on 40 mg daily.     Patient was admitted to the hospital on April 20 for a right VAt with Dr. Serrano.  He tolerated tolerated surgery well without complication.  He was extubated in recovery and transferred to intensive care for close observation.  On his second postoperative night became very confused and disoriented.  There was some suspicion for alcohol withdrawal and he was placed on withdrawal protocol.  He later was transferred to the telemetry abrams with physical therapy as he was quite weak.  Because of the confusion and weakness was difficult for him to eat without aspiration.  With the confusion he pulled out his feeding tube and was evaluated by speech therapy.  All chest tubes were removed by the fifth postoperative day.  His chest x-rays remained stable.  He was  evaluated by cardiology due to chronic atrial fibrillation and chronic anticoagulation.  He developed some hypertension and was started on losartan.  His BNP was elevated.  He is maintained on oral diuretic.  He was discharged on 5/3/2018 to be followed by [facility] for speech and occupational therapy.    Patient was last seen in office 5/31/18 by NARINDER Zaidi for hospital follow-up appointment. He denied palpitations, chest pain, edema, lightheadedness, syncope, near-syncope, or fatigue.  His shortness of breath was reported to have some improvement since his procedure.  He reported that the shortness of breath very from day-to-day.  He would have some dizziness when he would get up too fast though it did not happen often and would improve when he would change physician more slowly.  He would occasionally exercise and walk around the grocery store for 20-30 minutes.  He reported that he needed to get more exercise.  He continued to drink 2-3 vodka tonics daily.  He previously quit smoking at age 40.  He had been  25 years with his wife is a retired nurse.  He had a follow-up appointment scheduled with Dr. Serrano in 2 weeks.  Patient was instructed to follow-up with Dr. Juarez in 6 weeks or sooner if needed.    Patient presents to office today for follow-up appointment (overdue).  Patient reports that he has been feeling well since last visit.  He denies any new or worsening symptoms.  He has a history of chronic intermittent lower extremity edema for the past 20 years, however he reports that it is not worsening.  He also has a history of chronic shortness of breath with exertion, but he also reports has not worsened and actually has possibly improve somewhat since he started walking about 15 minutes a day outside.  He also continues to walk around the store when the weather is not is nice out.  He denies any exertional symptoms.  He is followed by pulmonology for coronary hypertension and COPD.  He  quit smoking many years ago.  He denies any chest pain, palpitations, racing heartbeat sensation, dizziness, syncope, near syncope, falls, abnormal bleeding.  He reports that he gets his INR checked regularly in our INR clinic.    Pacemaker check in office in one month.    Follow-up with Dr. Juarez in 6 months or follow-up sooner if needed for any new or worsening symptoms or other problems/concerns.        Past Medical History:   Diagnosis Date   • Accelerated essential hypertension    • Anemia    • Atrial fibrillation (CMS/HCC)    • CAP (community acquired pneumonia)    • CHF (congestive heart failure) (CMS/HCC)    • Chronic anticoagulation    • Chronic diastolic CHF (congestive heart failure) (CMS/HCC)    • CLL (chronic lymphocytic leukemia) (CMS/HCC)     Stage 0   • Coronary artery disease    • ED (erectile dysfunction) of non-organic origin    • Gait instability    • GERD (gastroesophageal reflux disease)    • Glucose intolerance (malabsorption)    • Hematuria    • History of thrombocytopenia     Mild   • Hypercholesterolemia    • Hypertension     Pulmonary   • Hypogonadism male    • Insomnia    • Iron overload    • Low back pain    • Macrocytosis    • Pacemaker    • Personal history of CLL (chronic lymphocytic leukemia)    • Personal history of venous thrombosis and embolism    • Pleural effusion    • Renal insufficiency    • Seborrheic dermatitis    • SSS (sick sinus syndrome) (CMS/HCC)    • TIA (transient ischemic attack) 2017     Past Surgical History:   Procedure Laterality Date   • CARDIAC CATHETERIZATION N/A 6/17/2016    Procedure: Right and Left Heart Cath;  Surgeon: Harry Bolton MD;  Location: [facility] INVASIVE LOCATION;  Service:    • CARDIAC CATHETERIZATION N/A 6/17/2016    Procedure: Coronary angiography;  Surgeon: Harry Bolton MD;  Location: [facility] INVASIVE LOCATION;  Service:    • CARDIAC CATHETERIZATION N/A 6/17/2016    Procedure: Left Heart Cath;  Surgeon: Harry Bolton MD;  Location: [facility]  INVASIVE LOCATION;  Service:    • CARDIAC CATHETERIZATION N/A 6/17/2016    Procedure: Left ventriculography;  Surgeon: Harry Bolton MD;  Location: [facility] CATH INVASIVE LOCATION;  Service:    • CHOLECYSTECTOMY  1983   • PACEMAKER IMPLANTATION     • THORACOSCOPY Right 4/20/2018    Procedure: BRONCHOSCOPY, RIGHT VIDEO ASSISTED THORACOSCOPY, RIGHT LUNG DECORTICATION WITH PLEURAL BIOPSY, PLEURODESIS;  Surgeon: Erich Serrano III, MD;  Location: [facility] OR;  Service: Cardiothoracic   • UMBILICAL HERNIA REPAIR  2006    Performed laparoscopically by Dr. Garcia.     Outpatient Medications Prior to Visit   Medication Sig Dispense Refill   • aspirin 81 MG EC tablet Take 81 mg by mouth Daily.     • atorvastatin (LIPITOR) 40 MG tablet Take 40 mg by mouth Daily.     • fluticasone (FLONASE) 50 MCG/ACT nasal spray 2 sprays into the nostril(s) as directed by provider As Needed.     • furosemide (LASIX) 40 MG tablet Take 1 tablet by mouth Every Morning. 90 tablet 3   • metoprolol tartrate (LOPRESSOR) 50 MG tablet Take 50 mg by mouth Every Morning. Note: Not on file at pharmacy, but patient reports this is the dose he takes.     • O2 (OXYGEN) Inhale 2 L/min 1 (One) Time. As needed     • omeprazole (priLOSEC) 20 MG capsule Take 20 mg by mouth Daily.     • potassium chloride (K-DUR,KLOR-CON) 10 MEQ CR tablet TAKE 1 TABLET BY MOUTH TWO TIMES A DAY  (Patient taking differently: TAKE 1 TABLET BY MOUTH TWO TIMES A DAY  - currently taking one tablet daily) 180 tablet 0   • warfarin (COUMADIN) 2.5 MG tablet Take 2.5 mg by mouth See Admin Instructions. Daily or as directed by physician. Pharmacy has dose as 1.5 tabs daily.     • cetirizine (zyrTEC) 10 MG tablet Take 10 mg by mouth Daily.     • furosemide (LASIX) 20 MG tablet Take 1 tablet every afternoon 90 tablet 3   • metoprolol tartrate (LOPRESSOR) 50 MG tablet Take 50 mg by mouth Every Night. Note: Not on file at pharmacy, but patient reports this is the dose he takes.     • Potassium  (POTASSIMIN PO) Take 10 mEq by mouth Daily.     • valACYclovir (VALTREX) 500 MG tablet Take 500 mg by mouth 2 (Two) Times a Day As Needed (For fever blisters).       No facility-administered medications prior to visit.        Allergies as of 10/30/2018   • (No Known Allergies)     Social History     Social History   • Marital status:      Spouse name: Elena   • Number of children: N/A   • Years of education: N/A     Occupational History   •  Retired     Worked in sales for [employer].     Social History Main Topics   • Smoking status: Former Smoker     Packs/day: 0.00     Years: 20.00   • Smokeless tobacco: Never Used      Comment: Quit smoking over 30 years ago. //  caffeine use   • Alcohol use 1.8 oz/week     3 Shots of liquor per week      Comment: Three drinks a day wine or vodka, reporting 28 drinks a week.    • Drug use: No   • Sexual activity: Yes     Partners: Female     Other Topics Concern   • Not on file     Social History Narrative    Stays fairly active for his age, walking and golfing.      Family History   Problem Relation Age of Onset   • Emphysema Father    • Emphysema Sister    • Asthma Sister    • Coronary artery disease Other    • No Known Problems Mother    • Liver cancer Paternal Uncle 55   • Liver cancer Paternal Uncle 74   • Liver cancer Paternal Uncle 79   • Malig Hyperthermia Neg Hx        Review of Systems   Constitution: Positive for malaise/fatigue.   Eyes: Negative for redness.   Cardiovascular: Positive for dyspnea on exertion and leg swelling. Negative for chest pain, near-syncope, palpitations and syncope.   Respiratory: Negative for shortness of breath.    Hematologic/Lymphatic: Negative for bleeding problem.   Musculoskeletal: Negative for falls.   Gastrointestinal: Negative for jaundice.   Genitourinary: Positive for frequency.   Neurological: Negative for dizziness and seizures.   Psychiatric/Behavioral: Negative for altered mental status.          Objective:  "    Vitals:    10/30/18 1304   BP: 148/66   BP Location: Left arm   Pulse: 65   Weight: 66.7 kg (147 lb)   Height: 170.2 cm (67\")     Body mass index is 23.02 kg/m².      PHYSICAL EXAM:  Physical Exam   Constitutional: He is oriented to person, place, and time. He appears well-developed and well-nourished. No distress.   Petite.    HENT:   Head: Normocephalic and atraumatic.   Eyes: Pupils are equal, round, and reactive to light.   Neck: Neck supple. No JVD present. Carotid bruit is not present. No tracheal deviation present.   Cardiovascular: Normal rate, normal heart sounds and intact distal pulses.  An irregularly irregular rhythm present. Exam reveals no gallop and no friction rub.    No murmur heard.  Pulses:       Radial pulses are 2+ on the right side, and 2+ on the left side.        Posterior tibial pulses are 2+ on the right side, and 2+ on the left side.   Pulmonary/Chest: Effort normal and breath sounds normal. No respiratory distress. He has no wheezes. He has no rales.   Abdominal: Soft. Bowel sounds are normal. He exhibits no distension. There is no tenderness. There is no guarding.   Musculoskeletal: He exhibits edema (Trace to 1+ edema to bilateral ankles. ). He exhibits no tenderness or deformity.   Uses a cane for ambulation.    Neurological: He is alert and oriented to person, place, and time.   Skin: Skin is warm and dry. No rash noted. He is not diaphoretic. No erythema.   Psychiatric: He has a normal mood and affect.         ECG 12 Lead  Date/Time: 10/30/2018 1:45 PM  Performed by: COLIN SWENSON  Authorized by: COLIN SWENSON   Comparison: compared with previous ECG from 5/31/2018  Rhythm: atrial fibrillation  Rhythm comments: Pacemaker.  Ectopy: PVCs  Rate: normal  BPM: 65  Conduction: incomplete LBBB  Clinical impression: abnormal ECG            Assessment:       Diagnosis Plan   1. Atrial fibrillation, unspecified type (CMS/HCC)     2. Coronary artery disease involving native coronary " artery of native heart without angina pectoris     3. Chronic congestive heart failure, unspecified heart failure type (CMS/[facility])     4. Essential hypertension     5. Pacemaker     6. Pulmonary hypertension (CMS/[facility])     7. Chronic obstructive pulmonary disease, unspecified COPD type (CMS/[facility])             Plan:     1. Atrial fibrillation: EKG in office today shows patient remains in atrial fibrillation with controlled ventricular rate of 65 bpm.  Patient remains on warfarin for anticoagulation, as well as beta blocker for heart rate control.  Patient denies any palpitations or racing heartbeat sensation.    Atrial Fibrillation and Atrial Flutter  Assessment  • The patient has persistent atrial fibrillation  • This is non-valvular in etiology  • The patient's CHADS2-VASc score is 4  • A [id number] score of 2 or more is considered a high risk for a thromboembolic event  • Warfarin prescribed  • Dabigatran not prescribed  • Rivaroxaban not prescribed  • Apixaban not prescribed  • Aspirin not prescribed    Plan  • Continue in atrial fibrillation with rate control  • Continue warfarin for antithrombotic therapy, bleeding issues discussed  • Continue beta blocker for rhythm control      2. Coronary artery disease: Patient on aspirin 81 mg daily, atorvastatin 40 mg daily, Lasix 40 mg daily, metoprolol 50 mg daily.  Patient denies any chest pain or worsening shortness of breath with exertion.    Coronary Artery Disease  Assessment  • The patient has no angina    Plan  • Lifestyle modifications discussed include avoidance of tobacco products, medication compliance and regular exercise    Subjective - Objective  • Current antiplatelet therapy includes aspirin 81 mg        3. Chronic congestive heart failure: Patient on Lasix 40 mg daily.  Asymptomatic and clinically stable.  Mild edema present on exam, however patient denies any worsening of edema or of his shortness of breath- at baseline.   Heart  Failure  Subjective/Objective    • Physical exam findings negative for rales and elevated JVP.         4. Hypertension: Patient on Lasix, metoprolol.  Patient reports blood pressure outside the office stays 135-140 over 60s.  He reports that he gets symptomatic if his systolic blood pressure gets less than 130.  5. Sick sinus syndrome status post permanent pacemaker: Patient is scheduled for a pacemaker check in the office next month.  He reports he will keep this appointment.  He has not been having any symptoms or concerns since last visit.  6. Pulmonary hypertension: Patient has chronic shortness of breath with exertion, which he reports is unchanged.  He is also followed by pulmonology and reports he has seen them within the last couple of months.  He feels like his shortness of breath with exertion might actually have improved somewhat since last visit since she started walking outside about 15 minutes a day since the weather has cooled down.  7. COPD      Follow-up with Dr. Juarez in 6 months or follow-up sooner if needed for any new or worsening symptoms or other problems/concerns.             Your medication list          Accurate as of 10/30/18  1:42 PM. If you have any questions, ask your nurse or doctor.               CHANGE how you take these medications      Instructions Last Dose Given Next Dose Due   furosemide 40 MG tablet  Commonly known as:  LASIX  What changed:  Another medication with the same name was removed. Continue taking this medication, and follow the directions you see here.  Changed by:  Keysha Sainz DNP, APRN      Take 1 tablet by mouth Every Morning.       metoprolol tartrate 50 MG tablet  Commonly known as:  LOPRESSOR  What changed:  Another medication with the same name was removed. Continue taking this medication, and follow the directions you see here.  Changed by:  Keysha Sainz DNP, APRN      Take 50 mg by mouth Every Morning. Note: Not on file at pharmacy, but patient  reports this is the dose he takes.       potassium chloride 10 MEQ CR tablet  Commonly known as:  K-DUR,KLOR-CON  What changed:  See the new instructions.      TAKE 1 TABLET BY MOUTH TWO TIMES A DAY          CONTINUE taking these medications      Instructions Last Dose Given Next Dose Due   aspirin 81 MG EC tablet      Take 81 mg by mouth Daily.       atorvastatin 40 MG tablet  Commonly known as:  LIPITOR      Take 40 mg by mouth Daily.       fluticasone 50 MCG/ACT nasal spray  Commonly known as:  FLONASE      2 sprays into the nostril(s) as directed by provider As Needed.       O2  Commonly known as:  OXYGEN      Inhale 2 L/min 1 (One) Time. As needed       omeprazole 20 MG capsule  Commonly known as:  priLOSEC      Take 20 mg by mouth Daily.       warfarin 2.5 MG tablet  Commonly known as:  COUMADIN      Take 2.5 mg by mouth See Admin Instructions. Daily or as directed by physician. Pharmacy has dose as 1.5 tabs daily.          STOP taking these medications    cetirizine 10 MG tablet  Commonly known as:  zyrTEC  Stopped by:  Keysha Sainz DNP, APRN        POTASSIMIN PO  Stopped by:  Keysha Sainz DNP, APRN        valACYclovir 500 MG tablet  Commonly known as:  VALTREX  Stopped by:  Keysha Sainz DNP, APRN           I did not stop or change the above medications.  Patient's medication was updated to reflect medications he is currently taking including medication changes made by other providers.             Keysha Sainz DNP, APRN  10/30/2018       Dictated utilizing Dragon dictation

## 2018-11-13 ENCOUNTER — ANTICOAGULATION VISIT (OUTPATIENT)
Dept: PHARMACY | Facility: HOSPITAL | Age: 83
End: 2018-11-13

## 2018-11-13 DIAGNOSIS — I48.91 ATRIAL FIBRILLATION, UNSPECIFIED TYPE (HCC): ICD-10-CM

## 2018-11-13 LAB
INR PPP: 2.9 (ref 0.91–1.09)
PROTHROMBIN TIME: 34.8 SECONDS (ref 10–13.8)

## 2018-11-13 PROCEDURE — 36416 COLLJ CAPILLARY BLOOD SPEC: CPT

## 2018-11-13 PROCEDURE — 85610 PROTHROMBIN TIME: CPT

## 2018-11-13 NOTE — PROGRESS NOTES
Anticoagulation Clinic Progress Note    Anticoagulation Summary  As of 2018    INR goal:   2.0-3.0   TTR:   75.8 % (1.1 y)   INR used for dosin.9 (2018)   Warfarin maintenance plan:   5 mg on Mon, Fri; 3.75 mg all other days   Weekly warfarin total:   28.75 mg   No change documented:   Mady Javier   Plan last modified:   Jennifer Dodd [facility] (2018)   Next INR check:   2018   Priority:   Maintenance   Target end date:   Indefinite    Indications    Atrial fibrillation (CMS/HCC) [I48.91]             Anticoagulation Episode Summary     INR check location:       Preferred lab:       Send INR reminders to:    TOM KIM [facility]    Comments:         Anticoagulation Care Providers     Provider Role Specialty Phone number    Nidhi Juarez MD Referring Cardiology [phone]    Trini Coreas [facility] Responsible Pharmacy [phone]          Drug interactions: has remained unchanged.  Diet: has remained unchanged.    Clinic Interview:  Patient Findings     Negatives:   Signs/symptoms of thrombosis, Signs/symptoms of bleeding,   Laboratory test error suspected, Change in health, Change in alcohol use,   Change in activity, Upcoming invasive procedure, Emergency department   visit, Upcoming dental procedure, Missed doses, Extra doses, Change in   medications, Change in diet/appetite, Hospital admission, Bruising, Other   complaints      Clinical Outcomes     Negatives:   Major bleeding event, Thromboembolic event,   Anticoagulation-related hospital admission, Anticoagulation-related ED   visit, Anticoagulation-related fatality        INR History:  Anticoagulation Monitoring 10/15/2018 10/30/2018 2018   INR 1.5 2.8 2.9   INR Date 10/15/2018 10/30/2018 2018   INR Goal 2.0-3.0 2.0-3.0 2.0-3.0   Trend Same Same Same   Last Week Total 28.75 mg 28.75 mg 28.75 mg   Next Week Total 30 mg 28.75 mg 28.75 mg   Sun 3.75 mg 3.75 mg 3.75 mg   Mon 5 mg 5 mg 5 mg   Tue 5 mg (10/16);  Otherwise 3.75 mg 3.75 mg 3.75 mg   Wed 3.75 mg 3.75 mg 3.75 mg   Thu 3.75 mg 3.75 mg 3.75 mg   Fri 5 mg 5 mg 5 mg   Sat 3.75 mg 3.75 mg 3.75 mg   Visit Report - - -   Some recent data might be hidden       Plan:  1. INR is therapeutic today- see above in Anticoagulation Summary.   Will instruct Harry Ventura to continue their warfarin regimen- see above in Anticoagulation Summary.  2. Follow up in 4 weeks.  3. Patient declines warfarin refills.  4. Verbal and written information provided. Patient expresses understanding and has no further questions at this time.    Mady Javier

## 2018-11-26 RX ORDER — WARFARIN SODIUM 2.5 MG/1
TABLET ORAL
Qty: 60 TABLET | Refills: 5 | Status: SHIPPED | OUTPATIENT
Start: 2018-11-26 | End: 2019-06-11 | Stop reason: SDUPTHER

## 2018-12-11 ENCOUNTER — ANTICOAGULATION VISIT (OUTPATIENT)
Dept: PHARMACY | Facility: HOSPITAL | Age: 83
End: 2018-12-11

## 2018-12-11 LAB
INR PPP: 3.3 (ref 0.91–1.09)
PROTHROMBIN TIME: 39.7 SECONDS (ref 10–13.8)

## 2018-12-11 PROCEDURE — 36416 COLLJ CAPILLARY BLOOD SPEC: CPT

## 2018-12-11 PROCEDURE — G0463 HOSPITAL OUTPT CLINIC VISIT: HCPCS

## 2018-12-11 PROCEDURE — 85610 PROTHROMBIN TIME: CPT

## 2018-12-11 NOTE — PROGRESS NOTES
Anticoagulation Clinic Progress Note    Anticoagulation Summary  As of 12/11/2018    INR goal:   2.0-3.0   TTR:   72.4 % (1.1 y)   INR used for dosing:   3.3! (12/11/2018)   Warfarin maintenance plan:   5 mg on Mon; 3.75 mg all other days   Weekly warfarin total:   27.5 mg   Plan last modified:   Ady Anderson RPH (12/11/2018)   Next INR check:   12/28/2018   Priority:   High   Target end date:   Indefinite    Indications    Atrial fibrillation (CMS/HCC) [I48.91]             Anticoagulation Episode Summary     INR check location:       Preferred lab:       Send INR reminders to:   [facility]    Comments:         Anticoagulation Care Providers     Provider Role Specialty Phone number    Nidhi Juarez MD Referring Cardiology [phone]          Drug interactions: has remained unchanged.  Diet: has remained unchanged.    Clinic Interview:  Patient Findings     Negatives:   Signs/symptoms of thrombosis, Signs/symptoms of bleeding,   Laboratory test error suspected, Change in health, Change in alcohol use,   Change in activity, Upcoming invasive procedure, Emergency department   visit, Upcoming dental procedure, Missed doses, Extra doses, Change in   medications, Change in diet/appetite, Hospital admission, Bruising, Other   complaints      Clinical Outcomes     Negatives:   Major bleeding event, Thromboembolic event,   Anticoagulation-related hospital admission, Anticoagulation-related ED   visit, Anticoagulation-related fatality        INR History:  Anticoagulation Monitoring 10/30/2018 11/13/2018 12/11/2018   INR 2.8 2.9 3.3   INR Date 10/30/2018 11/13/2018 12/11/2018   INR Goal 2.0-3.0 2.0-3.0 2.0-3.0   Trend Same Same Down   Last Week Total 28.75 mg 28.75 mg 28.75 mg   Next Week Total 28.75 mg 28.75 mg 27.5 mg   Sun 3.75 mg 3.75 mg 3.75 mg   Mon 5 mg 5 mg 5 mg   Tue 3.75 mg 3.75 mg 3.75 mg   Wed 3.75 mg 3.75 mg 3.75 mg   Thu 3.75 mg 3.75 mg 3.75 mg   Fri 5 mg 5 mg 3.75 mg   Sat 3.75 mg 3.75 mg  3.75 mg   Visit Report - - -   Some recent data might be hidden       Plan:  1. INR is Supratherapeutic today- see above in Anticoagulation Summary.  Will instruct Harry Ventura to Change their warfarin regimen- see above in Anticoagulation Summary.  2. Follow up in 2 weeks  3. Patient declines warfarin refills.  4. Verbal and written information provided. Patient expresses understanding and has no further questions at this time.    Ady Anderson RP

## 2018-12-13 RX ORDER — OMEPRAZOLE 20 MG/1
20 CAPSULE, DELAYED RELEASE ORAL
Qty: 30 CAPSULE | Refills: 2 | Status: SHIPPED | OUTPATIENT
Start: 2018-12-13 | End: 2019-04-24 | Stop reason: SDUPTHER

## 2018-12-28 ENCOUNTER — ANTICOAGULATION VISIT (OUTPATIENT)
Dept: PHARMACY | Facility: HOSPITAL | Age: 83
End: 2018-12-28

## 2018-12-28 LAB
INR PPP: 3.5 (ref 0.91–1.09)
PROTHROMBIN TIME: 41.4 SECONDS (ref 10–13.8)

## 2018-12-28 PROCEDURE — 85610 PROTHROMBIN TIME: CPT

## 2018-12-28 PROCEDURE — 36416 COLLJ CAPILLARY BLOOD SPEC: CPT

## 2018-12-28 PROCEDURE — G0463 HOSPITAL OUTPT CLINIC VISIT: HCPCS

## 2018-12-28 NOTE — PROGRESS NOTES
Anticoagulation Clinic Progress Note    Anticoagulation Summary  As of 12/28/2018    INR goal:   2.0-3.0   TTR:   69.6 % (1.2 y)   INR used for dosing:   3.5! (12/28/2018)   Warfarin maintenance plan:   5 mg on Mon; 3.75 mg all other days   Weekly warfarin total:   27.5 mg   Plan last modified:   Ady Anderson RP (12/28/2018)   Next INR check:   1/11/2019   Priority:   High   Target end date:   Indefinite    Indications    Atrial fibrillation (CMS/HCC) [I48.91]             Anticoagulation Episode Summary     INR check location:       Preferred lab:       Send INR reminders to:   [facility]    Comments:         Anticoagulation Care Providers     Provider Role Specialty Phone number    Nidhi Juarez MD Referring Cardiology [phone]          Clinic Interview:  Patient Findings     Positives:   Change in alcohol use    Negatives:   Signs/symptoms of thrombosis, Signs/symptoms of bleeding,   Laboratory test error suspected, Change in health, Change in activity,   Upcoming invasive procedure, Emergency department visit, Upcoming dental   procedure, Missed doses, Extra doses, Change in medications, Change in   diet/appetite, Hospital admission, Bruising, Other complaints    Comments:   Pt states he had some alcohol over the holiday (will increase   INR)      Clinical Outcomes     Negatives:   Major bleeding event, Thromboembolic event,   Anticoagulation-related hospital admission, Anticoagulation-related ED   visit, Anticoagulation-related fatality    Comments:   Pt states he had some alcohol over the holiday (will increase   INR)        INR History:  Anticoagulation Monitoring 11/13/2018 12/11/2018 12/28/2018   INR 2.9 3.3 3.5   INR Date 11/13/2018 12/11/2018 12/28/2018   INR Goal 2.0-3.0 2.0-3.0 2.0-3.0   Trend Same Down Same   Last Week Total 28.75 mg 28.75 mg 27.5 mg   Next Week Total 28.75 mg 27.5 mg 27.5 mg   Sun 3.75 mg 3.75 mg 3.75 mg   Mon 5 mg 5 mg 5 mg   Tue 3.75 mg 3.75 mg 3.75 mg    Wed 3.75 mg 3.75 mg 3.75 mg   Thu 3.75 mg 3.75 mg 3.75 mg   Fri 5 mg 3.75 mg 3.75 mg   Sat 3.75 mg 3.75 mg 3.75 mg   Visit Report - - -   Some recent data might be hidden     Pt did not adjust his dose from the last visit.  Instructed patient to now take 5mg on Monday ONLY and 3.75mg all other days    Plan:  1. INR is Supratherapeutic today- see above in Anticoagulation Summary.  Will instruct Harry Ventura to Change their warfarin regimen- see above in Anticoagulation Summary.  2. Follow up in 2 weeks  3. Patient declines warfarin refills.  4. Verbal and written information provided. Patient expresses understanding and has no further questions at this time.    Ady Anderson [facility]

## 2019-01-11 ENCOUNTER — ANTICOAGULATION VISIT (OUTPATIENT)
Dept: PHARMACY | Facility: HOSPITAL | Age: 84
End: 2019-01-11

## 2019-01-11 LAB
INR PPP: 1.6 (ref 0.91–1.09)
PROTHROMBIN TIME: 19.6 SECONDS (ref 10–13.8)

## 2019-01-11 PROCEDURE — 85610 PROTHROMBIN TIME: CPT

## 2019-01-11 PROCEDURE — G0463 HOSPITAL OUTPT CLINIC VISIT: HCPCS

## 2019-01-11 PROCEDURE — 36416 COLLJ CAPILLARY BLOOD SPEC: CPT

## 2019-01-11 NOTE — PROGRESS NOTES
Anticoagulation Clinic Progress Note    Anticoagulation Summary  As of 2019    INR goal:   2.0-3.0   TTR:   69.0 % (1.2 y)   INR used for dosin.6! (2019)   Warfarin maintenance plan:   5 mg on Mon, Fri; 3.75 mg all other days   Weekly warfarin total:   28.75 mg   Plan last modified:   Ady Anderson RP (2019)   Next INR check:   2019   Priority:   High   Target end date:   Indefinite    Indications    Atrial fibrillation (CMS/HCC) [I48.91]             Anticoagulation Episode Summary     INR check location:       Preferred lab:       Send INR reminders to:    [facility]    Comments:         Anticoagulation Care Providers     Provider Role Specialty Phone number    Nidhi Juarez MD Referring Cardiology [phone]          Clinic Interview:  Patient Findings     Negatives:   Signs/symptoms of thrombosis, Signs/symptoms of bleeding,   Laboratory test error suspected, Change in health, Change in alcohol use,   Change in activity, Upcoming invasive procedure, Emergency department   visit, Upcoming dental procedure, Missed doses, Extra doses, Change in   medications, Change in diet/appetite, Hospital admission, Bruising, Other   complaints      Clinical Outcomes     Negatives:   Major bleeding event, Thromboembolic event,   Anticoagulation-related hospital admission, Anticoagulation-related ED   visit, Anticoagulation-related fatality        INR History:  Anticoagulation Monitoring 2018   INR 3.3 3.5 1.6   INR Date 2018   INR Goal 2.0-3.0 2.0-3.0 2.0-3.0   Trend Down Same Up   Last Week Total 28.75 mg 27.5 mg 27.5 mg   Next Week Total 27.5 mg 27.5 mg 28.75 mg   Sun 3.75 mg 3.75 mg 3.75 mg   Mon 5 mg 5 mg 5 mg   Tue 3.75 mg 3.75 mg 3.75 mg   Wed 3.75 mg 3.75 mg 3.75 mg   Thu 3.75 mg 3.75 mg 3.75 mg   Fri 3.75 mg 3.75 mg 5 mg   Sat 3.75 mg 3.75 mg 3.75 mg   Visit Report - - -   Some recent data might be hidden       Plan:  1.  INR is Subtherapeutic today- see above in Anticoagulation Summary.  Will instruct Harry Ventura to Increase their warfarin regimen- see above in Anticoagulation Summary.  2. Follow up in 3 weeks (pt preferred 3 weeks to 2 weeks, seeing Dr. Juarez in two weeks)  3. Patient declines warfarin refills.  4. Verbal and written information provided. Patient expresses understanding and has no further questions at this time.    Ady Anderson [facility]

## 2019-01-25 ENCOUNTER — HOSPITAL ENCOUNTER (OUTPATIENT)
Dept: CARDIOLOGY | Facility: HOSPITAL | Age: 84
Discharge: HOME | End: 2019-01-25
Admitting: INTERNAL MEDICINE

## 2019-01-25 ENCOUNTER — OFFICE VISIT (OUTPATIENT)
Dept: CARDIOLOGY | Facility: CLINIC | Age: 84
End: 2019-01-25

## 2019-01-25 ENCOUNTER — CLINICAL SUPPORT NO REQUIREMENTS (OUTPATIENT)
Dept: CARDIOLOGY | Facility: CLINIC | Age: 84
End: 2019-01-25

## 2019-01-25 ENCOUNTER — TELEPHONE (OUTPATIENT)
Dept: CARDIOLOGY | Facility: CLINIC | Age: 84
End: 2019-01-25

## 2019-01-25 VITALS
BODY MASS INDEX: 22.76 KG/M2 | DIASTOLIC BLOOD PRESSURE: 70 MMHG | SYSTOLIC BLOOD PRESSURE: 132 MMHG | HEIGHT: 67 IN | HEART RATE: 62 BPM | WEIGHT: 145 LBS

## 2019-01-25 DIAGNOSIS — I48.0 PAROXYSMAL ATRIAL FIBRILLATION (HCC): ICD-10-CM

## 2019-01-25 DIAGNOSIS — I10 ESSENTIAL HYPERTENSION: ICD-10-CM

## 2019-01-25 DIAGNOSIS — Z95.0 PACEMAKER: ICD-10-CM

## 2019-01-25 DIAGNOSIS — R06.09 DYSPNEA ON EXERTION: Primary | ICD-10-CM

## 2019-01-25 DIAGNOSIS — I25.10 CORONARY ARTERY DISEASE INVOLVING NATIVE CORONARY ARTERY OF NATIVE HEART WITHOUT ANGINA PECTORIS: ICD-10-CM

## 2019-01-25 DIAGNOSIS — I27.20 PULMONARY HYPERTENSION (HCC): ICD-10-CM

## 2019-01-25 DIAGNOSIS — R06.02 SOB (SHORTNESS OF BREATH): Primary | ICD-10-CM

## 2019-01-25 DIAGNOSIS — I48.20 CHRONIC ATRIAL FIBRILLATION (HCC): Primary | ICD-10-CM

## 2019-01-25 LAB
ANION GAP SERPL CALCULATED.3IONS-SCNC: 17.5 MMOL/L
BUN BLD-MCNC: 34 MG/DL (ref 8–23)
BUN/CREAT SERPL: 25.4 (ref 7–25)
CALCIUM SPEC-SCNC: 9.8 MG/DL (ref 8.6–10.5)
CHLORIDE SERPL-SCNC: 99 MMOL/L (ref 98–107)
CO2 SERPL-SCNC: 25.5 MMOL/L (ref 22–29)
CREAT BLD-MCNC: 1.34 MG/DL (ref 0.76–1.27)
GFR SERPL CREATININE-BSD FRML MDRD: 50 ML/MIN/1.73
GLUCOSE BLD-MCNC: 93 MG/DL (ref 65–99)
NT-PROBNP SERPL-MCNC: 3117 PG/ML (ref 0–1800)
POTASSIUM BLD-SCNC: 4.6 MMOL/L (ref 3.5–5.2)
SODIUM BLD-SCNC: 142 MMOL/L (ref 136–145)

## 2019-01-25 PROCEDURE — 93000 ELECTROCARDIOGRAM COMPLETE: CPT | Performed by: INTERNAL MEDICINE

## 2019-01-25 PROCEDURE — 99214 OFFICE O/P EST MOD 30 MIN: CPT | Performed by: INTERNAL MEDICINE

## 2019-01-25 PROCEDURE — 83880 ASSAY OF NATRIURETIC PEPTIDE: CPT | Performed by: INTERNAL MEDICINE

## 2019-01-25 PROCEDURE — 93288 INTERROG EVL PM/LDLS PM IP: CPT | Performed by: INTERNAL MEDICINE

## 2019-01-25 PROCEDURE — 36415 COLL VENOUS BLD VENIPUNCTURE: CPT

## 2019-01-25 PROCEDURE — 80048 BASIC METABOLIC PNL TOTAL CA: CPT | Performed by: INTERNAL MEDICINE

## 2019-01-25 NOTE — PROGRESS NOTES
Date of Office Visit: 2019  Encounter Provider: Nidhi Juarez MD  Place of Service: [facility] CARDIOLOGY  Patient Name: Harry Ventura  :1930    Chief complaint  Follow-up coronary artery disease, atrial fibrillation, diastolic heart failure, hypertension, pacemaker.    History of Present Illness  Patient is an 86-year-old gentleman with history of hypertension, hyperlipidemia, deep venous thrombosis, chronic lymphocytic leukemia, atrial fibrillation with sick sinus syndrome and pacemaker placement.  He also has a history of pulmonary hypertension.  In  he was admitted with medical noncompliance and dietary noncompliance and heart failure.  He was noted to have pulmonary hypertension however by 2015 this had progressed with an RV systolic pressure of 67 mmHg in the setting of diastolic dysfunction.  In 2015 he developed bradycardia and had a pacemaker placed.  By 2016.  Normal left dripper size and systolic function with mild aortic regurgitation mild to moderate tricuspid regurgitation with an RV systolic pressure 68 mmHg.  Right heart catheterization revealed significant diastolic dysfunction and elevated biventricular pressures with mild pulmonary hypertension and mild coronary artery disease and diuretics were added.  In 2017 with facial droop and a TIA.  His INR was subtherapeutic at 1.2.  He was given Lovenox until his INR became therapeutic. Low-dose aspirin and statin therapy were also added.  He was some discussion about changing to Eliquis however the patient did not wish to do so due to the cost of the drug.  An echocardiogram was also performed that revealed normal systolic function with an ejection fraction 63%, mild mitral regurgitation, trivial to mild aortic valve regurgitation and no evidence of intra-atrial shunting with saline injection and RVVP was 49 mmHg.    In 2018 he was admitted to [facility] with probable  aspiration pneumonia, treated with antibiotics.  An echocardiogram showed normal systolic function with right ventricular enlargement with moderate right ventricle systolic dysfunction with mild to moderate aortic valve stenosis, mild aortic regurgitation and RV systolic pressure of 51 mmHg.  The aortic root was felt to be mildly dilated at 3.6 cm.    Since dismissal he has had no chest pain lightheadedness syncope.  He has mild dependent edema and mild dyspnea on exertion that is back to baseline.  He denies any palpitations.     Past Medical History:   Diagnosis Date   • Accelerated essential hypertension    • Anemia    • Atrial fibrillation (CMS/[facility])    • CAP (community acquired pneumonia)    • CHF (congestive heart failure) (CMS/[facility])    • Chronic anticoagulation    • Chronic diastolic CHF (congestive heart failure) (CMS/[facility])    • CLL (chronic lymphocytic leukemia) (CMS/[facility])     Stage 0   • COPD (chronic obstructive pulmonary disease) (CMS/[facility])    • Coronary artery disease    • ED (erectile dysfunction) of non-organic origin    • Gait instability    • GERD (gastroesophageal reflux disease)    • Glucose intolerance (malabsorption)    • Hematuria    • History of thrombocytopenia     Mild   • Hypercholesterolemia    • Hypertension     Pulmonary   • Hypogonadism male    • Insomnia    • Iron overload    • Low back pain    • Macrocytosis    • Pacemaker    • Personal history of CLL (chronic lymphocytic leukemia)    • Personal history of venous thrombosis and embolism    • Pleural effusion    • Renal insufficiency    • Seborrheic dermatitis    • SSS (sick sinus syndrome) (CMS/[facility])    • TIA (transient ischemic attack) 2017     Past Surgical History:   Procedure Laterality Date   • CARDIAC CATHETERIZATION N/A 6/17/2016    Procedure: Right and Left Heart Cath;  Surgeon: Harry Bolton MD;  Location: [facility] INVASIVE LOCATION;  Service:    • CARDIAC CATHETERIZATION N/A 6/17/2016    Procedure: Coronary angiography;  Surgeon:  Harry Bolton MD;  Location: [facility] CATH INVASIVE LOCATION;  Service:    • CARDIAC CATHETERIZATION N/A 6/17/2016    Procedure: Left Heart Cath;  Surgeon: Harry Bolton MD;  Location: [facility] CATH INVASIVE LOCATION;  Service:    • CARDIAC CATHETERIZATION N/A 6/17/2016    Procedure: Left ventriculography;  Surgeon: Harry Bolton MD;  Location: [facility] CATH INVASIVE LOCATION;  Service:    • CHOLECYSTECTOMY  1983   • PACEMAKER IMPLANTATION     • THORACOSCOPY Right 4/20/2018    Procedure: BRONCHOSCOPY, RIGHT VIDEO ASSISTED THORACOSCOPY, RIGHT LUNG DECORTICATION WITH PLEURAL BIOPSY, PLEURODESIS;  Surgeon: Erich Serrano III, MD;  Location: [facility] OR;  Service: Cardiothoracic   • UMBILICAL HERNIA REPAIR  2006    Performed laparoscopically by Dr. Garcia.     Outpatient Medications Prior to Visit   Medication Sig Dispense Refill   • aspirin 81 MG EC tablet Take 81 mg by mouth Daily.     • atorvastatin (LIPITOR) 40 MG tablet Take 40 mg by mouth Daily.     • fluticasone (FLONASE) 50 MCG/ACT nasal spray 2 sprays into the nostril(s) as directed by provider As Needed.     • furosemide (LASIX) 40 MG tablet Take 1 tablet by mouth Every Morning. 90 tablet 3   • O2 (OXYGEN) Inhale 2 L/min 1 (One) Time. As needed     • omeprazole (priLOSEC) 20 MG capsule Take 1 capsule by mouth Every Morning Before Breakfast. 30 capsule 2   • potassium chloride (K-DUR,KLOR-CON) 10 MEQ CR tablet TAKE 1 TABLET BY MOUTH TWO TIMES A DAY  (Patient taking differently: TAKE 1 TABLET BY MOUTH TWO TIMES A DAY  - currently taking one tablet daily) 180 tablet 0   • warfarin (JANTOVEN) 2.5 MG tablet Take 2 (5mg) po 2 days a week and 1.5 (3.75mg) po 5 days a week 60 tablet 5   • metoprolol tartrate (LOPRESSOR) 50 MG tablet Take 50 mg by mouth Every Morning. Note: Not on file at pharmacy, but patient reports this is the dose he takes.       No facility-administered medications prior to visit.        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   Tobacco Use   • Smoking status: Former Smoker     Packs/day: 0.00     Years: 20.00     Pack years: 0.00   • Smokeless tobacco: Never Used   • Tobacco comment: Quit smoking over 30 years ago. //  caffeine use   Substance and Sexual Activity   • Alcohol use: Yes     Alcohol/week: 1.8 oz     Types: 3 Shots of liquor per week     Comment: Three drinks a day wine or vodka, reporting 28 drinks a week.    • Drug use: No   • Sexual activity: Yes     Partners: Female   Other Topics Concern   • Not on file   Social History Narrative    Stays fairly active for his age, walking and golfing.      Family History   Problem Relation Age of Onset   • Emphysema Father    • Emphysema Sister    • Asthma Sister    • Coronary artery disease Other    • No Known Problems Mother    • Liver cancer Paternal Uncle 55   • Liver cancer Paternal Uncle 74   • Liver cancer Paternal Uncle 79   • Malig Hyperthermia Neg Hx      Review of Systems   Constitution: Negative for fever, malaise/fatigue, weight gain and weight loss.   HENT: Negative for ear pain, hearing loss, nosebleeds and sore throat.    Eyes: Negative for double vision, pain, vision loss in left eye and vision loss in right eye.   Cardiovascular: Positive for dyspnea on exertion.        See history of present illness.   Respiratory: Negative for cough, shortness of breath, sleep disturbances due to breathing, snoring and wheezing.    Endocrine: Negative for cold  "intolerance, heat intolerance and polyuria.   Skin: Negative for itching, poor wound healing and rash.   Musculoskeletal: Negative for joint pain, joint swelling and myalgias.   Gastrointestinal: Negative for abdominal pain, diarrhea, hematochezia, nausea and vomiting.   Genitourinary: Negative for hematuria and hesitancy.   Neurological: Negative for numbness, paresthesias and seizures.   Psychiatric/Behavioral: Negative for depression. The patient is not nervous/anxious.         Objective:     Vitals:    01/25/19 1210   BP: 132/70   Pulse: 62   Weight: 65.8 kg (145 lb)   Height: 170.2 cm (67\")     Body mass index is 22.71 kg/m².    Physical Exam   Constitutional: He is oriented to person, place, and time. He appears well-developed and well-nourished.   HENT:   Head: Normocephalic.   Nose: Nose normal.   Mouth/Throat: Oropharynx is clear and moist.   Eyes: Conjunctivae and EOM are normal. Pupils are equal, round, and reactive to light. Right eye exhibits no discharge. No scleral icterus.   Neck: Normal range of motion. Neck supple. No JVD present. No thyromegaly present.   Cardiovascular: Normal rate, regular rhythm and intact distal pulses. Exam reveals no gallop and no friction rub.   Murmur heard.   Harsh midsystolic murmur is present with a grade of 2/6 at the upper right sternal border radiating to the neck.  Pulses:       Carotid pulses are 2+ on the right side, and 2+ on the left side.       Radial pulses are 2+ on the right side, and 2+ on the left side.        Femoral pulses are 2+ on the right side, and 2+ on the left side.       Popliteal pulses are 2+ on the right side, and 2+ on the left side.        Dorsalis pedis pulses are 2+ on the right side, and 2+ on the left side.        Posterior tibial pulses are 2+ on the right side, and 2+ on the left side.   Trace edema   Pulmonary/Chest: Effort normal and breath sounds normal. No respiratory distress. He has no wheezes. He has no rales.   Abdominal: Soft. " Bowel sounds are normal. He exhibits no distension. There is no hepatosplenomegaly. There is no tenderness. There is no rebound.   Musculoskeletal: Normal range of motion. He exhibits edema. He exhibits no tenderness.   Neurological: He is alert and oriented to person, place, and time.   Skin: Skin is warm and dry. No rash noted. No erythema.   Psychiatric: He has a normal mood and affect. His behavior is normal. Judgment and thought content normal.   Vitals reviewed.    Lab Review:     ECG 12 Lead  Date/Time: 1/25/2019 12:23 PM  Performed by: Nidhi Juarez MD  Authorized by: Nidhi Juarez MD   Comparison: compared with previous ECG   Similar to previous ECG  Rhythm: atrial fibrillation  Rhythm comments: Intermittent ventricular pacing  Clinical impression: abnormal ECG          Assessment:       Diagnosis Plan   1. SOB (shortness of breath)  ECG 12 Lead    XR Chest 2 View    proBNP   2. Pulmonary hypertension (CMS/HCC)     3. Pacemaker     4. Essential hypertension     5. Coronary artery disease involving native coronary artery of native heart without angina pectoris     6. Paroxysmal atrial fibrillation (CMS/HCC)       Plan:       1.  Pulmonary hypertension.   2.  Hypertension.  Controlled  3.  Persistent atrial fibrillation, rates under reasonable control  We'll continue with warfarin.   4.  Mild coronary artery disease.  He has no anginal symptoms.  5.  Sick sinus syndrome, s/p pacer placement.  Brief episodes of ventricular rates are noted on device check but otherwise the rhythm has been stable.  6.  Chronic cough and dyspnea.  He states this is been present for a long time and it is unchanged.  It is not productive he has no fever or chills.Will check a BNP.    Atrial Fibrillation and Atrial Flutter  Assessment  • The patient has persistent atrial fibrillation  • This is non-valvular in etiology  • The patient's CHADS2-VASc score is 4  • A [id number] score of 2 or more is considered a high risk for a  thromboembolic event  • Warfarin prescribed  • Aspirin prescribed    Plan  • Continue in atrial fibrillation with rate control  • Continue warfarin for antithrombotic therapy, bleeding issues discussed  • Continue beta blocker for rhythm control       Your medication list           Accurate as of 1/25/19 11:59 PM. If you have any questions, ask your nurse or doctor.               CHANGE how you take these medications      Instructions Last Dose Given Next Dose Due   metoprolol tartrate 25 MG tablet  Commonly known as:  LOPRESSOR  What changed:    · medication strength  · how much to take  · when to take this  · additional instructions  Changed by:  Nidhi Juarez MD      Take 1 tablet by mouth 2 (Two) Times a Day.       potassium chloride 10 MEQ CR tablet  Commonly known as:  K-DUR,KLOR-CON  What changed:    · how much to take  · how to take this  · when to take this      TAKE 1 TABLET BY MOUTH TWO TIMES A DAY          CONTINUE taking these medications      Instructions Last Dose Given Next Dose Due   aspirin 81 MG EC tablet      Take 81 mg by mouth Daily.       atorvastatin 40 MG tablet  Commonly known as:  LIPITOR      Take 40 mg by mouth Daily.       fluticasone 50 MCG/ACT nasal spray  Commonly known as:  FLONASE      2 sprays into the nostril(s) as directed by provider As Needed.       furosemide 40 MG tablet  Commonly known as:  LASIX      Take 1 tablet by mouth Every Morning.       O2  Commonly known as:  OXYGEN      Inhale 2 L/min 1 (One) Time. As needed       omeprazole 20 MG capsule  Commonly known as:  priLOSEC      Take 1 capsule by mouth Every Morning Before Breakfast.       warfarin 2.5 MG tablet  Commonly known as:  JANTOVEN      Take 2 (5mg) po 2 days a week and 1.5 (3.75mg) po 5 days a week             Where to Get Your Medications      These medications were sent to [id number] - [place], [place] - [address] - [phone]  - [phone] FX  [address]  [place] [address]    Phone:  [phone]   · metoprolol tartrate 25 MG tablet       Dictated utilizing Dragon dictation

## 2019-01-27 ENCOUNTER — TELEPHONE (OUTPATIENT)
Dept: CARDIOLOGY | Facility: CLINIC | Age: 84
End: 2019-01-27

## 2019-01-27 DIAGNOSIS — N28.9 RENAL INSUFFICIENCY: Primary | ICD-10-CM

## 2019-02-01 ENCOUNTER — ANTICOAGULATION VISIT (OUTPATIENT)
Dept: PHARMACY | Facility: HOSPITAL | Age: 84
End: 2019-02-01

## 2019-02-01 LAB
INR PPP: 2.1 (ref 0.91–1.09)
PROTHROMBIN TIME: 25 SECONDS (ref 10–13.8)

## 2019-02-01 PROCEDURE — 85610 PROTHROMBIN TIME: CPT

## 2019-02-01 PROCEDURE — 36416 COLLJ CAPILLARY BLOOD SPEC: CPT

## 2019-02-01 NOTE — PROGRESS NOTES
Anticoagulation Clinic Progress Note    Anticoagulation Summary  As of 2019    INR goal:   2.0-3.0   TTR:   66.8 % (1.3 y)   INR used for dosin.1 (2019)   Warfarin maintenance plan:   5 mg on Mon, Fri; 3.75 mg all other days   Weekly warfarin total:   28.75 mg   No change documented:   Keri Cardoso   Plan last modified:   Ady Anderson RPH (2019)   Next INR check:   3/1/2019   Priority:   High   Target end date:   Indefinite    Indications    Atrial fibrillation (CMS/HCC) [I48.91]             Anticoagulation Episode Summary     INR check location:       Preferred lab:       Send INR reminders to:   [facility] [place]    Comments:         Anticoagulation Care Providers     Provider Role Specialty Phone number    Nidhi Juarez MD Referring Cardiology [phone]          Clinic Interview:  Patient Findings     Positives:   Missed doses    Negatives:   Signs/symptoms of thrombosis, Signs/symptoms of bleeding,   Laboratory test error suspected, Change in health, Change in alcohol use,   Change in activity, Upcoming invasive procedure, Emergency department   visit, Upcoming dental procedure, Extra doses, Change in medications,   Change in diet/appetite, Hospital admission, Bruising, Other complaints    Comments:   Missed possible dose this week      Clinical Outcomes     Negatives:   Major bleeding event, Thromboembolic event,   Anticoagulation-related hospital admission, Anticoagulation-related ED   visit, Anticoagulation-related fatality    Comments:   Missed possible dose this week        INR History:  Anticoagulation Monitoring 2018   INR 3.5 1.6 2.1   INR Date 2018   INR Goal 2.0-3.0 2.0-3.0 2.0-3.0   Trend Same Up Same   Last Week Total 27.5 mg 27.5 mg 28.75 mg   Next Week Total 27.5 mg 28.75 mg 28.75 mg   Sun 3.75 mg 3.75 mg 3.75 mg   Mon 5 mg 5 mg 5 mg   Tue 3.75 mg 3.75 mg 3.75 mg   Wed 3.75 mg 3.75 mg 3.75 mg   Thu 3.75 mg  3.75 mg 3.75 mg   Fri 3.75 mg 5 mg 5 mg   Sat 3.75 mg 3.75 mg 3.75 mg   Visit Report - - -   Some recent data might be hidden       Plan:  1. INR is therapeutic today- see above in Anticoagulation Summary.   Will instruct Harry Ventura to continue their warfarin regimen- see above in Anticoagulation Summary.  2. Follow up in 4 weeks.  3. Patient declines warfarin refills.  4. Verbal and written information provided. Patient expresses understanding and has no further questions at this time.    Keri Cardoso

## 2019-02-04 ENCOUNTER — HOSPITAL ENCOUNTER (OUTPATIENT)
Dept: GENERAL RADIOLOGY | Facility: HOSPITAL | Age: 84
Discharge: HOME | End: 2019-02-04
Attending: INTERNAL MEDICINE | Admitting: INTERNAL MEDICINE

## 2019-02-04 DIAGNOSIS — R06.02 SOB (SHORTNESS OF BREATH): ICD-10-CM

## 2019-02-04 PROCEDURE — 71046 X-RAY EXAM CHEST 2 VIEWS: CPT

## 2019-02-07 ENCOUNTER — TELEPHONE (OUTPATIENT)
Dept: CARDIOLOGY | Facility: CLINIC | Age: 84
End: 2019-02-07

## 2019-02-18 DIAGNOSIS — I25.10 CORONARY ARTERY DISEASE INVOLVING NATIVE CORONARY ARTERY OF NATIVE HEART WITHOUT ANGINA PECTORIS: ICD-10-CM

## 2019-02-18 DIAGNOSIS — E78.00 PURE HYPERCHOLESTEROLEMIA: Primary | ICD-10-CM

## 2019-02-18 RX ORDER — ATORVASTATIN CALCIUM 40 MG/1
40 TABLET, FILM COATED ORAL DAILY
Qty: 90 TABLET | Refills: 3 | Status: SHIPPED | OUTPATIENT
Start: 2019-02-18 | End: 2020-05-04 | Stop reason: SDUPTHER

## 2019-02-21 ENCOUNTER — TRANSCRIBE ORDERS (OUTPATIENT)
Dept: ADMINISTRATIVE | Facility: HOSPITAL | Age: 84
End: 2019-02-21

## 2019-02-21 DIAGNOSIS — N18.30 CKD (CHRONIC KIDNEY DISEASE) STAGE 3, GFR 30-59 ML/MIN (HCC): Primary | ICD-10-CM

## 2019-03-01 ENCOUNTER — HOSPITAL ENCOUNTER (OUTPATIENT)
Dept: ULTRASOUND IMAGING | Facility: HOSPITAL | Age: 84
Discharge: HOME | End: 2019-03-01
Admitting: INTERNAL MEDICINE

## 2019-03-01 ENCOUNTER — LAB (OUTPATIENT)
Dept: LAB | Facility: HOSPITAL | Age: 84
End: 2019-03-01

## 2019-03-01 ENCOUNTER — TRANSCRIBE ORDERS (OUTPATIENT)
Dept: LAB | Facility: HOSPITAL | Age: 84
End: 2019-03-01

## 2019-03-01 ENCOUNTER — ANTICOAGULATION VISIT (OUTPATIENT)
Dept: PHARMACY | Facility: HOSPITAL | Age: 84
End: 2019-03-01

## 2019-03-01 DIAGNOSIS — N18.30 CKD (CHRONIC KIDNEY DISEASE) STAGE 3, GFR 30-59 ML/MIN (HCC): ICD-10-CM

## 2019-03-01 DIAGNOSIS — N18.30 CHRONIC RENAL DISEASE, STAGE III (HCC): Primary | ICD-10-CM

## 2019-03-01 DIAGNOSIS — N18.30 CHRONIC RENAL DISEASE, STAGE III (HCC): ICD-10-CM

## 2019-03-01 DIAGNOSIS — I10 ESSENTIAL HYPERTENSION, MALIGNANT: ICD-10-CM

## 2019-03-01 LAB
25(OH)D3 SERPL-MCNC: 34.5 NG/ML (ref 30–100)
ALBUMIN SERPL-MCNC: 3.6 G/DL (ref 3.5–5.2)
ALBUMIN/GLOB SERPL: 1 G/DL
ALP SERPL-CCNC: 104 U/L (ref 39–117)
ALT SERPL W P-5'-P-CCNC: 19 U/L (ref 1–41)
ANION GAP SERPL CALCULATED.3IONS-SCNC: 12 MMOL/L
AST SERPL-CCNC: 28 U/L (ref 1–40)
BASOPHILS # BLD AUTO: 0.06 10*3/MM3 (ref 0–0.2)
BASOPHILS NFR BLD AUTO: 0.9 % (ref 0–1.5)
BILIRUB SERPL-MCNC: 0.6 MG/DL (ref 0.1–1.2)
BILIRUB UR QL STRIP: NEGATIVE
BUN BLD-MCNC: 26 MG/DL (ref 8–23)
BUN/CREAT SERPL: 19.8 (ref 7–25)
CALCIUM SPEC-SCNC: 9 MG/DL (ref 8.6–10.5)
CHLORIDE SERPL-SCNC: 99 MMOL/L (ref 98–107)
CK SERPL-CCNC: 29 U/L (ref 20–200)
CLARITY UR: CLEAR
CO2 SERPL-SCNC: 28 MMOL/L (ref 22–29)
COLOR UR: ABNORMAL
CREAT BLD-MCNC: 1.31 MG/DL (ref 0.76–1.27)
DEPRECATED RDW RBC AUTO: 52 FL (ref 37–54)
EOSINOPHIL # BLD AUTO: 0.32 10*3/MM3 (ref 0–0.4)
EOSINOPHIL NFR BLD AUTO: 4.6 % (ref 0.3–6.2)
ERYTHROCYTE [DISTWIDTH] IN BLOOD BY AUTOMATED COUNT: 14.7 % (ref 12.3–15.4)
GFR SERPL CREATININE-BSD FRML MDRD: 52 ML/MIN/1.73
GLOBULIN UR ELPH-MCNC: 3.5 GM/DL
GLUCOSE BLD-MCNC: 99 MG/DL (ref 65–99)
GLUCOSE UR STRIP-MCNC: NEGATIVE MG/DL
HCT VFR BLD AUTO: 38.9 % (ref 37.5–51)
HGB BLD-MCNC: 12 G/DL (ref 13–17.7)
HGB UR QL STRIP.AUTO: NEGATIVE
IMM GRANULOCYTES # BLD AUTO: 0.02 10*3/MM3 (ref 0–0.05)
IMM GRANULOCYTES NFR BLD AUTO: 0.3 % (ref 0–0.5)
INR PPP: 2.2 (ref 0.91–1.09)
KETONES UR QL STRIP: NEGATIVE
LEUKOCYTE ESTERASE UR QL STRIP.AUTO: NEGATIVE
LYMPHOCYTES # BLD AUTO: 1.66 10*3/MM3 (ref 0.7–3.1)
LYMPHOCYTES NFR BLD AUTO: 23.9 % (ref 19.6–45.3)
MAGNESIUM SERPL-MCNC: 1.5 MG/DL (ref 1.6–2.4)
MCH RBC QN AUTO: 29.7 PG (ref 26.6–33)
MCHC RBC AUTO-ENTMCNC: 30.8 G/DL (ref 31.5–35.7)
MCV RBC AUTO: 96.3 FL (ref 79–97)
MONOCYTES # BLD AUTO: 0.66 10*3/MM3 (ref 0.1–0.9)
MONOCYTES NFR BLD AUTO: 9.5 % (ref 5–12)
NEUTROPHILS # BLD AUTO: 4.22 10*3/MM3 (ref 1.4–7)
NEUTROPHILS NFR BLD AUTO: 60.8 % (ref 42.7–76)
NITRITE UR QL STRIP: NEGATIVE
NRBC BLD AUTO-RTO: 0 /100 WBC (ref 0–0)
PH UR STRIP.AUTO: <=5 [PH] (ref 5–8)
PHOSPHATE SERPL-MCNC: 3 MG/DL (ref 2.5–4.5)
PLATELET # BLD AUTO: 173 10*3/MM3 (ref 140–450)
PMV BLD AUTO: 11 FL (ref 6–12)
POTASSIUM BLD-SCNC: 4.2 MMOL/L (ref 3.5–5.2)
PROT SERPL-MCNC: 7.1 G/DL (ref 6–8.5)
PROT UR QL STRIP: NEGATIVE
PROTHROMBIN TIME: 26.3 SECONDS (ref 10–13.8)
PTH-INTACT SERPL-MCNC: 105 PG/ML (ref 15–65)
RBC # BLD AUTO: 4.04 10*6/MM3 (ref 4.14–5.8)
SODIUM BLD-SCNC: 139 MMOL/L (ref 136–145)
SP GR UR STRIP: 1.02 (ref 1–1.03)
URATE SERPL-MCNC: 9.9 MG/DL (ref 3.4–7)
UROBILINOGEN UR QL STRIP: ABNORMAL
WBC NRBC COR # BLD: 6.94 10*3/MM3 (ref 3.4–10.8)

## 2019-03-01 PROCEDURE — 85610 PROTHROMBIN TIME: CPT

## 2019-03-01 PROCEDURE — 82550 ASSAY OF CK (CPK): CPT

## 2019-03-01 PROCEDURE — 36416 COLLJ CAPILLARY BLOOD SPEC: CPT

## 2019-03-01 PROCEDURE — 82306 VITAMIN D 25 HYDROXY: CPT

## 2019-03-01 PROCEDURE — 76775 US EXAM ABDO BACK WALL LIM: CPT

## 2019-03-01 PROCEDURE — 84550 ASSAY OF BLOOD/URIC ACID: CPT

## 2019-03-01 PROCEDURE — 83970 ASSAY OF PARATHORMONE: CPT

## 2019-03-01 PROCEDURE — 83735 ASSAY OF MAGNESIUM: CPT

## 2019-03-01 PROCEDURE — 85025 COMPLETE CBC W/AUTO DIFF WBC: CPT

## 2019-03-01 PROCEDURE — 86335 IMMUNFIX E-PHORSIS/URINE/CSF: CPT

## 2019-03-01 PROCEDURE — 82784 ASSAY IGA/IGD/IGG/IGM EACH: CPT

## 2019-03-01 PROCEDURE — 80053 COMPREHEN METABOLIC PANEL: CPT

## 2019-03-01 PROCEDURE — 81003 URINALYSIS AUTO W/O SCOPE: CPT

## 2019-03-01 PROCEDURE — 86334 IMMUNOFIX E-PHORESIS SERUM: CPT

## 2019-03-01 PROCEDURE — 84100 ASSAY OF PHOSPHORUS: CPT

## 2019-03-01 PROCEDURE — 36415 COLL VENOUS BLD VENIPUNCTURE: CPT

## 2019-03-01 NOTE — PROGRESS NOTES
Anticoagulation Clinic Progress Note    Anticoagulation Summary  As of 3/1/2019    INR goal:   2.0-3.0   TTR:   68.7 % (1.4 y)   INR used for dosin.2 (3/1/2019)   Warfarin maintenance plan:   5 mg on Mon, Fri; 3.75 mg all other days   Weekly warfarin total:   28.75 mg   No change documented:   Keri Cardoso   Plan last modified:   Ady Anderson RPH (2019)   Next INR check:   3/29/2019   Priority:   High   Target end date:   Indefinite    Indications    Atrial fibrillation (CMS/HCC) [I48.91]             Anticoagulation Episode Summary     INR check location:       Preferred lab:       Send INR reminders to:    [facility]    Comments:         Anticoagulation Care Providers     Provider Role Specialty Phone number    Nidhi Juarez MD Referring Cardiology [phone]          Clinic Interview:  Patient Findings     Negatives:   Signs/symptoms of thrombosis, Signs/symptoms of bleeding,   Laboratory test error suspected, Change in health, Change in alcohol use,   Change in activity, Upcoming invasive procedure, Emergency department   visit, Upcoming dental procedure, Missed doses, Extra doses, Change in   medications, Change in diet/appetite, Hospital admission, Bruising, Other   complaints      Clinical Outcomes     Negatives:   Major bleeding event, Thromboembolic event,   Anticoagulation-related hospital admission, Anticoagulation-related ED   visit, Anticoagulation-related fatality        INR History:  Anticoagulation Monitoring 2019 2019 3/1/2019   INR 1.6 2.1 2.2   INR Date 2019 2019 3/1/2019   INR Goal 2.0-3.0 2.0-3.0 2.0-3.0   Trend Up Same Same   Last Week Total 27.5 mg 28.75 mg 28.75 mg   Next Week Total 28.75 mg 28.75 mg 28.75 mg   Sun 3.75 mg 3.75 mg 3.75 mg   Mon 5 mg 5 mg 5 mg   Tue 3.75 mg 3.75 mg 3.75 mg   Wed 3.75 mg 3.75 mg 3.75 mg   Thu 3.75 mg 3.75 mg 3.75 mg   Fri 5 mg 5 mg 5 mg   Sat 3.75 mg 3.75 mg 3.75 mg   Visit Report - - -   Some recent data  might be hidden       Plan:  1. INR is therapeutic today- see above in Anticoagulation Summary.   Will instruct Harry Ventura to continue their warfarin regimen- see above in Anticoagulation Summary.  2. Follow up in 4 weeks.  3. Patient declines warfarin refills.  4. Verbal and written information provided. Patient expresses understanding and has no further questions at this time.    Keri Cardoso

## 2019-03-04 LAB
IGA SERPL-MCNC: 386 MG/DL (ref 61–437)
IGG SERPL-MCNC: 1238 MG/DL (ref 700–1600)
IGM SERPL-MCNC: 78 MG/DL (ref 15–143)
PROT PATTERN SERPL IFE-IMP: NORMAL

## 2019-03-05 LAB — INTERPRETATION UR IFE-IMP: NORMAL

## 2019-03-13 RX ORDER — OMEPRAZOLE 20 MG/1
CAPSULE, DELAYED RELEASE ORAL
Qty: 30 CAPSULE | Refills: 1 | OUTPATIENT
Start: 2019-03-13

## 2019-04-02 ENCOUNTER — ANTICOAGULATION VISIT (OUTPATIENT)
Dept: PHARMACY | Facility: HOSPITAL | Age: 84
End: 2019-04-02

## 2019-04-02 LAB
INR PPP: 2.2 (ref 0.91–1.09)
PROTHROMBIN TIME: 26.7 SECONDS (ref 10–13.8)

## 2019-04-02 PROCEDURE — 85610 PROTHROMBIN TIME: CPT

## 2019-04-02 PROCEDURE — 36416 COLLJ CAPILLARY BLOOD SPEC: CPT

## 2019-04-02 NOTE — PROGRESS NOTES
Anticoagulation Clinic Progress Note    Anticoagulation Summary  As of 2019    INR goal:   2.0-3.0   TTR:   70.6 % (1.4 y)   INR used for dosin.2 (2019)   Warfarin maintenance plan:   5 mg every Mon, Fri; 3.75 mg all other days   Weekly warfarin total:   28.75 mg   No change documented:   Priscilla Mccall   Plan last modified:   Ady Anderson RPH (2019)   Next INR check:   2019   Priority:   Maintenance   Target end date:   Indefinite    Indications    Atrial fibrillation (CMS/HCC) [I48.91]             Anticoagulation Episode Summary     INR check location:       Preferred lab:       Send INR reminders to:   [facility]    Comments:         Anticoagulation Care Providers     Provider Role Specialty Phone number    Nidhi Juarez MD Referring Cardiology [phone]          Clinic Interview:  Patient Findings     Negatives:   Signs/symptoms of thrombosis, Signs/symptoms of bleeding,   Laboratory test error suspected, Change in health, Change in alcohol use,   Change in activity, Upcoming invasive procedure, Emergency department   visit, Upcoming dental procedure, Missed doses, Extra doses, Change in   medications, Change in diet/appetite, Hospital admission, Bruising, Other   complaints      Clinical Outcomes     Negatives:   Major bleeding event, Thromboembolic event,   Anticoagulation-related hospital admission, Anticoagulation-related ED   visit, Anticoagulation-related fatality        INR History:  Anticoagulation Monitoring 2019 3/1/2019 2019   INR 2.1 2.2 2.2   INR Date 2019 3/1/2019 2019   INR Goal 2.0-3.0 2.0-3.0 2.0-3.0   Trend Same Same Same   Last Week Total 28.75 mg 28.75 mg 28.75 mg   Next Week Total 28.75 mg 28.75 mg 28.75 mg   Sun 3.75 mg 3.75 mg 3.75 mg   Mon 5 mg 5 mg 5 mg   Tue 3.75 mg 3.75 mg 3.75 mg   Wed 3.75 mg 3.75 mg 3.75 mg   Thu 3.75 mg 3.75 mg 3.75 mg   Fri 5 mg 5 mg 5 mg   Sat 3.75 mg 3.75 mg 3.75 mg   Visit Report - - -    Some recent data might be hidden       Plan:  1. INR is therapeutic today- see above in Anticoagulation Summary.   Will instruct Harry Ventura to continue their warfarin regimen- see above in Anticoagulation Summary.  2. Follow up in 4 weeks.  3. Patient declines warfarin refills.  4. Verbal and written information provided. Patient expresses understanding and has no further questions at this time.    Priscilla Mccall

## 2019-04-12 RX ORDER — OMEPRAZOLE 20 MG/1
CAPSULE, DELAYED RELEASE ORAL
Qty: 30 CAPSULE | Refills: 1 | OUTPATIENT
Start: 2019-04-12

## 2019-04-25 RX ORDER — OMEPRAZOLE 20 MG/1
CAPSULE, DELAYED RELEASE ORAL
Qty: 30 CAPSULE | Refills: 1 | Status: SHIPPED | OUTPATIENT
Start: 2019-04-25 | End: 2019-06-11 | Stop reason: SDUPTHER

## 2019-04-30 ENCOUNTER — ANTICOAGULATION VISIT (OUTPATIENT)
Dept: PHARMACY | Facility: HOSPITAL | Age: 84
End: 2019-04-30

## 2019-04-30 LAB
INR PPP: 2.1 (ref 0.91–1.09)
PROTHROMBIN TIME: 24.9 SECONDS (ref 10–13.8)

## 2019-04-30 PROCEDURE — 36416 COLLJ CAPILLARY BLOOD SPEC: CPT

## 2019-04-30 PROCEDURE — 85610 PROTHROMBIN TIME: CPT

## 2019-04-30 NOTE — PROGRESS NOTES
Anticoagulation Clinic Progress Note    Anticoagulation Summary  As of 2019    INR goal:   2.0-3.0   TTR:   72.1 % (1.5 y)   INR used for dosin.1 (2019)   Warfarin maintenance plan:   5 mg every Mon, Fri; 3.75 mg all other days   Weekly warfarin total:   28.75 mg   No change documented:   Priscilla Mccall   Plan last modified:   Ady Anderson [facility] (2019)   Next INR check:   2019   Priority:   Maintenance   Target end date:   Indefinite    Indications    Atrial fibrillation (CMS/HCC) [I48.91]             Anticoagulation Episode Summary     INR check location:       Preferred lab:       Send INR reminders to:    [facility]    Comments:         Anticoagulation Care Providers     Provider Role Specialty Phone number    Nidhi Juarez MD Referring Cardiology [phone]          Clinic Interview:      INR History:  Anticoagulation Monitoring 3/1/2019 2019 2019   INR 2.2 2.2 2.1   INR Date 3/1/2019 2019 2019   INR Goal 2.0-3.0 2.0-3.0 2.0-3.0   Trend Same Same Same   Last Week Total 28.75 mg 28.75 mg 28.75 mg   Next Week Total 28.75 mg 28.75 mg 28.75 mg   Sun 3.75 mg 3.75 mg 3.75 mg   Mon 5 mg 5 mg 5 mg   Tue 3.75 mg 3.75 mg 3.75 mg   Wed 3.75 mg 3.75 mg 3.75 mg   Thu 3.75 mg 3.75 mg 3.75 mg   Fri 5 mg 5 mg 5 mg   Sat 3.75 mg 3.75 mg 3.75 mg   Visit Report - - -   Some recent data might be hidden       Plan:  1. INR is therapeutic today- see above in Anticoagulation Summary.   Will instruct Harry Ventura to continue their warfarin regimen- see above in Anticoagulation Summary.  2. Follow up in 4 weeks.  3. Patient declines warfarin refills.  4. Verbal and written information provided. Patient expresses understanding and has no further questions at this time.    Priscilla Mccall

## 2019-05-06 ENCOUNTER — TELEPHONE (OUTPATIENT)
Dept: CARDIOLOGY | Facility: CLINIC | Age: 84
End: 2019-05-06

## 2019-05-06 ENCOUNTER — CLINICAL SUPPORT NO REQUIREMENTS (OUTPATIENT)
Dept: CARDIOLOGY | Facility: CLINIC | Age: 84
End: 2019-05-06

## 2019-05-06 DIAGNOSIS — I48.20 CHRONIC ATRIAL FIBRILLATION (HCC): Primary | ICD-10-CM

## 2019-05-06 PROCEDURE — 93279 PRGRMG DEV EVAL PM/LDLS PM: CPT | Performed by: INTERNAL MEDICINE

## 2019-05-06 NOTE — TELEPHONE ENCOUNTER
Patient was here today for an in office check of his pacemaker . Testing is normal. He did have 2 episodes that were labeled as vt. He only has rv lead and only markers were seen on egm . The episode on 3/30/19 was 17 beats long . It was at 08;13 rate of 189 bpm. The other was on 4/25/19 lasting 37 beats rate of 180 bpm at 13;10 . Patient does not recall any symptoms .

## 2019-05-07 NOTE — TELEPHONE ENCOUNTER
Patient appears to be chronically in atrial fibrillation.  This would make sudden afib with rvr or SVT unlikely.  Suspect NSVT.  No further action needed.

## 2019-05-08 ENCOUNTER — OFFICE VISIT (OUTPATIENT)
Dept: FAMILY MEDICINE CLINIC | Facility: CLINIC | Age: 84
End: 2019-05-08

## 2019-05-08 VITALS
SYSTOLIC BLOOD PRESSURE: 146 MMHG | DIASTOLIC BLOOD PRESSURE: 70 MMHG | RESPIRATION RATE: 16 BRPM | WEIGHT: 144.4 LBS | BODY MASS INDEX: 22.66 KG/M2 | HEART RATE: 59 BPM | TEMPERATURE: 97.3 F | OXYGEN SATURATION: 94 % | HEIGHT: 67 IN

## 2019-05-08 DIAGNOSIS — I50.9 CHRONIC CONGESTIVE HEART FAILURE, UNSPECIFIED HEART FAILURE TYPE (HCC): ICD-10-CM

## 2019-05-08 DIAGNOSIS — R23.8 SKIN IRRITATION: ICD-10-CM

## 2019-05-08 DIAGNOSIS — Z85.6 PERSONAL HISTORY OF CLL (CHRONIC LYMPHOCYTIC LEUKEMIA): ICD-10-CM

## 2019-05-08 DIAGNOSIS — I27.20 PULMONARY HYPERTENSION (HCC): Primary | ICD-10-CM

## 2019-05-08 PROBLEM — J81.1 PULMONARY EDEMA: Status: ACTIVE | Noted: 2018-11-18

## 2019-05-08 PROCEDURE — 99213 OFFICE O/P EST LOW 20 MIN: CPT | Performed by: FAMILY MEDICINE

## 2019-05-08 NOTE — TELEPHONE ENCOUNTER
Please find out what is his blood pressure and heart rate and is even having any chest pain/heaviness or shortness of breath with exertion. mariposa

## 2019-05-08 NOTE — PROGRESS NOTES
"HPI  Harry Ventura is a 88 y.o. male who is here for area between the folds of his buttocks.  Patient does wear depends because of Lasix for heart failure.  Multiple other medical issues noted as per past medical history.  Unable to find evidence of previous office visit here but is followed by multiple specialists.  Worked in today because of \"rash on his buttocks\".      Review of Systems   Constitutional: Positive for fatigue.   Skin: Positive for rash and wound.   Psychiatric/Behavioral: Positive for sleep disturbance.   All other systems reviewed and are negative.        Past Medical History:   Diagnosis Date   • Accelerated essential hypertension    • Anemia    • Atrial fibrillation (CMS/HCC)    • CAP (community acquired pneumonia)    • CHF (congestive heart failure) (CMS/HCC)    • Chronic anticoagulation    • Chronic diastolic CHF (congestive heart failure) (CMS/HCC)    • CLL (chronic lymphocytic leukemia) (CMS/HCC)     Stage 0   • COPD (chronic obstructive pulmonary disease) (CMS/HCC)    • Coronary artery disease    • ED (erectile dysfunction) of non-organic origin    • Gait instability    • GERD (gastroesophageal reflux disease)    • Glucose intolerance (malabsorption)    • Hematuria    • History of thrombocytopenia     Mild   • Hypercholesterolemia    • Hypertension     Pulmonary   • Hypogonadism male    • Insomnia    • Iron overload    • Low back pain    • Macrocytosis    • Pacemaker    • Personal history of CLL (chronic lymphocytic leukemia)    • Personal history of venous thrombosis and embolism    • Pleural effusion    • Renal insufficiency    • Seborrheic dermatitis    • SSS (sick sinus syndrome) (CMS/HCC)    • TIA (transient ischemic attack) 2017       Past Surgical History:   Procedure Laterality Date   • CARDIAC CATHETERIZATION N/A 6/17/2016    Procedure: Right and Left Heart Cath;  Surgeon: Harry Bolton MD;  Location: [facility] INVASIVE LOCATION;  Service:    • CARDIAC CATHETERIZATION N/A " 6/17/2016    Procedure: Coronary angiography;  Surgeon: Harry Bolton MD;  Location: [facility] CATH INVASIVE LOCATION;  Service:    • CARDIAC CATHETERIZATION N/A 6/17/2016    Procedure: Left Heart Cath;  Surgeon: Harry Bolton MD;  Location: [facility] CATH INVASIVE LOCATION;  Service:    • CARDIAC CATHETERIZATION N/A 6/17/2016    Procedure: Left ventriculography;  Surgeon: Harry Bolton MD;  Location: [facility] CATH INVASIVE LOCATION;  Service:    • CHOLECYSTECTOMY  1983   • PACEMAKER IMPLANTATION     • THORACOSCOPY Right 4/20/2018    Procedure: BRONCHOSCOPY, RIGHT VIDEO ASSISTED THORACOSCOPY, RIGHT LUNG DECORTICATION WITH PLEURAL BIOPSY, PLEURODESIS;  Surgeon: Erich Serrano III, MD;  Location: [facility] OR;  Service: Cardiothoracic   • UMBILICAL HERNIA REPAIR  2006    Performed laparoscopically by Dr. Garcia.       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   Tobacco Use   • Smoking status: Former Smoker     Packs/day: 0.00     Years: 20.00     Pack years: 0.00   • Smokeless tobacco: Never Used   • Tobacco comment: Quit smoking over 30 years ago. //  caffeine use   Substance and Sexual Activity   • Alcohol use: Yes     Alcohol/week: 1.8 oz     Types: 3 Shots of liquor per week     Comment: Three drinks a day wine or vodka, reporting 28 drinks a week.    • Drug use: No   • Sexual activity: Yes     Partners: Female   Social History Narrative    Stays fairly active  for his age, walking and golfing.          Physical Exam   Constitutional: He is oriented to person, place, and time. He appears well-developed and well-nourished. No distress.   HENT:   Head: Normocephalic.   Eyes: Conjunctivae are normal. Pupils are equal, round, and reactive to light.   Neck: Normal range of motion.   Cardiovascular: Normal rate.   Pulmonary/Chest: Effort normal. No respiratory distress.   Musculoskeletal: Normal range of motion. He exhibits no edema or deformity.   Neurological: He is alert and oriented to person, place, and time.   Skin: Skin is warm and dry.        Nursing note and vitals reviewed.        Assessment/Plan    Harry was seen today for rash, insomnia and fatigue.    Diagnoses and all orders for this visit:    Pulmonary hypertension (CMS/HCC)    Chronic congestive heart failure, unspecified heart failure type (CMS/HCC)    Personal history of CLL (chronic lymphocytic leukemia)    Skin irritation    Other orders  -     zinc oxide (DESITIN MAXIMUM STRENGTH) 40 % paste paste; Apply  topically to the appropriate area as directed 3 (Three) Times a Day As Needed (SKIN IRRITATION).        Patient presents with multiple medical illnesses some of which are noted above.  Has raw irritated area as discussed above.  Does wear depends most of the time but says he keeps him dry the majority of time.  Does not sit and moisture etc.  Has been using Vaseline without significant improvement.  No evidence of severe infection.  Will prescribe Desitin per protection.  If does not improve or worsens call for further evaluation.  Apparently was treated with some type of cream in the past but had become extremely expensive?    This note includes information entered using a voice recognition dictation system.  Though reviewed, some nonsensible errors may remain.

## 2019-05-09 NOTE — TELEPHONE ENCOUNTER
Spoke with pt.  BP is running 130s/60s with Pulse at 61 average.  No CP, no heaviness, he does have SOA but he has pulmonary htn.  He feels this is NOT getting worse with exertion.  He does NOT want to increase any meds as he feels more dizzy and tired when he was increased before.

## 2019-05-10 NOTE — TELEPHONE ENCOUNTER
Please verify that he is taking the metoprolol is 25 twice daily as I had recommended at the last visit.  In addition have him follow-up with me  Or Abiola in the next several weeks and may need to consider Lexiscan Cardiolite stress test.  Please let him know that this was a different rhythm than atrial fibrillation and possibly can lead to syncope and even be fatal if these episodes recur and last longer. mariposa

## 2019-05-10 NOTE — TELEPHONE ENCOUNTER
Spoke with pt.  He is taking Metoprolol 25mg BID.  Verbalized understanding on the information from Dr Gael Freedman- Can you please schedule him in the next 1-2 weeks.

## 2019-06-05 ENCOUNTER — ANTICOAGULATION VISIT (OUTPATIENT)
Dept: PHARMACY | Facility: HOSPITAL | Age: 84
End: 2019-06-05

## 2019-06-05 LAB
INR PPP: 2.7 (ref 0.91–1.09)
PROTHROMBIN TIME: 32.7 SECONDS (ref 10–13.8)

## 2019-06-05 PROCEDURE — 85610 PROTHROMBIN TIME: CPT

## 2019-06-05 PROCEDURE — 36416 COLLJ CAPILLARY BLOOD SPEC: CPT

## 2019-06-10 ENCOUNTER — ANTICOAGULATION VISIT (OUTPATIENT)
Dept: PHARMACY | Facility: HOSPITAL | Age: 84
End: 2019-06-10

## 2019-06-10 LAB
INR PPP: 1.4 (ref 0.91–1.09)
PROTHROMBIN TIME: 16.8 SECONDS (ref 10–13.8)

## 2019-06-10 PROCEDURE — 85610 PROTHROMBIN TIME: CPT

## 2019-06-10 PROCEDURE — 36416 COLLJ CAPILLARY BLOOD SPEC: CPT

## 2019-06-10 PROCEDURE — G0463 HOSPITAL OUTPT CLINIC VISIT: HCPCS

## 2019-06-10 NOTE — PROGRESS NOTES
Anticoagulation Clinic Progress Note    Anticoagulation Summary  As of 6/10/2019    INR goal:   2.0-3.0   TTR:   73.6 % (1.6 y)   INR used for dosin.4! (6/10/2019)   Warfarin maintenance plan:   5 mg every Mon, Fri; 3.75 mg all other days   Weekly warfarin total:   28.75 mg   Plan last modified:   Ady Anderson RP (2019)   Next INR check:   2019   Priority:   Maintenance   Target end date:   Indefinite    Indications    Atrial fibrillation (CMS/HCC) [I48.91]             Anticoagulation Episode Summary     INR check location:       Preferred lab:       Send INR reminders to:    [facility] CLINICAL POOL    Comments:         Anticoagulation Care Providers     Provider Role Specialty Phone number    Nidhi Juarez MD Referring Cardiology [phone]          Clinic Interview:      INR History:  Anticoagulation Monitoring 2019 2019 6/10/2019   INR 2.1 2.7 1.4   INR Date 2019 2019 6/10/2019   INR Goal 2.0-3.0 2.0-3.0 2.0-3.0   Trend Same Same Same   Last Week Total 28.75 mg 28.75 mg 21.25 mg   Next Week Total 28.75 mg 25 mg 30 mg   Sun 3.75 mg 3.75 mg 3.75 mg   Mon 5 mg - 5 mg   Tue 3.75 mg - 5 mg ()   Wed 3.75 mg 2.5 mg () 3.75 mg   Thu 3.75 mg 3.75 mg 3.75 mg   Fri 5 mg 2.5 mg () 5 mg   Sat 3.75 mg 3.75 mg 3.75 mg   Visit Report - - -   Some recent data might be hidden       Plan:  1. INR is Subtherapeutic today after reducing dose and patient held dose-- see above in Anticoagulation Summary.  Will instruct Harry Ventura to Continue their warfarin regimen after boosting with warfarin 5mg x 2 days- see above in Anticoagulation Summary.  2. Follow up in 1 week  3. Patient declines warfarin refills.  4. Verbal and written information provided. Patient expresses understanding and has no further questions at this time.    Nani Rao [facility]

## 2019-06-11 RX ORDER — FUROSEMIDE 40 MG/1
TABLET ORAL
Qty: 90 TABLET | Refills: 2 | Status: SHIPPED | OUTPATIENT
Start: 2019-06-11 | End: 2019-07-18 | Stop reason: SDUPTHER

## 2019-06-11 RX ORDER — WARFARIN SODIUM 2.5 MG/1
TABLET ORAL
Qty: 60 TABLET | Refills: 0 | Status: SHIPPED | OUTPATIENT
Start: 2019-06-11 | End: 2019-08-12 | Stop reason: SDUPTHER

## 2019-06-11 RX ORDER — OMEPRAZOLE 20 MG/1
CAPSULE, DELAYED RELEASE ORAL
Qty: 30 CAPSULE | Refills: 0 | Status: SHIPPED | OUTPATIENT
Start: 2019-06-11 | End: 2019-07-11 | Stop reason: SDUPTHER

## 2019-06-14 ENCOUNTER — ANTICOAGULATION VISIT (OUTPATIENT)
Dept: PHARMACY | Facility: HOSPITAL | Age: 84
End: 2019-06-14

## 2019-06-14 LAB
INR PPP: 1.1 (ref 0.91–1.09)
PROTHROMBIN TIME: 13.8 SECONDS (ref 10–13.8)

## 2019-06-14 PROCEDURE — 85610 PROTHROMBIN TIME: CPT

## 2019-06-14 PROCEDURE — G0463 HOSPITAL OUTPT CLINIC VISIT: HCPCS

## 2019-06-14 PROCEDURE — 36416 COLLJ CAPILLARY BLOOD SPEC: CPT

## 2019-06-14 NOTE — PROGRESS NOTES
Anticoagulation Clinic Progress Note    Anticoagulation Summary  As of 2019    INR goal:   2.0-3.0   TTR:   73.1 % (1.6 y)   INR used for dosin.1! (2019)   Warfarin maintenance plan:   5 mg every Mon, Fri; 3.75 mg all other days   Weekly warfarin total:   28.75 mg   Plan last modified:   Ady Anderson RP (2019)   Next INR check:   2019   Priority:   Maintenance   Target end date:   Indefinite    Indications    Atrial fibrillation (CMS/HCC) [I48.91]             Anticoagulation Episode Summary     INR check location:       Preferred lab:       Send INR reminders to:    [facility]IN[facility] PO[facility]    Comments:         Anticoagulation Care Providers     Provider Role Specialty Phone number    Nidhi Juarez MD Referring Cardiology [phone]          Clinic Interview:  Patient Findings     Negatives:   Signs/symptoms of thrombosis, Signs/symptoms of bleeding,   Laboratory test error suspected, Change in health, Change in alcohol use,   Change in activity, Upcoming invasive procedure, Emergency department   visit, Upcoming dental procedure, Missed doses, Extra doses, Change in   medications, Change in diet/appetite, Hospital admission, Bruising, Other   complaints        INR History:  Anticoagulation Monitoring 2019 6/10/2019 2019   INR 2.7 1.4 1.1   INR Date 2019 6/10/2019 2019   INR Goal 2.0-3.0 2.0-3.0 2.0-3.0   Trend Same Same Same   Last Week Total 28.75 mg 21.25 mg 10 mg   Next Week Total 25 mg 27.5 mg 30 mg   Sun 3.75 mg - 3.75 mg   Mon - 3.75 mg (6/10) 5 mg   Tue - 3.75 mg 3.75 mg   Wed 2.5 mg () 3.75 mg 3.75 mg   Thu 3.75 mg 3.75 mg 3.75 mg   Fri 2.5 mg () - 5 mg   Sat 3.75 mg - 5 mg (6/15); Otherwise 3.75 mg   Visit Report - - -   Some recent data might be hidden       Plan:  1. INR is Subtherapeutic today as patient held his warfarin for his dental procedure today despite our instructions for warfarin- see above in Anticoagulation Summary.  Will instruct  Harry Ventura to Continue their warfarin regimen- see above in Anticoagulation Summary.  He agrees to warfarin 5mg x 2 days after his procedure today,  then resume usual dosing.   2. Follow up in 1.5 weeks  3. Patient declines warfarin refills.  4. Verbal and written information provided. Patient expresses understanding and has no further questions at this time.    Nani Rao [facility]

## 2019-06-25 ENCOUNTER — ANTICOAGULATION VISIT (OUTPATIENT)
Dept: PHARMACY | Facility: HOSPITAL | Age: 84
End: 2019-06-25

## 2019-06-25 LAB
INR PPP: 1.6 (ref 0.91–1.09)
PROTHROMBIN TIME: 19 SECONDS (ref 10–13.8)

## 2019-06-25 PROCEDURE — 36416 COLLJ CAPILLARY BLOOD SPEC: CPT

## 2019-06-25 PROCEDURE — 85610 PROTHROMBIN TIME: CPT

## 2019-06-25 PROCEDURE — G0463 HOSPITAL OUTPT CLINIC VISIT: HCPCS

## 2019-06-25 NOTE — PROGRESS NOTES
Anticoagulation Clinic Progress Note    Anticoagulation Summary  As of 2019    INR goal:   2.0-3.0   TTR:   71.8 % (1.7 y)   INR used for dosin.6! (2019)   Warfarin maintenance plan:   5 mg every Mon, Fri; 3.75 mg all other days   Weekly warfarin total:   28.75 mg   Plan last modified:   Ady Anderson RP (2019)   Next INR check:   2019   Priority:   Maintenance   Target end date:   Indefinite    Indications    Atrial fibrillation (CMS/HCC) [I48.91]             Anticoagulation Episode Summary     INR check location:       Preferred lab:       [facility]    Comments:         Anticoagulation Care Providers     Provider Role Specialty Phone number    Nidhi Juarez MD Referring Cardiology [phone]          Clinic Interview:      INR History:  Anticoagulation Monitoring 6/10/2019 2019 2019   INR 1.4 1.1 1.6   INR Date 6/10/2019 2019 2019   INR Goal 2.0-3.0 2.0-3.0 2.0-3.0   Trend Same Same Same   Last Week Total 21.25 mg 10 mg 23.75 mg   Next Week Total 27.5 mg 30 mg 28.75 mg   Sun - 3.75 mg 3.75 mg   Mon 3.75 mg (6/10) 5 mg 5 mg   Tue 3.75 mg 3.75 mg 3.75 mg   Wed 3.75 mg 3.75 mg 3.75 mg   Thu 3.75 mg 3.75 mg 3.75 mg   Fri - 5 mg 5 mg   Sat - 5 mg (6/15); Otherwise 3.75 mg 3.75 mg   Visit Report - - -   Some recent data might be hidden       Plan:  1. INR is Subtherapeutic today- see above in Anticoagulation Summary.  Will instruct Harry Ventura to Continue their warfarin regimen- see above in Anticoagulation Summary.  2. Follow up in 2 weeks  3. Patient declines warfarin refills.  4. Verbal and written information provided. Patient expresses understanding and has no further questions at this time.    Nani Rao [facility]

## 2019-07-03 ENCOUNTER — APPOINTMENT (OUTPATIENT)
Dept: PHARMACY | Facility: HOSPITAL | Age: 84
End: 2019-07-03

## 2019-07-08 RX ORDER — POTASSIUM CHLORIDE 750 MG/1
TABLET, EXTENDED RELEASE ORAL
Qty: 180 TABLET | Refills: 0 | Status: SHIPPED | OUTPATIENT
Start: 2019-07-08 | End: 2019-07-18 | Stop reason: SDUPTHER

## 2019-07-09 ENCOUNTER — ANTICOAGULATION VISIT (OUTPATIENT)
Dept: PHARMACY | Facility: HOSPITAL | Age: 84
End: 2019-07-09

## 2019-07-09 LAB
INR PPP: 2.4 (ref 0.91–1.09)
PROTHROMBIN TIME: 28.5 SECONDS (ref 10–13.8)

## 2019-07-09 PROCEDURE — 85610 PROTHROMBIN TIME: CPT

## 2019-07-09 PROCEDURE — 36416 COLLJ CAPILLARY BLOOD SPEC: CPT

## 2019-07-09 NOTE — PROGRESS NOTES
Anticoagulation Clinic Progress Note    Anticoagulation Summary  As of 2019    INR goal:   2.0-3.0   TTR:   71.3 % (1.7 y)   INR used for dosin.4 (2019)   Warfarin maintenance plan:   5 mg every Mon, Fri; 3.75 mg all other days   Weekly warfarin total:   28.75 mg   No change documented:   Mady Javier   Plan last modified:   Ady Anderson RPH (2019)   Next INR check:   2019   Priority:   Maintenance   Target end date:   Indefinite    Indications    Atrial fibrillation (CMS/HCC) [I48.91]             Anticoagulation Episode Summary     INR check location:       Preferred lab:       Send INR reminders to:   [facility] CLINICAL POOL    Comments:         Anticoagulation Care Providers     Provider Role Specialty Phone number    Nidhi Juarez MD Referring Cardiology [phone]          Clinic Interview:  Patient Findings     Negatives:   Signs/symptoms of thrombosis, Signs/symptoms of bleeding,   Laboratory test error suspected, Change in health, Change in alcohol use,   Change in activity, Upcoming invasive procedure, Emergency department   visit, Upcoming dental procedure, Missed doses, Extra doses, Change in   medications, Change in diet/appetite, Hospital admission, Bruising, Other   complaints      Clinical Outcomes     Negatives:   Major bleeding event, Thromboembolic event,   Anticoagulation-related hospital admission, Anticoagulation-related ED   visit, Anticoagulation-related fatality        INR History:  Anticoagulation Monitoring 2019   INR 1.1 1.6 2.4   INR Date 2019   INR Goal 2.0-3.0 2.0-3.0 2.0-3.0   Trend Same Same Same   Last Week Total 10 mg 23.75 mg 28.75 mg   Next Week Total 30 mg 28.75 mg 28.75 mg   Sun 3.75 mg 3.75 mg 3.75 mg   Mon 5 mg 5 mg 5 mg   Tue 3.75 mg 3.75 mg 3.75 mg   Wed 3.75 mg 3.75 mg 3.75 mg   Thu 3.75 mg 3.75 mg 3.75 mg   Fri 5 mg 5 mg 5 mg   Sat 5 mg (6/15); Otherwise 3.75 mg 3.75 mg 3.75 mg   Visit  Report - - -   Some recent data might be hidden       Plan:  1. INR is therapeutic today- see above in Anticoagulation Summary.   Will instruct Harry Ventura to continue their warfarin regimen- see above in Anticoagulation Summary.  2. Follow up in 3 weeks.  3. Patient declines warfarin refills.  4. Verbal and written information provided. Patient expresses understanding and has no further questions at this time.    Mady Javier

## 2019-07-11 RX ORDER — OMEPRAZOLE 20 MG/1
CAPSULE, DELAYED RELEASE ORAL
Qty: 30 CAPSULE | Refills: 5 | Status: SHIPPED | OUTPATIENT
Start: 2019-07-11 | End: 2020-03-02

## 2019-07-11 RX ORDER — WARFARIN SODIUM 2.5 MG/1
TABLET ORAL
Qty: 60 TABLET | Refills: 0 | Status: SHIPPED | OUTPATIENT
Start: 2019-07-11 | End: 2019-08-10 | Stop reason: SDUPTHER

## 2019-07-16 ENCOUNTER — TELEPHONE (OUTPATIENT)
Dept: CARDIOLOGY | Facility: CLINIC | Age: 84
End: 2019-07-16

## 2019-07-16 DIAGNOSIS — Z51.81 THERAPEUTIC DRUG MONITORING: ICD-10-CM

## 2019-07-16 DIAGNOSIS — I50.9 CHRONIC CONGESTIVE HEART FAILURE, UNSPECIFIED HEART FAILURE TYPE (HCC): Primary | ICD-10-CM

## 2019-07-16 NOTE — TELEPHONE ENCOUNTER
I received this note from Dr Grace and no note of the pt to increase.  Pt said he has been taking the Lasix 40mg BID for 3 weeks.  Pt said he has NOT had any labs since this increase.  Please advise.  Pt is having NO swelling and NO SOA.

## 2019-07-16 NOTE — TELEPHONE ENCOUNTER
Pt called re: refill.  Pt said Dr Grace increased his Lasix 40mg BID and K+ 10 meq BID for a few days but wants us to fill and to continue at this dose.  Please advise.  I did request Dr Grace's note to see what is going on.

## 2019-07-17 ENCOUNTER — LAB (OUTPATIENT)
Dept: LAB | Facility: HOSPITAL | Age: 84
End: 2019-07-17

## 2019-07-17 DIAGNOSIS — Z51.81 THERAPEUTIC DRUG MONITORING: ICD-10-CM

## 2019-07-17 DIAGNOSIS — I50.9 CHRONIC CONGESTIVE HEART FAILURE, UNSPECIFIED HEART FAILURE TYPE (HCC): ICD-10-CM

## 2019-07-17 LAB
ANION GAP SERPL CALCULATED.3IONS-SCNC: 13.3 MMOL/L (ref 5–15)
BUN BLD-MCNC: 41 MG/DL (ref 8–23)
BUN/CREAT SERPL: 25.2 (ref 7–25)
CALCIUM SPEC-SCNC: 9.1 MG/DL (ref 8.6–10.5)
CHLORIDE SERPL-SCNC: 93 MMOL/L (ref 98–107)
CO2 SERPL-SCNC: 28.7 MMOL/L (ref 22–29)
CREAT BLD-MCNC: 1.63 MG/DL (ref 0.76–1.27)
GFR SERPL CREATININE-BSD FRML MDRD: 40 ML/MIN/1.73
GLUCOSE BLD-MCNC: 120 MG/DL (ref 65–99)
POTASSIUM BLD-SCNC: 3.6 MMOL/L (ref 3.5–5.2)
SODIUM BLD-SCNC: 135 MMOL/L (ref 136–145)

## 2019-07-17 PROCEDURE — 80048 BASIC METABOLIC PNL TOTAL CA: CPT

## 2019-07-17 PROCEDURE — 36415 COLL VENOUS BLD VENIPUNCTURE: CPT

## 2019-07-17 NOTE — TELEPHONE ENCOUNTER
Pt said Dr Grace told him to increase BUT the pt just continued.  I got the note and placed in your INBOX.  Pt will come in today around noon for labs.  Told him I fill meds once this is complete and kidneys are ok.  Ok with pt.

## 2019-07-17 NOTE — TELEPHONE ENCOUNTER
Not sure what this means.  Did the patient Dr. Grace increase the Lasix and potassium just for a few days or for 3 weeks?.  Please let me know when you get this note from Dr. Grace.  Go ahead and have him come in for a BMP tomorrow.. mariposa

## 2019-07-17 NOTE — TELEPHONE ENCOUNTER
Let him know kidney tests are worse and decrease Lasix to 40 mg a day and potassium chloride to 10 mEq a day.  Recheck BMP in 1 week. Thanks. mariposa

## 2019-07-18 RX ORDER — FUROSEMIDE 40 MG/1
40 TABLET ORAL EVERY MORNING
Qty: 90 TABLET | Refills: 0 | Status: SHIPPED | OUTPATIENT
Start: 2019-07-18 | End: 2019-10-08 | Stop reason: SDUPTHER

## 2019-07-18 RX ORDER — POTASSIUM CHLORIDE 750 MG/1
10 TABLET, EXTENDED RELEASE ORAL DAILY
Qty: 90 TABLET | Refills: 0 | Status: SHIPPED | OUTPATIENT
Start: 2019-07-18 | End: 2019-09-03

## 2019-07-18 RX ORDER — POTASSIUM CHLORIDE 750 MG/1
TABLET, EXTENDED RELEASE ORAL
Qty: 180 TABLET | Refills: 0 | OUTPATIENT
Start: 2019-07-18

## 2019-07-18 NOTE — TELEPHONE ENCOUNTER
Called [facility] back re: pt's furosemide Rx dosage and frequency. I clarified per telephone note that Dr. Juarez required pt only take 40 mg once daily, not BID. I also called pt and made sure he understood as well. Pharm. Rep and pt both verbalized understanding.   /Madonna

## 2019-07-26 ENCOUNTER — LAB (OUTPATIENT)
Dept: LAB | Facility: HOSPITAL | Age: 84
End: 2019-07-26

## 2019-07-26 DIAGNOSIS — Z51.81 THERAPEUTIC DRUG MONITORING: ICD-10-CM

## 2019-07-26 DIAGNOSIS — I50.9 CHRONIC CONGESTIVE HEART FAILURE, UNSPECIFIED HEART FAILURE TYPE (HCC): ICD-10-CM

## 2019-07-26 LAB
ANION GAP SERPL CALCULATED.3IONS-SCNC: 13.3 MMOL/L (ref 5–15)
BUN BLD-MCNC: 36 MG/DL (ref 8–23)
BUN/CREAT SERPL: 24.8 (ref 7–25)
CALCIUM SPEC-SCNC: 9.4 MG/DL (ref 8.6–10.5)
CHLORIDE SERPL-SCNC: 103 MMOL/L (ref 98–107)
CO2 SERPL-SCNC: 26.7 MMOL/L (ref 22–29)
CREAT BLD-MCNC: 1.45 MG/DL (ref 0.76–1.27)
GFR SERPL CREATININE-BSD FRML MDRD: 46 ML/MIN/1.73
GLUCOSE BLD-MCNC: 109 MG/DL (ref 65–99)
POTASSIUM BLD-SCNC: 4.4 MMOL/L (ref 3.5–5.2)
SODIUM BLD-SCNC: 143 MMOL/L (ref 136–145)

## 2019-07-26 PROCEDURE — 36415 COLL VENOUS BLD VENIPUNCTURE: CPT

## 2019-07-26 PROCEDURE — 80048 BASIC METABOLIC PNL TOTAL CA: CPT

## 2019-07-29 ENCOUNTER — ANTICOAGULATION VISIT (OUTPATIENT)
Dept: PHARMACY | Facility: HOSPITAL | Age: 84
End: 2019-07-29

## 2019-07-29 LAB
INR PPP: 2.6 (ref 0.91–1.09)
PROTHROMBIN TIME: 31.2 SECONDS (ref 10–13.8)

## 2019-07-29 PROCEDURE — 36416 COLLJ CAPILLARY BLOOD SPEC: CPT

## 2019-07-29 PROCEDURE — 85610 PROTHROMBIN TIME: CPT

## 2019-07-29 NOTE — TELEPHONE ENCOUNTER
Pt called for update on labs while taking Lasix 40mg QD.  Pt said he felt better taking 40mg BID.  No swelling and minimal SOA noticed in the AM.

## 2019-07-29 NOTE — TELEPHONE ENCOUNTER
Kidney labs are still abnormal.  If he wishes to take more Lasix will have to have him see his nephrologist or primary care provider to address further given underlying renal insufficiency.  Please see if he wishes to have a referral to nephrology.. aline

## 2019-07-29 NOTE — PROGRESS NOTES
Anticoagulation Clinic Progress Note    Anticoagulation Summary  As of 2019    INR goal:   2.0-3.0   TTR:   72.2 % (1.8 y)   INR used for dosin.6 (2019)   Warfarin maintenance plan:   5 mg every Mon, Fri; 3.75 mg all other days   Weekly warfarin total:   28.75 mg   No change documented:   Mady Javier   Plan last modified:   Ady Anderson RPH (2019)   Next INR check:   2019   Priority:   Maintenance   Target end date:   Indefinite    Indications    Atrial fibrillation (CMS/HCC) [I48.91]             Anticoagulation Episode Summary     INR check location:       Preferred lab:       Send INR reminders to:   [facility]    Comments:         Anticoagulation Care Providers     Provider Role Specialty Phone number    Nidhi Juarez MD Referring Cardiology [phone]          Clinic Interview:  Patient Findings     Negatives:   Signs/symptoms of thrombosis, Signs/symptoms of bleeding,   Laboratory test error suspected, Change in health, Change in alcohol use,   Change in activity, Upcoming invasive procedure, Emergency department   visit, Upcoming dental procedure, Missed doses, Extra doses, Change in   medications, Change in diet/appetite, Hospital admission, Bruising, Other   complaints      Clinical Outcomes     Negatives:   Major bleeding event, Thromboembolic event,   Anticoagulation-related hospital admission, Anticoagulation-related ED   visit, Anticoagulation-related fatality        INR History:  Anticoagulation Monitoring 2019   INR 1.6 2.4 2.6   INR Date 2019   INR Goal 2.0-3.0 2.0-3.0 2.0-3.0   Trend Same Same Same   Last Week Total 23.75 mg 28.75 mg 28.75 mg   Next Week Total 28.75 mg 28.75 mg 28.75 mg   Sun 3.75 mg 3.75 mg 3.75 mg   Mon 5 mg 5 mg 5 mg   Tue 3.75 mg 3.75 mg 3.75 mg   Wed 3.75 mg 3.75 mg 3.75 mg   Thu 3.75 mg 3.75 mg 3.75 mg   Fri 5 mg 5 mg 5 mg   Sat 3.75 mg 3.75 mg 3.75 mg   Visit Report - - -    Some recent data might be hidden       Plan:  1. INR is therapeutic today- see above in Anticoagulation Summary.   Will instruct Harry Ventura to continue their warfarin regimen- see above in Anticoagulation Summary.  2. Follow up in 4 weeks.  3. Patient declines warfarin refills.  4. Verbal and written information provided. Patient expresses understanding and has no further questions at this time.    Mady Javier

## 2019-07-30 ENCOUNTER — APPOINTMENT (OUTPATIENT)
Dept: PHARMACY | Facility: HOSPITAL | Age: 84
End: 2019-07-30

## 2019-07-30 NOTE — TELEPHONE ENCOUNTER
FYI: Spoke with pt.  He saw Dr Cantu is Feb 2019 and was supposed to followup in March 2019.  Pt failed to keep appt per Dr Cantu's office.  Advised them of his abnormal kidney function and lasix use.  Also gave pt info re: need appt to further address with Dr Cantu.  Pt will call today and get back in.  I also requested last OV note to be faxed to us for our records.

## 2019-08-12 RX ORDER — WARFARIN SODIUM 2.5 MG/1
TABLET ORAL
Qty: 60 TABLET | Refills: 0 | Status: SHIPPED | OUTPATIENT
Start: 2019-08-12 | End: 2019-09-11 | Stop reason: SDUPTHER

## 2019-08-13 ENCOUNTER — CLINICAL SUPPORT NO REQUIREMENTS (OUTPATIENT)
Dept: CARDIOLOGY | Facility: CLINIC | Age: 84
End: 2019-08-13

## 2019-08-13 ENCOUNTER — TELEPHONE (OUTPATIENT)
Dept: CARDIOLOGY | Facility: CLINIC | Age: 84
End: 2019-08-13

## 2019-08-13 DIAGNOSIS — I48.20 CHRONIC ATRIAL FIBRILLATION (HCC): Primary | ICD-10-CM

## 2019-08-13 PROCEDURE — 93288 INTERROG EVL PM/LDLS PM IP: CPT | Performed by: INTERNAL MEDICINE

## 2019-08-13 NOTE — TELEPHONE ENCOUNTER
Please let patient know that he is still having skipped beats.  Find out what is his blood pressure?  Have him come in to see me or aris.  He did not keep that appointment we had given him last time.  He really needs to be seen.

## 2019-08-13 NOTE — TELEPHONE ENCOUNTER
Patient seen in clinic for VVI pacemaker evaluation. There were 3 NST episodes reported with 2 egms for review. One is 25 beats @ 160's. The other is 18 beats @ 180's. Episodes appear similar to the ones reviewed by Dr. Bradford in 5/6/19 task.

## 2019-08-19 NOTE — TELEPHONE ENCOUNTER
Spoke with pt.  BP is running 130/70.  He has an appt 9/26 with an in office pacemaker check then.  Is this ok to keep?

## 2019-08-20 NOTE — TELEPHONE ENCOUNTER
Increase metoprolol tartrate to 37.5 mg p.o. twice daily and have him come in to see me, bAiola or other APRN in the next 2 weeks. mariposa

## 2019-08-20 NOTE — TELEPHONE ENCOUNTER
Spoke with pt.  He will increase the Metoprolol.  Rx updated at the pharmacy in case her needs.    Tano- Please call to schedule with APRN.

## 2019-09-03 ENCOUNTER — OFFICE VISIT (OUTPATIENT)
Dept: CARDIOLOGY | Facility: CLINIC | Age: 84
End: 2019-09-03

## 2019-09-03 ENCOUNTER — ANTICOAGULATION VISIT (OUTPATIENT)
Dept: PHARMACY | Facility: HOSPITAL | Age: 84
End: 2019-09-03

## 2019-09-03 VITALS
SYSTOLIC BLOOD PRESSURE: 118 MMHG | HEIGHT: 67 IN | WEIGHT: 143.2 LBS | BODY MASS INDEX: 22.47 KG/M2 | OXYGEN SATURATION: 95 % | DIASTOLIC BLOOD PRESSURE: 54 MMHG | HEART RATE: 61 BPM

## 2019-09-03 DIAGNOSIS — Z86.718 PERSONAL HISTORY OF VENOUS THROMBOSIS AND EMBOLISM: ICD-10-CM

## 2019-09-03 DIAGNOSIS — J90 BILATERAL PLEURAL EFFUSION: ICD-10-CM

## 2019-09-03 DIAGNOSIS — Z79.01 WARFARIN ANTICOAGULATION: ICD-10-CM

## 2019-09-03 DIAGNOSIS — Z95.0 PACEMAKER: ICD-10-CM

## 2019-09-03 DIAGNOSIS — I27.20 PULMONARY HYPERTENSION (HCC): ICD-10-CM

## 2019-09-03 DIAGNOSIS — I47.29 NONSUSTAINED VENTRICULAR TACHYCARDIA (HCC): Primary | ICD-10-CM

## 2019-09-03 DIAGNOSIS — J44.9 CHRONIC OBSTRUCTIVE PULMONARY DISEASE, UNSPECIFIED COPD TYPE (HCC): ICD-10-CM

## 2019-09-03 DIAGNOSIS — E78.00 PURE HYPERCHOLESTEROLEMIA: ICD-10-CM

## 2019-09-03 DIAGNOSIS — Z85.6 PERSONAL HISTORY OF CLL (CHRONIC LYMPHOCYTIC LEUKEMIA): ICD-10-CM

## 2019-09-03 DIAGNOSIS — I50.9 CHRONIC CONGESTIVE HEART FAILURE, UNSPECIFIED HEART FAILURE TYPE (HCC): ICD-10-CM

## 2019-09-03 DIAGNOSIS — I48.91 ATRIAL FIBRILLATION, UNSPECIFIED TYPE (HCC): ICD-10-CM

## 2019-09-03 DIAGNOSIS — I10 ESSENTIAL HYPERTENSION: ICD-10-CM

## 2019-09-03 DIAGNOSIS — G45.9 TIA (TRANSIENT ISCHEMIC ATTACK): ICD-10-CM

## 2019-09-03 DIAGNOSIS — I25.10 CORONARY ARTERY DISEASE INVOLVING NATIVE CORONARY ARTERY OF NATIVE HEART WITHOUT ANGINA PECTORIS: ICD-10-CM

## 2019-09-03 LAB
INR PPP: 2.8 (ref 0.91–1.09)
PROTHROMBIN TIME: 33.7 SECONDS (ref 10–13.8)

## 2019-09-03 PROCEDURE — 93000 ELECTROCARDIOGRAM COMPLETE: CPT | Performed by: NURSE PRACTITIONER

## 2019-09-03 PROCEDURE — 99215 OFFICE O/P EST HI 40 MIN: CPT | Performed by: NURSE PRACTITIONER

## 2019-09-03 PROCEDURE — 85610 PROTHROMBIN TIME: CPT

## 2019-09-03 PROCEDURE — 36416 COLLJ CAPILLARY BLOOD SPEC: CPT

## 2019-09-03 RX ORDER — GUAIFENESIN 600 MG/1
1200 TABLET, EXTENDED RELEASE ORAL 2 TIMES DAILY
COMMUNITY

## 2019-09-03 NOTE — PROGRESS NOTES
Date of Office Visit: 2019  Encounter Provider: NARINDER Thornton  Place of Service: [facility] CARDIOLOGY  Patient Name: Harry Ventura  :1930      Subjective:     Chief Complaint:  2 week follow up for Nonsustained Tachycardia    History of Present Illness:  Harry Ventura is a pleasant 88 y.o. male who is new to me.  Outside records have been obtained and reviewed by me.     This is a patient of Dr. Juarez with a history of coronary artery disease, diastolic heart failure, hypertension, atrial fibrillation with sick sinus syndrome and pacemaker placement, hyperlipidemia, pulmonary hypertension, valvular heart disease, DVT, CLL, TIA.     he was admitted with medical noncompliance and dietary noncompliance and heart failure.  He was noted to have pulmonary hypertension.    2014 pulmonary hypertension had progressed with RVSP of 67 mmHg in the setting of diastolic dysfunction.    2015 he developed bradycardia and had pacemaker placed.    2016 and normal LV size and systolic function with mild aortic regurgitation, mild to moderate tricuspid regurgitation with RVSP of 68 mmHg.  He had a right heart catheterization revealing significant diastolic dysfunction elevated biventricular pressures with mild pulmonary hypertension and mild coronary artery disease.  Diuretics were added.     2017 patient explains facial droop and a TIA.  His INR was subtherapeutic at that time he was given Lovenox until his INR became therapeutic.  Low-dose aspirin and statin therapy were also added and there was discussion about changing anticoagulation to Eliquis but patient did not wish to do so due to the cost of the drug.  Had an echocardiogram revealed normal systolic function with a EF of 62%, mild mitral regurgitation, trivial to mild aortic regurgitation no evidence of interatrial shunting with saline injection and RVSP 49 mmHg.    2018 patient was admitted  to [facility] for probable aspiration pneumonia and treated with antibiotics.  He an echo at that time that showed normal systolic function with right ventricular enlargement with moderate right ventricle systolic dysfunction, mild to moderate aortic valve stenosis, mild aortic regurgitation and RVSP of 51 mmHg.  Aortic root was felt to be mildly dilated at 3.6 cm. It appears he had a thoracentesis for pleural effusion at this time.     1/27/2019 patient was in the office to see Dr. Juarez.  He had been back to his baseline since discharge from hospital.  He had mild dependent edema and mild dyspnea on exertion that was back to his baseline.  He had no chest pain, palpitations, lightheadedness or syncope.Dr. Juarez reported he had a chronically elevated BNP and had some renal insufficiency so she wanted him to see nephrology before uptitrating his diuretics. 2/4/2019 patient had a chest x-ray that showed small to moderate bilateral pleural effusions, right lower lobe infiltrate that appeared mostly chronic and was similar to June 2018.  Dr. Juarez discussed his chest xray findings with the patient's pulmonologist Dr. Grace.    In May 2019 device check patient had some asymptomatic nonsustained ventricular tachycardia episodes that were reviewed with Dr. Bradford.  Dr. Juarez wanted to make sure he was taking his metoprolol tartrate 25 mg twice a day to follow-up in the next several weeks to possibly consider nuclear stress test. It looks like he was never scheduled.    July 2019 apparently his diuretic dose was doubled by Dr. Grace.  Dr. Juarez had him come in for lab work and his kidney function worsens history of him back down to 40 mg of Lasix a day with plan for recheck labs in 1 week.  His BUN and creatinine improved some on 7/26/19 to 36 and 1.45 with a EGFR of 46. Last INR was therapeutic at 2.6.     8/13/19 patient had pacemaker check and had 3 nonsustained episodes one was 25 beats at 160 bpm that was 18 minutes 180s.  His  metoprolol tartrate was increased to 37.5 mg twice daily and Dr. Juarez wanted him seen in 2 weeks.    He is presenting today for 2-week follow-up visit since increasing his Metoprolol tartrate dose.  He is present today with his wife and at today's visit he has been doing about the same.  He denies any chest pain, palpitations, racing of heart rate, skipped beats or irregularity, dizziness, lightness, syncope, near syncope.  His lower extremity edema is overall better than it had been in the past.  He does have easy fatigability that he does not feel is worse since increasing his metoprolol dose.  His chronic cough and dyspnea are at baseline.  He sees Dr. Grace for follow-up today.  He reports his blood pressure is usually in the 130s systolic.  He has not checked it recently since increasing his metoprolol dose but his blood pressure stable today at 118/54.  He has no reports of wheezing.  He uses oxygen at nighttime and occasionally at home.  He is taking Lasix 40 mg daily.  He reports that he just got a letter in the mail to follow back up with nephrology Dr. Cantu appears that he may have been confused and got lost in follow-up.  Patient denies any falls or abnormal bleeding on warfarin.  He did miss his August 2019 INR check and is past due for an INR check.  His wife will have this done today.  His wife is requesting to see if they have 12.5 mg tablets as she has difficulty cutting the metoprolol in half with a knife at home.    Past Medical History:   Diagnosis Date   • Accelerated essential hypertension    • Anemia    • Atrial fibrillation (CMS/HCC)    • CAP (community acquired pneumonia)    • CHF (congestive heart failure) (CMS/HCC)    • Chronic anticoagulation    • Chronic diastolic CHF (congestive heart failure) (CMS/HCC)    • CLL (chronic lymphocytic leukemia) (CMS/HCC)     Stage 0   • COPD (chronic obstructive pulmonary disease) (CMS/HCC)    • Coronary artery disease    • ED (erectile dysfunction) of  non-organic origin    • Gait instability    • GERD (gastroesophageal reflux disease)    • Glucose intolerance (malabsorption)    • Hematuria    • History of thrombocytopenia     Mild   • Hypercholesterolemia    • Hypertension     Pulmonary   • Hypogonadism male    • Insomnia    • Iron overload    • Low back pain    • Macrocytosis    • Pacemaker    • Personal history of CLL (chronic lymphocytic leukemia)    • Personal history of venous thrombosis and embolism    • Pleural effusion    • Pulmonary hypertension (CMS/HCC)    • Renal insufficiency    • Seborrheic dermatitis    • Shortness of breath    • SSS (sick sinus syndrome) (CMS/HCC)    • TIA (transient ischemic attack) 2017     Past Surgical History:   Procedure Laterality Date   • CARDIAC CATHETERIZATION N/A 6/17/2016    Procedure: Right and Left Heart Cath;  Surgeon: Harry Bolton MD;  Location: [facility] CATH INVASIVE LOCATION;  Service:    • CARDIAC CATHETERIZATION N/A 6/17/2016    Procedure: Coronary angiography;  Surgeon: Harry Bolton MD;  Location: [facility] CATH INVASIVE LOCATION;  Service:    • CARDIAC CATHETERIZATION N/A 6/17/2016    Procedure: Left Heart Cath;  Surgeon: Harry Bolton MD;  Location: [facility] CATH INVASIVE LOCATION;  Service:    • CARDIAC CATHETERIZATION N/A 6/17/2016    Procedure: Left ventriculography;  Surgeon: Harry Bolton MD;  Location: [facility] CATH INVASIVE LOCATION;  Service:    • CHOLECYSTECTOMY  1983   • PACEMAKER IMPLANTATION     • THORACOSCOPY Right 4/20/2018    Procedure: BRONCHOSCOPY, RIGHT VIDEO ASSISTED THORACOSCOPY, RIGHT LUNG DECORTICATION WITH PLEURAL BIOPSY, PLEURODESIS;  Surgeon: Erich Serrano III, MD;  Location: [facility]  Service: Cardiothoracic   • UMBILICAL HERNIA REPAIR  2006    Performed laparoscopically by Dr. Garcia.     Outpatient Medications Prior to Visit   Medication Sig Dispense Refill   • atorvastatin (LIPITOR) 40 MG tablet Take 1 tablet by mouth Daily. 90 tablet 3   • fluticasone (FLONASE) 50 MCG/ACT nasal spray 2  sprays into the nostril(s) as directed by provider As Needed.     • furosemide (LASIX) 40 MG tablet Take 1 tablet by mouth Every Morning. 90 tablet 0   • guaiFENesin (MUCINEX) 600 MG 12 hr tablet Take 1,200 mg by mouth 2 (Two) Times a Day.     • metoprolol tartrate (LOPRESSOR) 25 MG tablet Take 1.5 tablets by mouth 2 (Two) Times a Day. 90 tablet 1   • O2 (OXYGEN) Inhale 2 L/min 1 (One) Time. As needed     • omeprazole (priLOSEC) 20 MG capsule TAKE 1 CAPSULE BY MOUTH IN THE MORNING before breakfast   30 capsule 5   • potassium chloride (K-DUR,KLOR-CON) 10 MEQ CR tablet TAKE 1 TABLET BY MOUTH TWO TIMES A DAY  (Patient taking differently: TAKE 1 TABLET BY MOUTH TWO TIMES A DAY  - currently taking one tablet daily) 180 tablet 0   • warfarin (JANTOVEN) 2.5 MG tablet take 1 and 1/2 tablets by mouth for 5 days a week and take 2 tabs for the other 2 days 60 tablet 0   • zinc oxide (DESITIN MAXIMUM STRENGTH) 40 % paste paste Apply  topically to the appropriate area as directed 3 (Three) Times a Day As Needed (SKIN IRRITATION). 454 g 2   • aspirin 81 MG EC tablet Take 81 mg by mouth Daily.     • potassium chloride (K-DUR,KLOR-CON) 10 MEQ CR tablet Take 1 tablet by mouth Daily. 90 tablet 0     No facility-administered medications prior to visit.        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   Tobacco Use   • Smoking status: Former Smoker     Packs/day: 0.00     Years: 20.00     Pack years: 0.00   • Smokeless tobacco: Never Used   • Tobacco comment: Quit smoking over 30 years ago. //  caffeine use   Substance and Sexual Activity   • Alcohol use: Yes     Alcohol/week: 1.8 oz     Types: 3 Shots of liquor per week      Comment: Three drinks a day wine or vodka, reporting 28 drinks a week.    • Drug use: No   • Sexual activity: Yes     Partners: Female   Social History Narrative    Stays fairly active for his age, walking and golfing.      Family History   Problem Relation Age of Onset   • Emphysema Father    • Emphysema Sister    • Asthma Sister    • Coronary artery disease Other    • No Known Problems Mother    • Liver cancer Paternal Uncle 55   • Liver cancer Paternal Uncle 74   • Liver cancer Paternal Uncle 79   • Malig Hyperthermia Neg Hx      Review of Systems   Constitution: Positive for decreased appetite and malaise/fatigue. Negative for chills and fever.   HENT: Negative for ear pain, hearing loss, nosebleeds and sore throat.    Eyes: Negative for double vision, pain, vision loss in left eye and vision loss in right eye.   Cardiovascular: Positive for dyspnea on exertion. Negative for chest pain, claudication, irregular heartbeat, leg swelling, near-syncope, orthopnea, palpitations, paroxysmal nocturnal dyspnea and syncope.   Respiratory: Positive for cough (chronic, unchanged) and shortness of breath (chronic, unchanged). Negative for snoring and wheezing.    Endocrine: Positive for cold intolerance. Negative for heat intolerance.   Hematologic/Lymphatic: Negative for bleeding problem.   Skin: Negative for color change, itching, rash and unusual hair distribution.   Musculoskeletal: Negative for joint pain and joint swelling.   Gastrointestinal: Negative for abdominal pain, diarrhea, hematochezia, melena, nausea and vomiting.   Genitourinary: Negative for decreased libido, frequency, hematuria, hesitancy and incomplete emptying.   Neurological: Positive for excessive daytime sleepiness. Negative for dizziness, headaches, light-headedness, loss of balance, numbness, paresthesias and seizures.   Psychiatric/Behavioral: Negative for depression.          Objective:     Vitals:    09/03/19 1213 09/03/19 1246   BP: 118/54   "  BP Location: Right arm    Patient Position: Sitting    Cuff Size: Adult    Pulse: 60 61   SpO2:  95%   Weight: 65 kg (143 lb 3.2 oz)    Height: 170.2 cm (67\")      Body mass index is 22.43 kg/m².    PHYSICAL EXAM:  Physical Exam   Constitutional: He is oriented to person, place, and time. He appears well-developed and well-nourished. No distress.   Frail   HENT:   Head: Normocephalic and atraumatic.   Eyes: Pupils are equal, round, and reactive to light.   Neck: No JVD present. Carotid bruit is not present.   Cardiovascular: Normal rate, regular rhythm and intact distal pulses.   Murmur heard.   Harsh midsystolic murmur is present with a grade of 2/6 at the upper right sternal border radiating to the neck.  Pulses:       Carotid pulses are 2+ on the right side, and 2+ on the left side.       Radial pulses are 2+ on the right side, and 2+ on the left side.        Posterior tibial pulses are 2+ on the right side, and 2+ on the left side.   Minimal dependent edema bilateral lower extremities   Pulmonary/Chest: Effort normal and breath sounds normal. No accessory muscle usage. No respiratory distress. He has no decreased breath sounds. He has no wheezes. He has no rhonchi. He has no rales. He exhibits no tenderness.   Coarse breath sounds, no rales, chronic dry cough    Left chest PPM   Abdominal: Soft. Bowel sounds are normal. There is no tenderness. There is no rebound and no guarding.   Rounded but compressible   Musculoskeletal: He exhibits no edema.   Uses cane   Neurological: He is alert and oriented to person, place, and time.   Skin: Skin is warm, dry and intact. He is not diaphoretic. No erythema.   Psychiatric: He has a normal mood and affect. His speech is normal and behavior is normal. Judgment and thought content normal. Cognition and memory are normal.   Nursing note and vitals reviewed.        ECG 12 Lead  Date/Time: 9/3/2019 12:12 PM  Performed by: Eleni Erazo APRN  Authorized by: Eleni Erazo, " APRN   Comparison: compared with previous ECG from 1/25/2019  Rhythm: atrial fibrillation and paced  Rate: normal  BPM: 60  Other findings: non-specific ST-T wave changes  Pacing: ventricular paced rhythm (intermettent ventricular pacing)  Clinical impression: abnormal EKG  Comments: Indication: Atrial fibrillation, nonsustained ventricular tachycardia,             Assessment:       Diagnosis Plan   1. Nonsustained ventricular tachycardia (CMS/HCC)  ECG 12 Lead    Stress Test With Myocardial Perfusion One Day   2. Pacemaker  ECG 12 Lead    Stress Test With Myocardial Perfusion One Day   3. Pulmonary hypertension (CMS/HCC)  ECG 12 Lead    Stress Test With Myocardial Perfusion One Day   4. Chronic congestive heart failure, unspecified heart failure type (CMS/HCC)  ECG 12 Lead    Stress Test With Myocardial Perfusion One Day   5. Atrial fibrillation, unspecified type (CMS/HCC)  ECG 12 Lead    Stress Test With Myocardial Perfusion One Day   6. Coronary artery disease involving native coronary artery of native heart without angina pectoris  ECG 12 Lead    Stress Test With Myocardial Perfusion One Day   7. Essential hypertension  ECG 12 Lead    Stress Test With Myocardial Perfusion One Day   8. TIA (transient ischemic attack)  ECG 12 Lead    Stress Test With Myocardial Perfusion One Day   9. Chronic obstructive pulmonary disease, unspecified COPD type (CMS/HCC)  ECG 12 Lead    Stress Test With Myocardial Perfusion One Day   10. Pure hypercholesterolemia  ECG 12 Lead    Stress Test With Myocardial Perfusion One Day   11. Personal history of CLL (chronic lymphocytic leukemia)  ECG 12 Lead    Stress Test With Myocardial Perfusion One Day   12. Personal history of venous thrombosis and embolism  ECG 12 Lead    Stress Test With Myocardial Perfusion One Day   13. Warfarin anticoagulation  ECG 12 Lead    Stress Test With Myocardial Perfusion One Day   14. Bilateral pleural effusion  ECG 12 Lead    Stress Test With Myocardial  Perfusion One Day       Plan:     1.   Nonsustained Ventricular tachycardia: A few episodes on pacemaker device check 8/13/19, Metoprolol tartrate recently increased to 37.5 mg BID. Will check nuclear stress test and continue to monitor for recurrence.  We will check with patient's pharmacy to see if they carry a 12.5 mg metoprolol tartrate tablet I doubt.  I did instruct her to buy a pill splitter for home at the pharmacy for ease of cutting his metoprolol tartrate pill in half and she demonstrated to  a pill splitter.  2. Pulmonary hypertension:  Mild on June 2016 cath, severely elevated RVSPs on echo in the past.  RVSP of 51 mmHg with some RV dysfunction on most recent echo 11/2018 at [place]. Follows with pulmonology Dr. Grace  3.  Hypertension:  Controlled at this time.  I gave the patient his wife a log and advised to monitor his blood pressure at home with increasing metoprolol tartrate dose.  They are to notify me if he has systolic blood pressure readings less than 100.  They demonstrated understanding.  4.  Persistent atrial fibrillation, rates under reasonable control:  Continue with warfarin and metoprolol.   5.  Mild coronary artery disease: On aspirin, statin.  Mild disease on June 2016 cardiac catheterization. He has no anginal symptoms.  6.  Sick sinus syndrome: s/p pacemaker placement.   Routine device checks. Device has been functioning appropriately and August 2019 check had over 6 years of estimated battery life.    7.  Chronic cough and dyspnea: Again unchanged. Follows with pulmonology Dr. Grace.  States he uses Mucinex BID per Dr. Grace with some improvement.   8. Chronic diastolic heart failure: Diastolic function indeterminate on [facility] echo 11/2018.  No new or worsening symptoms.  9. Valvular disease: Most recent echo 11/2018 at [place] showed mild AI with mild to moderate AS, mild tricuspid regurgitation, heavy posterior MAC with no mitral stenosis/mitral regurgitation.  Plan for timing  of repeat echo to be discussed with Dr. Juarez at his upcoming September 2019 follow-up appointment.  10. Bilateral pleural effusions: Has required thoracentesis in the past. Follows with pulmonology Dr. Grace and has appointment with him today.  11. Renal insufficiency: Now following with nephrology.    12. Chronic DVT: On Warfarin   13. Hx of TIA: On aspirin, statin, warfarin. No new reported symptoms.   14.  Dilation of aortic root: Mild at 3.6 cm on [facility] echo 11/2018  15. Hyperlipidemia: On statin managed by PCP.   16. Warfarin anticoagulation: Due for INR check.  He missed his August 2019 INR check.  I advised the patient is wife to go down to the anticoagulation where his INR is are monitored and have this done today.  They both demonstrated understanding.  We reviewed bleeding and safety precautions on his warfarin.  He was educated if he has a fall and hits his head that he will need to be evaluated in the emergency department.  The patient and his wife both demonstrated understanding.    Atrial Fibrillation and Atrial Flutter  Assessment  • The patient has persistent atrial fibrillation  • This is non-valvular in etiology  • The patient's CHADS2-VASc score is 6  • A [id number] score of 2 or more is considered a high risk for a thromboembolic event  • Warfarin prescribed    Plan  • Continue in atrial fibrillation with rate control  • Continue warfarin for antithrombotic therapy, bleeding issues discussed  • Continue beta blocker for rate control         I spent over 40 minutes of face-to-face time with the patient in which most of the time was spent discussing medications, answering questions, reviewing plan of care and testing that is being ordered. I have reviewed this case with Dr. Juarez.  She wants to hold off on ordering repeat device check at upcoming 9/26/2019 appointment.  She would also like to determine at that point when she would like repeat echocardiogram.    Follow up with Dr. Jaurez on 9/26/19,  unless otherwise needed sooner.  I advised the patient to contact our office with any questions or concerns.      It has been a pleasure to participate in this patient's care. Please feel free to contact me with any questions or concerns.     Eleni Erazo, NARINDER  09/03/2019            Your medication list           Accurate as of 9/3/19  5:04 PM. If you have any questions, ask your nurse or doctor.               CHANGE how you take these medications      Instructions Last Dose Given Next Dose Due   potassium chloride 10 MEQ CR tablet  Commonly known as:  K-DURMIRIAMOR-GRAZYNA  What changed:    · how much to take  · how to take this  · when to take this  · Another medication with the same name was removed. Continue taking this medication, and follow the directions you see here.      TAKE 1 TABLET BY MOUTH TWO TIMES A DAY          CONTINUE taking these medications      Instructions Last Dose Given Next Dose Due   atorvastatin 40 MG tablet  Commonly known as:  LIPITOR      Take 1 tablet by mouth Daily.       fluticasone 50 MCG/ACT nasal spray  Commonly known as:  FLONASE      2 sprays into the nostril(s) as directed by provider As Needed.       furosemide 40 MG tablet  Commonly known as:  LASIX      Take 1 tablet by mouth Every Morning.       guaiFENesin 600 MG 12 hr tablet  Commonly known as:  MUCINEX      Take 1,200 mg by mouth 2 (Two) Times a Day.       metoprolol tartrate 25 MG tablet  Commonly known as:  LOPRESSOR      Take 1.5 tablets by mouth 2 (Two) Times a Day.       O2  Commonly known as:  OXYGEN      Inhale 2 L/min 1 (One) Time. As needed       omeprazole 20 MG capsule  Commonly known as:  priLOSEC      TAKE 1 CAPSULE BY MOUTH IN THE MORNING before breakfast       warfarin 2.5 MG tablet  Commonly known as:  JANTOVEN      take 1 and 1/2 tablets by mouth for 5 days a week and take 2 tabs for the other 2 days       zinc oxide 40 % paste paste  Commonly known as:  DESITIN MAXIMUM STRENGTH      Apply  topically to the  appropriate area as directed 3 (Three) Times a Day As Needed (SKIN IRRITATION).          STOP taking these medications    aspirin 81 MG EC tablet  Stopped by:  NARINDER Thornton               The above medication changes may not have been made by this provider.  Medication list was updated to reflect medications patient is currently taking including medication changes and discontinuations made by other healthcare providers.     Dictated utilizing Dragon Dictation System.

## 2019-09-03 NOTE — PROGRESS NOTES
Anticoagulation Clinic Progress Note    Anticoagulation Summary  As of 9/3/2019    INR goal:   2.0-3.0   TTR:   73.7 % (1.9 y)   INR used for dosin.8 (9/3/2019)   Warfarin maintenance plan:   5 mg every Mon, Fri; 3.75 mg all other days   Weekly warfarin total:   28.75 mg   No change documented:   Mady Javier   Plan last modified:   Ady Anderson [facility] (2019)   Next INR check:   10/1/2019   Priority:   Maintenance   Target end date:   Indefinite    Indications    Atrial fibrillation (CMS/HCC) [I48.91]             Anticoagulation Episode Summary     INR check location:       Preferred lab:       Send INR reminders to:    [facility]    Comments:         Anticoagulation Care Providers     Provider Role Specialty Phone number    Nidhi Juarez MD Referring Cardiology [phone]          Clinic Interview:      INR History:  Anticoagulation Monitoring 2019 2019 9/3/2019   INR 2.4 2.6 2.8   INR Date 2019 2019 9/3/2019   INR Goal 2.0-3.0 2.0-3.0 2.0-3.0   Trend Same Same Same   Last Week Total 28.75 mg 28.75 mg 28.75 mg   Next Week Total 28.75 mg 28.75 mg 28.75 mg   Sun 3.75 mg 3.75 mg 3.75 mg   Mon 5 mg 5 mg 5 mg   Tue 3.75 mg 3.75 mg 3.75 mg   Wed 3.75 mg 3.75 mg 3.75 mg   Thu 3.75 mg 3.75 mg 3.75 mg   Fri 5 mg 5 mg 5 mg   Sat 3.75 mg 3.75 mg 3.75 mg   Visit Report - - -   Some recent data might be hidden       Plan:  1. INR is therapeutic today- see above in Anticoagulation Summary.   Will instruct Harry Ventura to continue their warfarin regimen- see above in Anticoagulation Summary.  2. Follow up in 4 weeks.  3. Patient declines warfarin refills.  4. Verbal and written information provided. Patient expresses understanding and has no further questions at this time.    Mady Javier

## 2019-09-11 RX ORDER — WARFARIN SODIUM 2.5 MG/1
TABLET ORAL
Qty: 60 TABLET | Refills: 0 | Status: SHIPPED | OUTPATIENT
Start: 2019-09-11 | End: 2019-12-13 | Stop reason: SDUPTHER

## 2019-09-12 ENCOUNTER — HOSPITAL ENCOUNTER (OUTPATIENT)
Dept: CARDIOLOGY | Facility: HOSPITAL | Age: 84
Discharge: HOME | End: 2019-09-12
Admitting: NURSE PRACTITIONER

## 2019-09-12 VITALS — WEIGHT: 143.3 LBS | BODY MASS INDEX: 22.49 KG/M2 | HEIGHT: 67 IN

## 2019-09-12 DIAGNOSIS — E78.00 PURE HYPERCHOLESTEROLEMIA: ICD-10-CM

## 2019-09-12 DIAGNOSIS — J90 BILATERAL PLEURAL EFFUSION: ICD-10-CM

## 2019-09-12 DIAGNOSIS — Z79.01 WARFARIN ANTICOAGULATION: ICD-10-CM

## 2019-09-12 DIAGNOSIS — G45.9 TIA (TRANSIENT ISCHEMIC ATTACK): ICD-10-CM

## 2019-09-12 DIAGNOSIS — I48.91 ATRIAL FIBRILLATION, UNSPECIFIED TYPE (HCC): ICD-10-CM

## 2019-09-12 DIAGNOSIS — I50.9 CHRONIC CONGESTIVE HEART FAILURE, UNSPECIFIED HEART FAILURE TYPE (HCC): ICD-10-CM

## 2019-09-12 DIAGNOSIS — I47.29 NONSUSTAINED VENTRICULAR TACHYCARDIA (HCC): ICD-10-CM

## 2019-09-12 DIAGNOSIS — I25.10 CORONARY ARTERY DISEASE INVOLVING NATIVE CORONARY ARTERY OF NATIVE HEART WITHOUT ANGINA PECTORIS: ICD-10-CM

## 2019-09-12 DIAGNOSIS — Z85.6 PERSONAL HISTORY OF CLL (CHRONIC LYMPHOCYTIC LEUKEMIA): ICD-10-CM

## 2019-09-12 DIAGNOSIS — I27.20 PULMONARY HYPERTENSION (HCC): ICD-10-CM

## 2019-09-12 DIAGNOSIS — I10 ESSENTIAL HYPERTENSION: ICD-10-CM

## 2019-09-12 DIAGNOSIS — Z95.0 PACEMAKER: ICD-10-CM

## 2019-09-12 DIAGNOSIS — J44.9 CHRONIC OBSTRUCTIVE PULMONARY DISEASE, UNSPECIFIED COPD TYPE (HCC): ICD-10-CM

## 2019-09-12 DIAGNOSIS — Z86.718 PERSONAL HISTORY OF VENOUS THROMBOSIS AND EMBOLISM: ICD-10-CM

## 2019-09-12 LAB
BH CV NUCLEAR PRIOR STUDY: 2
BH CV STRESS BP STAGE 1: NORMAL
BH CV STRESS COMMENTS STAGE 1: NORMAL
BH CV STRESS DOSE REGADENOSON STAGE 1: 0.4
BH CV STRESS DURATION MIN STAGE 1: 0
BH CV STRESS DURATION SEC STAGE 1: 10
BH CV STRESS HR STAGE 1: 65
BH CV STRESS PROTOCOL 1: NORMAL
BH CV STRESS RECOVERY BP: NORMAL MMHG
BH CV STRESS RECOVERY HR: 63 BPM
BH CV STRESS STAGE 1: 1
LV EF NUC BP: 74 %
MAXIMAL PREDICTED HEART RATE: 132 BPM
PERCENT MAX PREDICTED HR: 49.24 %
STRESS BASELINE BP: NORMAL MMHG
STRESS BASELINE HR: 63 BPM
STRESS PERCENT HR: 58 %
STRESS POST EXERCISE DUR SEC: 10 SEC
STRESS POST PEAK BP: NORMAL MMHG
STRESS POST PEAK HR: 65 BPM
STRESS TARGET HR: 112 BPM

## 2019-09-12 PROCEDURE — 93018 CV STRESS TEST I&R ONLY: CPT | Performed by: INTERNAL MEDICINE

## 2019-09-12 PROCEDURE — 78492 MYOCRD IMG PET MLT RST&STRS: CPT

## 2019-09-12 PROCEDURE — A9555 RB82 RUBIDIUM: HCPCS | Performed by: NURSE PRACTITIONER

## 2019-09-12 PROCEDURE — 25010000002 REGADENOSON 0.4 MG/5ML SOLUTION: Performed by: NURSE PRACTITIONER

## 2019-09-12 PROCEDURE — 93016 CV STRESS TEST SUPVJ ONLY: CPT | Performed by: INTERNAL MEDICINE

## 2019-09-12 PROCEDURE — 0 RUBIDIUM CHLORIDE: Performed by: NURSE PRACTITIONER

## 2019-09-12 PROCEDURE — 78492 MYOCRD IMG PET MLT RST&STRS: CPT | Performed by: INTERNAL MEDICINE

## 2019-09-12 PROCEDURE — 93017 CV STRESS TEST TRACING ONLY: CPT

## 2019-09-12 RX ADMIN — REGADENOSON 0.4 MG: 0.08 INJECTION, SOLUTION INTRAVENOUS at 13:28

## 2019-09-12 RX ADMIN — RUBIDIUM CHLORIDE RB-82 1 DOSE: 150 INJECTION, SOLUTION INTRAVENOUS at 13:15

## 2019-09-12 RX ADMIN — RUBIDIUM CHLORIDE RB-82 1 DOSE: 150 INJECTION, SOLUTION INTRAVENOUS at 13:28

## 2019-09-26 ENCOUNTER — TELEPHONE (OUTPATIENT)
Dept: CARDIOLOGY | Facility: CLINIC | Age: 84
End: 2019-09-26

## 2019-09-26 ENCOUNTER — OFFICE VISIT (OUTPATIENT)
Dept: CARDIOLOGY | Facility: CLINIC | Age: 84
End: 2019-09-26

## 2019-09-26 VITALS
SYSTOLIC BLOOD PRESSURE: 110 MMHG | HEIGHT: 67 IN | HEART RATE: 67 BPM | DIASTOLIC BLOOD PRESSURE: 70 MMHG | WEIGHT: 141 LBS | BODY MASS INDEX: 22.13 KG/M2

## 2019-09-26 DIAGNOSIS — I50.33 ACUTE ON CHRONIC DIASTOLIC CHF (CONGESTIVE HEART FAILURE) (HCC): ICD-10-CM

## 2019-09-26 DIAGNOSIS — N28.9 RENAL INSUFFICIENCY: ICD-10-CM

## 2019-09-26 DIAGNOSIS — I48.19 ATRIAL FIBRILLATION, PERSISTENT (HCC): Primary | ICD-10-CM

## 2019-09-26 PROCEDURE — 99214 OFFICE O/P EST MOD 30 MIN: CPT | Performed by: INTERNAL MEDICINE

## 2019-09-26 PROCEDURE — 93000 ELECTROCARDIOGRAM COMPLETE: CPT | Performed by: INTERNAL MEDICINE

## 2019-09-26 RX ORDER — ASPIRIN 325 MG
650 TABLET ORAL DAILY
COMMUNITY
End: 2019-11-26 | Stop reason: HOSPADM

## 2019-10-03 ENCOUNTER — LAB (OUTPATIENT)
Dept: LAB | Facility: HOSPITAL | Age: 84
End: 2019-10-03

## 2019-10-03 ENCOUNTER — ANTICOAGULATION VISIT (OUTPATIENT)
Dept: PHARMACY | Facility: HOSPITAL | Age: 84
End: 2019-10-03

## 2019-10-03 ENCOUNTER — HOSPITAL ENCOUNTER (OUTPATIENT)
Dept: GENERAL RADIOLOGY | Facility: HOSPITAL | Age: 84
Discharge: HOME | End: 2019-10-03
Admitting: INTERNAL MEDICINE

## 2019-10-03 ENCOUNTER — TELEPHONE (OUTPATIENT)
Dept: CARDIOLOGY | Facility: CLINIC | Age: 84
End: 2019-10-03

## 2019-10-03 DIAGNOSIS — I50.33 ACUTE ON CHRONIC DIASTOLIC CHF (CONGESTIVE HEART FAILURE) (HCC): ICD-10-CM

## 2019-10-03 DIAGNOSIS — N28.9 RENAL INSUFFICIENCY: ICD-10-CM

## 2019-10-03 DIAGNOSIS — I50.33 ACUTE ON CHRONIC DIASTOLIC CHF (CONGESTIVE HEART FAILURE) (HCC): Primary | ICD-10-CM

## 2019-10-03 LAB
ANION GAP SERPL CALCULATED.3IONS-SCNC: 14.2 MMOL/L (ref 5–15)
BUN BLD-MCNC: 27 MG/DL (ref 8–23)
BUN/CREAT SERPL: 21.4 (ref 7–25)
CALCIUM SPEC-SCNC: 9.1 MG/DL (ref 8.6–10.5)
CHLORIDE SERPL-SCNC: 97 MMOL/L (ref 98–107)
CO2 SERPL-SCNC: 26.8 MMOL/L (ref 22–29)
CREAT BLD-MCNC: 1.26 MG/DL (ref 0.76–1.27)
GFR SERPL CREATININE-BSD FRML MDRD: 54 ML/MIN/1.73
GLUCOSE BLD-MCNC: 88 MG/DL (ref 65–99)
INR PPP: 3 (ref 0.91–1.09)
NT-PROBNP SERPL-MCNC: 5571 PG/ML (ref 5–1800)
POTASSIUM BLD-SCNC: 4.1 MMOL/L (ref 3.5–5.2)
PROTHROMBIN TIME: 35.8 SECONDS (ref 10–13.8)
SODIUM BLD-SCNC: 138 MMOL/L (ref 136–145)

## 2019-10-03 PROCEDURE — 85610 PROTHROMBIN TIME: CPT

## 2019-10-03 PROCEDURE — 36416 COLLJ CAPILLARY BLOOD SPEC: CPT

## 2019-10-03 PROCEDURE — 80048 BASIC METABOLIC PNL TOTAL CA: CPT

## 2019-10-03 PROCEDURE — 83880 ASSAY OF NATRIURETIC PEPTIDE: CPT

## 2019-10-03 PROCEDURE — 36415 COLL VENOUS BLD VENIPUNCTURE: CPT

## 2019-10-03 PROCEDURE — 71046 X-RAY EXAM CHEST 2 VIEWS: CPT

## 2019-10-03 NOTE — PROGRESS NOTES
Anticoagulation Clinic Progress Note    Anticoagulation Summary  As of 10/3/2019    INR goal:   2.0-3.0   TTR:   74.8 % (2 y)   INR used for dosing:   3.0 (10/3/2019)   Warfarin maintenance plan:   5 mg every Mon, Fri; 3.75 mg all other days   Weekly warfarin total:   28.75 mg   No change documented:   Dot Earl, Pharmacy Intern   Plan last modified:   Ady Anderson RPH (1/11/2019)   Next INR check:   10/29/2019   Priority:   Maintenance   Target end date:   Indefinite    Indications    Atrial fibrillation (CMS/HCC) [I48.91]             Anticoagulation Episode Summary     INR check location:       Preferred lab:       Send INR reminders to:   [facility]    Comments:         Anticoagulation Care Providers     Provider Role Specialty Phone number    Nidhi Juarez MD Referring Cardiology [phone]          Clinic Interview:  Patient Findings     Negatives:   Signs/symptoms of thrombosis, Signs/symptoms of bleeding,   Laboratory test error suspected, Change in health, Change in alcohol use,   Change in activity, Upcoming invasive procedure, Emergency department   visit, Upcoming dental procedure, Missed doses, Extra doses, Change in   medications, Change in diet/appetite, Hospital admission, Bruising, Other   complaints      Clinical Outcomes     Negatives:   Major bleeding event, Thromboembolic event,   Anticoagulation-related hospital admission, Anticoagulation-related ED   visit, Anticoagulation-related fatality        INR History:  Anticoagulation Monitoring 7/29/2019 9/3/2019 10/3/2019   INR 2.6 2.8 3.0   INR Date 7/29/2019 9/3/2019 10/3/2019   INR Goal 2.0-3.0 2.0-3.0 2.0-3.0   Trend Same Same Same   Last Week Total 28.75 mg 28.75 mg 28.75 mg   Next Week Total 28.75 mg 28.75 mg 28.75 mg   Sun 3.75 mg 3.75 mg 3.75 mg   Mon 5 mg 5 mg 5 mg   Tue 3.75 mg 3.75 mg 3.75 mg   Wed 3.75 mg 3.75 mg 3.75 mg   Thu 3.75 mg 3.75 mg 3.75 mg   Fri 5 mg 5 mg 5 mg   Sat 3.75 mg 3.75 mg 3.75 mg   Visit  Report - - -   Some recent data might be hidden       Plan:  1. INR is Therapeutic today- see above in Anticoagulation Summary.  Will instruct Harry Ventura to Continue their warfarin regimen- see above in Anticoagulation Summary.  2. Follow up in 4 weeks  3. Patient declines warfarin refills.  4. Verbal and written information provided. Patient expresses understanding and has no further questions at this time.    Dot Earl, Pharmacy Intern

## 2019-10-03 NOTE — PROGRESS NOTES
I have supervised and reviewed the notes, assessments, and/or procedures performed by Dot Earl, PharmD Candidate. I concur with her documentation of this patient.    Nani Rao RP

## 2019-10-04 NOTE — TELEPHONE ENCOUNTER
This tell patient that his chest x-ray blood tests shows fluid buildup.  Have him increase Lasix to 40 mg p.o. twice daily check a BMP in 1 week.  Have him come in for an echo early next week.  Also find out when he is to see Dr. Grace.  I have set up a follow-up appointment with APRN in 3 to 4 weeks.mariposa

## 2019-10-04 NOTE — TELEPHONE ENCOUNTER
10/4/19  Pt left vmsg at 1:10 - returning call to scheduled the echo - he asked for a morning appt.   [phone]/eli

## 2019-10-08 RX ORDER — FUROSEMIDE 40 MG/1
40 TABLET ORAL 2 TIMES DAILY
Qty: 180 TABLET | Refills: 1 | Status: SHIPPED | OUTPATIENT
Start: 2019-10-08 | End: 2019-11-26 | Stop reason: SDUPTHER

## 2019-10-08 NOTE — TELEPHONE ENCOUNTER
Spoke with pt.  He wants this info left on his VM and he will call me in the AM.  Call pt and LMM on his VM.  Rx sent if he needs.  BMP order was already placed.  Call me back to let me know if or when he has appt with Dr Grace.        Tano- Pt needs to schedule an ECHO.  Per Dr Juarez- schedule this week.

## 2019-10-09 ENCOUNTER — OFFICE VISIT (OUTPATIENT)
Dept: WOUND CARE | Facility: HOSPITAL | Age: 84
End: 2019-10-09

## 2019-10-09 ENCOUNTER — APPOINTMENT (OUTPATIENT)
Dept: ONCOLOGY | Facility: CLINIC | Age: 84
End: 2019-10-09

## 2019-10-09 ENCOUNTER — APPOINTMENT (OUTPATIENT)
Dept: LAB | Facility: HOSPITAL | Age: 84
End: 2019-10-09

## 2019-10-09 PROCEDURE — G0463 HOSPITAL OUTPT CLINIC VISIT: HCPCS

## 2019-10-10 NOTE — TELEPHONE ENCOUNTER
Patient has been scheduled for an echo 10/18/19 and will get BMP done this date at the lab.  He has a follow up appt scheduled with NARINDER Zaidi on 10/31/19./ [email or web address]

## 2019-10-18 ENCOUNTER — APPOINTMENT (OUTPATIENT)
Dept: CARDIOLOGY | Facility: HOSPITAL | Age: 84
End: 2019-10-18

## 2019-10-22 ENCOUNTER — TELEPHONE (OUTPATIENT)
Dept: CARDIOLOGY | Facility: CLINIC | Age: 84
End: 2019-10-22

## 2019-10-22 NOTE — TELEPHONE ENCOUNTER
10/22/19  Patient called to confirm an appt on 10/29 for an echo - I confirmed.  I also mentioned an appt on the 31st with Anu - he didn't know he had this appt but can not come to this appt - his wife is having cataract surgery that day.  Please call patient and reschedule this appt with Anu  - pt ph [phone]/eli

## 2019-10-23 ENCOUNTER — OFFICE VISIT (OUTPATIENT)
Dept: WOUND CARE | Facility: HOSPITAL | Age: 84
End: 2019-10-23

## 2019-10-23 PROCEDURE — G0463 HOSPITAL OUTPT CLINIC VISIT: HCPCS

## 2019-10-29 ENCOUNTER — ANTICOAGULATION VISIT (OUTPATIENT)
Dept: PHARMACY | Facility: HOSPITAL | Age: 84
End: 2019-10-29

## 2019-10-29 ENCOUNTER — HOSPITAL ENCOUNTER (OUTPATIENT)
Dept: CARDIOLOGY | Facility: HOSPITAL | Age: 84
Discharge: HOME | End: 2019-10-29
Admitting: INTERNAL MEDICINE

## 2019-10-29 VITALS — WEIGHT: 144 LBS | BODY MASS INDEX: 22.6 KG/M2 | OXYGEN SATURATION: 96 % | HEIGHT: 67 IN | HEART RATE: 64 BPM

## 2019-10-29 DIAGNOSIS — I50.33 ACUTE ON CHRONIC DIASTOLIC CHF (CONGESTIVE HEART FAILURE) (HCC): ICD-10-CM

## 2019-10-29 LAB
BH CV ECHO MEAS - ACS: 1.2 CM
BH CV ECHO MEAS - AO MAX PG (FULL): 15.8 MMHG
BH CV ECHO MEAS - AO MAX PG: 18.4 MMHG
BH CV ECHO MEAS - AO MEAN PG (FULL): 10.7 MMHG
BH CV ECHO MEAS - AO MEAN PG: 12.2 MMHG
BH CV ECHO MEAS - AO ROOT AREA (BSA CORRECTED): 2.1
BH CV ECHO MEAS - AO ROOT AREA: 10.5 CM^2
BH CV ECHO MEAS - AO ROOT DIAM: 3.7 CM
BH CV ECHO MEAS - AO V2 MAX: 214.8 CM/SEC
BH CV ECHO MEAS - AO V2 MEAN: 168.6 CM/SEC
BH CV ECHO MEAS - AO V2 VTI: 46.9 CM
BH CV ECHO MEAS - AVA(I,A): 1.3 CM^2
BH CV ECHO MEAS - AVA(I,D): 1.3 CM^2
BH CV ECHO MEAS - AVA(V,A): 1.2 CM^2
BH CV ECHO MEAS - AVA(V,D): 1.2 CM^2
BH CV ECHO MEAS - BSA(HAYCOCK): 1.8 M^2
BH CV ECHO MEAS - BSA: 1.8 M^2
BH CV ECHO MEAS - BZI_BMI: 22.6 KILOGRAMS/M^2
BH CV ECHO MEAS - BZI_METRIC_HEIGHT: 170.2 CM
BH CV ECHO MEAS - BZI_METRIC_WEIGHT: 65.3 KG
BH CV ECHO MEAS - EDV(MOD-SP4): 52 ML
BH CV ECHO MEAS - EDV(TEICH): 203.9 ML
BH CV ECHO MEAS - EF(CUBED): 75.7 %
BH CV ECHO MEAS - EF(MOD-BP): 52 %
BH CV ECHO MEAS - EF(MOD-SP4): 51.9 %
BH CV ECHO MEAS - EF(TEICH): 66.6 %
BH CV ECHO MEAS - ESV(MOD-SP4): 25 ML
BH CV ECHO MEAS - ESV(TEICH): 68.2 ML
BH CV ECHO MEAS - FS: 37.6 %
BH CV ECHO MEAS - IVS/LVPW: 0.9
BH CV ECHO MEAS - IVSD: 1 CM
BH CV ECHO MEAS - LAT PEAK E' VEL: 11 CM/SEC
BH CV ECHO MEAS - LV DIASTOLIC VOL/BSA (35-75): 29.6 ML/M^2
BH CV ECHO MEAS - LV MASS(C)D: 290.9 GRAMS
BH CV ECHO MEAS - LV MASS(C)DI: 165.4 GRAMS/M^2
BH CV ECHO MEAS - LV MAX PG: 2.7 MMHG
BH CV ECHO MEAS - LV MEAN PG: 1.5 MMHG
BH CV ECHO MEAS - LV SYSTOLIC VOL/BSA (12-30): 14.2 ML/M^2
BH CV ECHO MEAS - LV V1 MAX: 82 CM/SEC
BH CV ECHO MEAS - LV V1 MEAN: 57.5 CM/SEC
BH CV ECHO MEAS - LV V1 VTI: 20.1 CM
BH CV ECHO MEAS - LVIDD: 6.3 CM
BH CV ECHO MEAS - LVIDS: 4 CM
BH CV ECHO MEAS - LVLD AP4: 6.3 CM
BH CV ECHO MEAS - LVLS AP4: 5.8 CM
BH CV ECHO MEAS - LVOT AREA (M): 3.1 CM^2
BH CV ECHO MEAS - LVOT AREA: 3.1 CM^2
BH CV ECHO MEAS - LVOT DIAM: 2 CM
BH CV ECHO MEAS - LVPWD: 1.1 CM
BH CV ECHO MEAS - MED PEAK E' VEL: 9 CM/SEC
BH CV ECHO MEAS - MV DEC SLOPE: 629.2 CM/SEC^2
BH CV ECHO MEAS - MV DEC TIME: 0.18 SEC
BH CV ECHO MEAS - MV E MAX VEL: 134 CM/SEC
BH CV ECHO MEAS - MV MAX PG: 7 MMHG
BH CV ECHO MEAS - MV MEAN PG: 3.1 MMHG
BH CV ECHO MEAS - MV P1/2T MAX VEL: 140 CM/SEC
BH CV ECHO MEAS - MV P1/2T: 65.2 MSEC
BH CV ECHO MEAS - MV V2 MAX: 132.7 CM/SEC
BH CV ECHO MEAS - MV V2 MEAN: 77.9 CM/SEC
BH CV ECHO MEAS - MV V2 VTI: 26.9 CM
BH CV ECHO MEAS - MVA P1/2T LCG: 1.6 CM^2
BH CV ECHO MEAS - MVA(P1/2T): 3.4 CM^2
BH CV ECHO MEAS - MVA(VTI): 2.3 CM^2
BH CV ECHO MEAS - PA MAX PG (FULL): 3 MMHG
BH CV ECHO MEAS - PA MAX PG: 5.4 MMHG
BH CV ECHO MEAS - PA V2 MAX: 115.8 CM/SEC
BH CV ECHO MEAS - PVA(V,A): 1.9 CM^2
BH CV ECHO MEAS - PVA(V,D): 1.9 CM^2
BH CV ECHO MEAS - QP/QS: 0.63
BH CV ECHO MEAS - RAP SYSTOLE: 8 MMHG
BH CV ECHO MEAS - RV MAX PG: 2.4 MMHG
BH CV ECHO MEAS - RV MEAN PG: 1.2 MMHG
BH CV ECHO MEAS - RV V1 MAX: 77.6 CM/SEC
BH CV ECHO MEAS - RV V1 MEAN: 49.3 CM/SEC
BH CV ECHO MEAS - RV V1 VTI: 13.7 CM
BH CV ECHO MEAS - RVOT AREA: 2.8 CM^2
BH CV ECHO MEAS - RVOT DIAM: 1.9 CM
BH CV ECHO MEAS - RVSP: 64 MMHG
BH CV ECHO MEAS - SI(AO): 281.1 ML/M^2
BH CV ECHO MEAS - SI(CUBED): 109.5 ML/M^2
BH CV ECHO MEAS - SI(LVOT): 35 ML/M^2
BH CV ECHO MEAS - SI(MOD-SP4): 15.4 ML/M^2
BH CV ECHO MEAS - SI(TEICH): 77.2 ML/M^2
BH CV ECHO MEAS - SV(AO): 494.4 ML
BH CV ECHO MEAS - SV(CUBED): 192.6 ML
BH CV ECHO MEAS - SV(LVOT): 61.5 ML
BH CV ECHO MEAS - SV(MOD-SP4): 27 ML
BH CV ECHO MEAS - SV(RVOT): 38.7 ML
BH CV ECHO MEAS - SV(TEICH): 135.7 ML
BH CV ECHO MEAS - TAPSE (>1.6): 1.4 CM2
BH CV ECHO MEAS - TR MAX VEL: 373.4 CM/SEC
BH CV ECHO MEASUREMENTS AVERAGE E/E' RATIO: 13.4
BH CV XLRA - RV BASE: 3.4 CM
BH CV XLRA - TDI S': 10 CM/SEC
INR PPP: 2.8 (ref 0.91–1.09)
LEFT ATRIUM VOLUME INDEX: 29 ML/M2
LEFT ATRIUM VOLUME: 53 CM3
MAXIMAL PREDICTED HEART RATE: 132 BPM
PROTHROMBIN TIME: 34 SECONDS (ref 10–13.8)
SINUS: 3.4 CM
STJ: 3.3 CM
STRESS TARGET HR: 112 BPM

## 2019-10-29 PROCEDURE — 93306 TTE W/DOPPLER COMPLETE: CPT | Performed by: INTERNAL MEDICINE

## 2019-10-29 PROCEDURE — 36416 COLLJ CAPILLARY BLOOD SPEC: CPT

## 2019-10-29 PROCEDURE — 25010000002 PERFLUTREN (DEFINITY) 8.476 MG IN SODIUM CHLORIDE 0.9 % 10 ML INJECTION: Performed by: INTERNAL MEDICINE

## 2019-10-29 PROCEDURE — 85610 PROTHROMBIN TIME: CPT

## 2019-10-29 PROCEDURE — 93306 TTE W/DOPPLER COMPLETE: CPT

## 2019-10-29 RX ADMIN — PERFLUTREN 1.5 ML: 6.52 INJECTION, SUSPENSION INTRAVENOUS at 11:48

## 2019-10-29 NOTE — PROGRESS NOTES
Anticoagulation Clinic Progress Note    Anticoagulation Summary  As of 10/29/2019    INR goal:   2.0-3.0   TTR:   75.7 % (2 y)   INR used for dosin.8 (10/29/2019)   Warfarin maintenance plan:   5 mg every Mon, Fri; 3.75 mg all other days   Weekly warfarin total:   28.75 mg   No change documented:   Priscilla Mccall   Plan last modified:   Ady Anderson RPH (2019)   Next INR check:   2019   Priority:   Maintenance   Target end date:   Indefinite    Indications    Atrial fibrillation (CMS/HCC) [I48.91]             Anticoagulation Episode Summary     INR check location:       Preferred lab:       Send INR reminders to:    [facility]    Comments:         Anticoagulation Care Providers     Provider Role Specialty Phone number    Nidhi Juarez MD Referring Cardiology [phone]          Clinic Interview:  Patient Findings     Positives:   Missed doses    Negatives:   Signs/symptoms of thrombosis, Signs/symptoms of bleeding,   Laboratory test error suspected, Change in health, Change in alcohol use,   Change in activity, Upcoming invasive procedure, Emergency department   visit, Upcoming dental procedure, Extra doses, Change in medications,   Change in diet/appetite, Hospital admission, Bruising, Other complaints      Clinical Outcomes     Negatives:   Major bleeding event, Thromboembolic event,   Anticoagulation-related hospital admission, Anticoagulation-related ED   visit, Anticoagulation-related fatality        INR History:  Anticoagulation Monitoring 9/3/2019 10/3/2019 10/29/2019   INR 2.8 3.0 2.8   INR Date 9/3/2019 10/3/2019 10/29/2019   INR Goal 2.0-3.0 2.0-3.0 2.0-3.0   Trend Same Same Same   Last Week Total 28.75 mg 28.75 mg 20 mg   Next Week Total 28.75 mg 28.75 mg 28.75 mg   Sun 3.75 mg 3.75 mg 3.75 mg   Mon 5 mg 5 mg 5 mg   Tue 3.75 mg 3.75 mg 3.75 mg   Wed 3.75 mg 3.75 mg 3.75 mg   Thu 3.75 mg 3.75 mg 3.75 mg   Fri 5 mg 5 mg 5 mg   Sat 3.75 mg 3.75 mg 3.75 mg    Visit Report - - -   Some recent data might be hidden       Plan:  1. INR is therapeutic today- see above in Anticoagulation Summary.   Will instruct Harry Ventura to continue their warfarin regimen- see above in Anticoagulation Summary.  2. Follow up in 4 weeks.  3. Patient declines warfarin refills.  4. Verbal and written information provided. Patient expresses understanding and has no further questions at this time.    Priscilla Mccall

## 2019-10-30 NOTE — TELEPHONE ENCOUNTER
Spoke with pt.  He did NOT do the lab.  Told him he needs this this week.  It was very important to keep an eye on his kidney function since increasing the Lasix.  Pt said he has appt with Nephrologist on 12/3/2019 @ 1:15 with them.  Again strongly advised the BMP needs to be done ASAP.  Pt agreeable and will have this done by Friday.  Pt also asked about ECHO results.  Please call him at [phone].

## 2019-10-30 NOTE — TELEPHONE ENCOUNTER
I talked to patient regarding echocardiogram results.  He has been told he has not had sleep apnea in the past.  He is not using a CPAP.  He does see Dr. Grace.  He states he feels as good as he did in 2015 when his pulmonary hypertension was first noted with similar right ventricular pressures.  I recommended he see Dr. Grace to consider evaluation for more simple interventions to treat pulmonary hypertension.  He can also discuss further invasive treatment options and investigation such as right heart cath the patient is hesitant to do so at this point.  He will contact Dr. Alcaraz.  I have also placed a call to Dr. Grace. mariposa

## 2019-10-31 ENCOUNTER — LAB (OUTPATIENT)
Dept: LAB | Facility: HOSPITAL | Age: 84
End: 2019-10-31

## 2019-10-31 DIAGNOSIS — I50.33 ACUTE ON CHRONIC DIASTOLIC CHF (CONGESTIVE HEART FAILURE) (HCC): ICD-10-CM

## 2019-10-31 DIAGNOSIS — N28.9 RENAL INSUFFICIENCY: ICD-10-CM

## 2019-10-31 LAB
ANION GAP SERPL CALCULATED.3IONS-SCNC: 15 MMOL/L (ref 5–15)
BUN BLD-MCNC: 36 MG/DL (ref 8–23)
BUN/CREAT SERPL: 26.3 (ref 7–25)
CALCIUM SPEC-SCNC: 9.2 MG/DL (ref 8.6–10.5)
CHLORIDE SERPL-SCNC: 95 MMOL/L (ref 98–107)
CO2 SERPL-SCNC: 28 MMOL/L (ref 22–29)
CREAT BLD-MCNC: 1.37 MG/DL (ref 0.76–1.27)
GFR SERPL CREATININE-BSD FRML MDRD: 49 ML/MIN/1.73
GLUCOSE BLD-MCNC: 116 MG/DL (ref 65–99)
POTASSIUM BLD-SCNC: 3.8 MMOL/L (ref 3.5–5.2)
SODIUM BLD-SCNC: 138 MMOL/L (ref 136–145)

## 2019-10-31 PROCEDURE — 36415 COLL VENOUS BLD VENIPUNCTURE: CPT

## 2019-10-31 PROCEDURE — 80048 BASIC METABOLIC PNL TOTAL CA: CPT

## 2019-10-31 NOTE — TELEPHONE ENCOUNTER
I talked with Dr. Grace who will see the patient probably next week to address noninvasive approach/additional medications to treat, hypertension.  Called the patient got voicemail.  Left message to call back please let the patient know that this and have him call Dr. Grace's office to arrange for an appointment.  Jeannie

## 2019-10-31 NOTE — TELEPHONE ENCOUNTER
Pt did have the labs today.      He verbalized understanding and will await call from Dr Grace's office about an appt.  I set myself a reminder to make sure the pt is taken care of.

## 2019-11-04 ENCOUNTER — APPOINTMENT (OUTPATIENT)
Dept: LAB | Facility: HOSPITAL | Age: 84
End: 2019-11-04

## 2019-11-04 ENCOUNTER — APPOINTMENT (OUTPATIENT)
Dept: ONCOLOGY | Facility: CLINIC | Age: 84
End: 2019-11-04

## 2019-11-04 DIAGNOSIS — D63.1 ANEMIA OF CHRONIC RENAL FAILURE, STAGE 3 (MODERATE) (HCC): Primary | ICD-10-CM

## 2019-11-04 DIAGNOSIS — N18.30 ANEMIA OF CHRONIC RENAL FAILURE, STAGE 3 (MODERATE) (HCC): Primary | ICD-10-CM

## 2019-11-06 ENCOUNTER — APPOINTMENT (OUTPATIENT)
Dept: WOUND CARE | Facility: HOSPITAL | Age: 84
End: 2019-11-06

## 2019-11-07 ENCOUNTER — TELEPHONE (OUTPATIENT)
Dept: CARDIOLOGY | Facility: CLINIC | Age: 84
End: 2019-11-07

## 2019-11-07 NOTE — TELEPHONE ENCOUNTER
Pt called re: appt with Dr Grace.  No one from that office has call.      Janine- Call Dr Grace to followup on an appt.

## 2019-11-15 NOTE — TELEPHONE ENCOUNTER
Spoke with Dr Grace's office and they have not heard about this pt needing an early appt.  Last seen September and next appt March.  Im going to check with pt if he wants me to make or any day is ok?

## 2019-11-18 NOTE — TELEPHONE ENCOUNTER
11/18/19  Spoke with patient - he is not having any new pulmonary problems.  I confirmed with [place] at Dr. Grace's ofc - pt has appt on March 3 at 1:15 - I gave patient this information.  /eli

## 2019-11-25 NOTE — TELEPHONE ENCOUNTER
Please arrange for follow-up appointment with Dr. Grace in the next several weeks.  I ready discussed this patient with Dr. Grace previously

## 2019-11-26 ENCOUNTER — CLINICAL SUPPORT NO REQUIREMENTS (OUTPATIENT)
Dept: CARDIOLOGY | Facility: CLINIC | Age: 84
End: 2019-11-26

## 2019-11-26 ENCOUNTER — APPOINTMENT (OUTPATIENT)
Dept: PHARMACY | Facility: HOSPITAL | Age: 84
End: 2019-11-26

## 2019-11-26 ENCOUNTER — OFFICE VISIT (OUTPATIENT)
Dept: CARDIOLOGY | Facility: CLINIC | Age: 84
End: 2019-11-26

## 2019-11-26 VITALS
BODY MASS INDEX: 22.16 KG/M2 | HEART RATE: 60 BPM | DIASTOLIC BLOOD PRESSURE: 80 MMHG | SYSTOLIC BLOOD PRESSURE: 146 MMHG | OXYGEN SATURATION: 98 % | WEIGHT: 141.2 LBS | HEIGHT: 67 IN

## 2019-11-26 DIAGNOSIS — Z95.0 PACEMAKER: ICD-10-CM

## 2019-11-26 DIAGNOSIS — I50.32 CHRONIC DIASTOLIC CONGESTIVE HEART FAILURE (HCC): ICD-10-CM

## 2019-11-26 DIAGNOSIS — I27.20 PULMONARY HYPERTENSION (HCC): ICD-10-CM

## 2019-11-26 DIAGNOSIS — J44.9 CHRONIC OBSTRUCTIVE PULMONARY DISEASE, UNSPECIFIED COPD TYPE (HCC): ICD-10-CM

## 2019-11-26 DIAGNOSIS — G45.9 TIA (TRANSIENT ISCHEMIC ATTACK): ICD-10-CM

## 2019-11-26 DIAGNOSIS — I48.19 ATRIAL FIBRILLATION, PERSISTENT (HCC): Primary | ICD-10-CM

## 2019-11-26 DIAGNOSIS — I48.20 CHRONIC ATRIAL FIBRILLATION (HCC): Primary | ICD-10-CM

## 2019-11-26 PROCEDURE — 93288 INTERROG EVL PM/LDLS PM IP: CPT | Performed by: INTERNAL MEDICINE

## 2019-11-26 PROCEDURE — 99214 OFFICE O/P EST MOD 30 MIN: CPT | Performed by: NURSE PRACTITIONER

## 2019-11-26 RX ORDER — FUROSEMIDE 40 MG/1
40 TABLET ORAL DAILY
Qty: 180 TABLET | Refills: 1
Start: 2019-11-26

## 2019-11-26 RX ORDER — POTASSIUM CHLORIDE 750 MG/1
10 TABLET, EXTENDED RELEASE ORAL DAILY
Qty: 180 TABLET | Refills: 0
Start: 2019-11-26 | End: 2020-03-02

## 2019-11-26 NOTE — PROGRESS NOTES
Date of Office Visit: 19  Encounter Provider: NARINDER Zaidi  Place of Service: [facility] CARDIOLOGY  Patient Name: Harry Ventura  :1930    Chief Complaint   Patient presents with   • Follow-up   • Coronary Artery Disease   • Shortness of Breath   • Congestive Heart Failure   • Atrial Fibrillation   :     HPI: Harry Ventura is a 88 y.o. male  with history of atrial fibrillation,coronary artery disease, chronic diastolic congestive heart failure, pulmonary hypertension, hypertension, hyperlipidemia,sick sinus syndrome status post PPM, recurrent right pleural effusion s/p thoracentesis, alcohol dependence, and chronic lymphocytic leukemia.     In  he was admitted with medical noncompliance and dietary noncompliance with heart failure.  He was noted to have pulmonary hypertension 2015 in the setting of diastolic dysfunction with an RVSP of 67 mmHg.     He had a transthoracic echocardiogram in 2016 which showed normal left ventricular systolic function, mild aortic regurgitation, mild-to-moderate tricuspid regurgitation, mild mitral regurgitation and RVSP of 68 mmHg.  His last perfusion stress test was in May 2016 that showed no evidence of ischemia with a normal left ventricular ejection fraction of 62%.  He was taken to the catheterization lab on 2016 for pulmonary hypertension to have a right and left heart cath.  The right heart catheterization revealed significant diastolic dysfunction and elevated biventricular pressures. He had normal left ventricular systolic function, mild coronary disease, mild pulmonary hypertension and was to be managed medically with diuretic.     He was admitted in 2017 with facial droop.  He was diagnosed with TIA.  His INR was subtherapeutic at 1.2 and he was given Lovenox until his INR became therapeutic.  Therapy was added to his regimen.  Remained on Coumadin due to financial burden of Eliquis which was  recommended.  At another echocardiogram that showed normal systolic function, mild mitral regurgitation, trivial to mild aortic valve regurgitation and no evidence of intra--atrial shunting with saline injection.  His creatinine was slightly elevated at 1.3 with a GFR of 52.    He was hospitalized in April 2018 for right VAT with Dr. Serrano.  He tolerated the surgery well without complication.  He was extubated in recovery and transferred to intensive care for close observation.  On his second postoperative night became very confused and disoriented.  There was some suspicion for alcohol withdrawal and he was placed on withdrawal protocol.  He later was transferred to the telemetry abrams with physical therapy as he was quite weak.  Because of the confusion and weakness was difficult for him to eat without aspiration.  With the confusion he pulled out his feeding tube and was evaluated by speech therapy.  All chest tubes were removed by the fifth postoperative day.  His chest x-rays remained stable.  He was evaluated by cardiology due to chronic atrial fibrillation and chronic anticoagulation.  He developed some hypertension and was started on losartan.  His BNP was elevated.  He is maintained on oral diuretic.  He was discharged on 5/3/2018 to be followed by [facility] for speech and occupational therapy.      He then had issues with fluid retention.  He was noted to have elevated proBNP at the end of September 2019.  We increased his diuretics but his kidney function declined so we cut back on that and referred him to nephrology.  He presents today and reports that his daily weights have been stable between 139-142 pounds.  He is checking those almost daily at home.  He does add salt to his diet but reports that is mild.  He has an appointment with nephrology the first week of December which he plans to keep.  He denies orthopnea.  He relates his shortness of breath to history of pulmonary hypertension.  He has  an intermittent cough which is chronic and unchanged.  He has occasional hoarseness as well.  He denies trouble swallowing food he occasionally gets choked up on a pill which he believes is normal.  He denies chest pain tightness pressure, palpitation, dizziness, lightheadedness, edema.  He does not perform any structured exercise.  He lives at home and his wife reports that everything at home is okay.        No Known Allergies    Past Medical History:   Diagnosis Date   • Accelerated essential hypertension    • Anemia    • Atrial fibrillation (CMS/[facility])    • CAP (community acquired pneumonia)    • CHF (congestive heart failure) (CMS/[facility])    • Chronic anticoagulation    • Chronic diastolic CHF (congestive heart failure) (CMS/[facility])    • CLL (chronic lymphocytic leukemia) (CMS/[facility])     Stage 0   • COPD (chronic obstructive pulmonary disease) (CMS/[facility])    • Coronary artery disease    • ED (erectile dysfunction) of non-organic origin    • Gait instability    • GERD (gastroesophageal reflux disease)    • Glucose intolerance (malabsorption)    • Hematuria    • History of thrombocytopenia     Mild   • Hypercholesterolemia    • Hypertension     Pulmonary   • Hypogonadism male    • Insomnia    • Iron overload    • Low back pain    • Macrocytosis    • NSVT (nonsustained ventricular tachycardia) (CMS/[facility])    • Pacemaker    • Personal history of CLL (chronic lymphocytic leukemia)    • Personal history of venous thrombosis and embolism    • Pleural effusion    • Pulmonary hypertension (CMS/[facility])    • Renal insufficiency    • Seborrheic dermatitis    • Shortness of breath    • SSS (sick sinus syndrome) (CMS/[facility])    • TIA (transient ischemic attack) 2017       Past Surgical History:   Procedure Laterality Date   • CARDIAC CATHETERIZATION N/A 6/17/2016    Procedure: Right and Left Heart Cath;  Surgeon: Harry Bolton MD;  Location: [facility] INVASIVE LOCATION;  Service:    • CARDIAC CATHETERIZATION N/A 6/17/2016    Procedure: Coronary  "angiography;  Surgeon: Harry Bolton MD;  Location: [facility] INVASIVE LOCATION;  Service:    • CARDIAC CATHETERIZATION N/A 6/17/2016    Procedure: Left Heart Cath;  Surgeon: Harry Bolton MD;  Location: [facility] INVASIVE LOCATION;  Service:    • CARDIAC CATHETERIZATION N/A 6/17/2016    Procedure: Left ventriculography;  Surgeon: Harry Bolton MD;  Location: [facility] INVASIVE LOCATION;  Service:    • CHOLECYSTECTOMY  1983   • PACEMAKER IMPLANTATION     • THORACOSCOPY Right 4/20/2018    Procedure: BRONCHOSCOPY, RIGHT VIDEO ASSISTED THORACOSCOPY, RIGHT LUNG DECORTICATION WITH PLEURAL BIOPSY, PLEURODESIS;  Surgeon: Erich Serrano III, MD;  Location: [facility] OR;  Service: Cardiothoracic   • UMBILICAL HERNIA REPAIR  2006    Performed laparoscopically by Dr. Garcia.         Family and social history reviewed.     Review of Systems   Constitution: Positive for malaise/fatigue.   Cardiovascular: Negative for orthopnea and paroxysmal nocturnal dyspnea.   Respiratory: Positive for cough and shortness of breath.      All other systems were reviewed and are negative          Objective:     Vitals:    11/26/19 1440   BP: 146/80   Pulse: 60   SpO2: 98%   Weight: 64 kg (141 lb 3.2 oz)   Height: 170.2 cm (67\")     Body mass index is 22.12 kg/m².    PHYSICAL EXAM:  Physical Exam   Constitutional: He is oriented to person, place, and time. He appears well-developed and well-nourished. No distress.   HENT:   Head: Normocephalic.   Eyes: Conjunctivae are normal.   Neck: Normal range of motion. No JVD present.   Cardiovascular: Normal rate, regular rhythm, normal heart sounds and intact distal pulses.   No murmur heard.  Pulses:       Carotid pulses are 2+ on the right side, and 2+ on the left side.       Radial pulses are 2+ on the right side, and 2+ on the left side.        Posterior tibial pulses are 2+ on the right side, and 2+ on the left side.   Pulmonary/Chest: Effort normal. No respiratory distress. He has decreased breath " sounds in the right middle field and the right lower field. He has no wheezes. He has no rhonchi. He has no rales. He exhibits no tenderness.   Abdominal: Soft. Bowel sounds are normal. He exhibits no distension.   Musculoskeletal: Normal range of motion. He exhibits no edema.   Neurological: He is alert and oriented to person, place, and time.   Skin: Skin is warm, dry and intact. No rash noted. He is not diaphoretic. No cyanosis.   Psychiatric: He has a normal mood and affect. His behavior is normal. Judgment and thought content normal.       Procedures-not performed today due to device interrogation no angina    Current Outpatient Medications   Medication Sig Dispense Refill   • Aspirin-Acetaminophen-Caffeine (EXCEDRIN PO) Take  by mouth As Needed.     • atorvastatin (LIPITOR) 40 MG tablet Take 1 tablet by mouth Daily. 90 tablet 3   • furosemide (LASIX) 40 MG tablet Take 1 tablet by mouth Daily. 180 tablet 1   • guaiFENesin (MUCINEX) 600 MG 12 hr tablet Take 1,200 mg by mouth 2 (Two) Times a Day.     • metoprolol tartrate (LOPRESSOR) 25 MG tablet Take 1.5 tablets by mouth 2 (Two) Times a Day. 90 tablet 1   • O2 (OXYGEN) Inhale 2 L/min 1 (One) Time. As needed     • omeprazole (priLOSEC) 20 MG capsule TAKE 1 CAPSULE BY MOUTH IN THE MORNING before breakfast   30 capsule 5   • potassium chloride (K-DUR,KLOR-CON) 10 MEQ CR tablet Take 1 tablet by mouth Daily. 180 tablet 0   • warfarin (JANTOVEN) 2.5 MG tablet take 1 and 1/2 tablets by mouth for 5 days a week and take 2 tabs for the other 2 days 60 tablet 0     No current facility-administered medications for this visit.      Assessment:       Diagnosis Plan   1. Atrial fibrillation, persistent     2. Chronic obstructive pulmonary disease, unspecified COPD type (CMS/HCC)     3. Pulmonary hypertension (CMS/HCC)     4. Pacemaker     5. TIA (transient ischemic attack)     6. Chronic diastolic congestive heart failure (CMS/HCC)          No orders of the defined types were  placed in this encounter.        Plan:   1.  Chronic diastolic heart failure preserved left ventricular systolic function on echo October 2019.  His weights are currently stable on 40 mg of Lasix daily.  He is to follow-up with nephrology at the beginning of December  2.  Chronic atrial fibrillation anticoagulated with warfarin  3.  Sick sinus syndrome status post permanent pacemaker.  He follows in our device clinic which was interrogated today.  His ventricular pacing has increased since January when he was a 53% and in May he was 77% and since August in the 80% range.  Adjustments were made to try rate response and adjust the sensor rate.  If he does not feel that he tolerates that he is to notify our office  4.  History of recurrent right pleural effusion status post VAT  5. for history of pulmonary hypertension he follows with Dr. Grace  6.  History of ventricular tachycardia no episodes on device interrogated today  7.  History of alcohol dependence/abuse he drinks 3-4 daily.  Discussed with him limiting that to 2 or less daily he is to consider it  8.  COPD follows with pulmonary  9.  CLL followed by a CBC  10.  Mild coronary artery disease on cardiac catheterization June 2016 no angina  11.  Renal insufficiency he is to follow-up with nephrology      Follow-up in 4 months call with questions or concerns or significant weight gain.          It has been a pleasure to participate in this patient's care.      Thank you,  NARINDER Zaidi      **I used Dragon to dictate this note:**

## 2019-12-02 ENCOUNTER — APPOINTMENT (OUTPATIENT)
Dept: PHARMACY | Facility: HOSPITAL | Age: 84
End: 2019-12-02

## 2019-12-04 ENCOUNTER — ANTICOAGULATION VISIT (OUTPATIENT)
Dept: PHARMACY | Facility: HOSPITAL | Age: 84
End: 2019-12-04

## 2019-12-04 LAB
INR PPP: 3.6 (ref 0.91–1.09)
PROTHROMBIN TIME: 43.1 SECONDS (ref 10–13.8)

## 2019-12-04 PROCEDURE — 36416 COLLJ CAPILLARY BLOOD SPEC: CPT

## 2019-12-04 PROCEDURE — G0463 HOSPITAL OUTPT CLINIC VISIT: HCPCS

## 2019-12-04 PROCEDURE — 85610 PROTHROMBIN TIME: CPT

## 2019-12-04 NOTE — PROGRESS NOTES
I have supervised and reviewed the notes, assessments, and/or procedures performed by Wero Morin, PharmD Candidate. The documented assessment and plan were developed cooperatively, and the plan was implemented in my presence. I concur with his documentation of this patient.    Killian Rowell [facility]

## 2019-12-04 NOTE — PROGRESS NOTES
Anticoagulation Clinic Progress Note    Anticoagulation Summary  As of 12/4/2019    INR goal:   2.0-3.0   TTR:   73.3 % (2.1 y)   INR used for dosing:   3.6! (12/4/2019)   Warfarin maintenance plan:   5 mg every Mon, Fri; 3.75 mg all other days   Weekly warfarin total:   28.75 mg   Plan last modified:   Ady Anderson RPH (1/11/2019)   Next INR check:   12/18/2019   Priority:   Maintenance   Target end date:   Indefinite    Indications    Atrial fibrillation (CMS/HCC) [I48.91]             Anticoagulation Episode Summary     INR check location:       Preferred lab:       Send INR reminders to:    [facility] POOL    Comments:         Anticoagulation Care Providers     Provider Role Specialty Phone number    Nidhi Juarez MD Referring Cardiology [phone]          Clinic Interview:  Patient Findings     Positives:   Change in alcohol use, Upcoming dental procedure    Negatives:   Signs/symptoms of thrombosis, Signs/symptoms of bleeding,   Laboratory test error suspected, Change in health, Change in activity,   Upcoming invasive procedure, Emergency department visit, Missed doses,   Extra doses, Change in medications, Change in diet/appetite, Hospital   admission, Bruising, Other complaints    Comments:   Scheduled to have 3 teeth extracted 12/4; drank 3-4 alcoholic   beverages since 11/30      Clinical Outcomes     Negatives:   Major bleeding event, Thromboembolic event,   Anticoagulation-related hospital admission, Anticoagulation-related ED   visit, Anticoagulation-related fatality    Comments:   Scheduled to have 3 teeth extracted 12/4; drank 3-4 alcoholic   beverages since 11/30        INR History:  Anticoagulation Monitoring 10/3/2019 10/29/2019 12/4/2019   INR 3.0 2.8 3.6   INR Date 10/3/2019 10/29/2019 12/4/2019   INR Goal 2.0-3.0 2.0-3.0 2.0-3.0   Trend Same Same Same   Last Week Total 28.75 mg 20 mg 28.75 mg   Next Week Total 28.75 mg 28.75 mg 26.25 mg   Sun 3.75 mg 3.75 mg 3.75 mg   Mon 5 mg  5 mg 5 mg   Tue 3.75 mg 3.75 mg 3.75 mg   Wed 3.75 mg 3.75 mg 1.25 mg (12/4); Otherwise 3.75 mg   Thu 3.75 mg 3.75 mg 3.75 mg   Fri 5 mg 5 mg 5 mg   Sat 3.75 mg 3.75 mg 3.75 mg   Visit Report - - -   Some recent data might be hidden       Plan:  1. INR is Supratherapeutic today- see above in Anticoagulation Summary.  Will instruct Harry Monzonnes to Change their warfarin regimen- see above in Anticoagulation Summary.  2. Follow up in 2 weeks  3. Patient declines warfarin refills.  4. Verbal and written information provided. Patient expresses understanding and has no further questions at this time.    Foster Morin, Pharmacy Intern

## 2019-12-09 ENCOUNTER — ANTICOAGULATION VISIT (OUTPATIENT)
Dept: PHARMACY | Facility: HOSPITAL | Age: 84
End: 2019-12-09

## 2019-12-09 LAB
INR PPP: 1.7 (ref 0.91–1.09)
PROTHROMBIN TIME: 21 SECONDS (ref 10–13.8)

## 2019-12-09 PROCEDURE — G0463 HOSPITAL OUTPT CLINIC VISIT: HCPCS

## 2019-12-09 PROCEDURE — 85610 PROTHROMBIN TIME: CPT

## 2019-12-09 PROCEDURE — 36416 COLLJ CAPILLARY BLOOD SPEC: CPT

## 2019-12-10 NOTE — PROGRESS NOTES
Anticoagulation Clinic Progress Note    Anticoagulation Summary  As of 2019    INR goal:   2.0-3.0   TTR:   73.2 % (2.1 y)   INR used for dosin.7! (2019)   Warfarin maintenance plan:   5 mg every Mon, Fri; 3.75 mg all other days   Weekly warfarin total:   28.75 mg   Plan last modified:   Ady Anderson RP (2019)   Next INR check:   2019   Priority:   Maintenance   Target end date:   Indefinite    Indications    Atrial fibrillation (CMS/HCC) [I48.91]             Anticoagulation Episode Summary     INR check location:       Preferred lab:       Send INR reminders to:    [facility]    Comments:         Anticoagulation Care Providers     Provider Role Specialty Phone number    Nidhi Juarez MD Referring Cardiology [phone]          Clinic Interview:      INR History:  Anticoagulation Monitoring 10/29/2019 2019 2019   INR 2.8 3.6 1.7   INR Date 10/29/2019 2019 2019   INR Goal 2.0-3.0 2.0-3.0 2.0-3.0   Trend Same Same Same   Last Week Total 20 mg 28.75 mg 26.25 mg   Next Week Total 28.75 mg 26.25 mg 20 mg   Sun 3.75 mg 3.75 mg 3.75 mg   Mon 5 mg 5 mg Hold ()   Tue 3.75 mg 3.75 mg 2.5 mg (12/10)   Wed 3.75 mg 1.25 mg (); Otherwise 3.75 mg 3.75 mg   Thu 3.75 mg 3.75 mg 3.75 mg   Fri 5 mg 5 mg 2.5 mg ()   Sat 3.75 mg 3.75 mg 3.75 mg   Visit Report - - -   Some recent data might be hidden       Plan:  1. INR is Subtherapeutic yesterday and pt states he held warfarin dose on  due to confusion with INR 1.7-- see above in Anticoagulation Summary.  Will instruct Harry Ventura to Change their warfarin regimen as he needs to have INR ~2 next week for dental surgery- see above in Anticoagulation Summary.  2. Follow up in 1 week  3. Patient declines warfarin refills.  4. Verbal and written information provided. Patient expresses understanding and has no further questions at this time.    Nani Rao, [facility]

## 2019-12-13 ENCOUNTER — ANTICOAGULATION VISIT (OUTPATIENT)
Dept: PHARMACY | Facility: HOSPITAL | Age: 84
End: 2019-12-13

## 2019-12-13 LAB
INR PPP: 1.5 (ref 0.91–1.09)
PROTHROMBIN TIME: 17.7 SECONDS (ref 10–13.8)

## 2019-12-13 PROCEDURE — 85610 PROTHROMBIN TIME: CPT

## 2019-12-13 PROCEDURE — 36416 COLLJ CAPILLARY BLOOD SPEC: CPT

## 2019-12-13 PROCEDURE — G0463 HOSPITAL OUTPT CLINIC VISIT: HCPCS

## 2019-12-13 RX ORDER — WARFARIN SODIUM 2.5 MG/1
TABLET ORAL
Qty: 60 TABLET | Refills: 0 | Status: SHIPPED | OUTPATIENT
Start: 2019-12-13 | End: 2020-01-20

## 2019-12-13 NOTE — PROGRESS NOTES
Anticoagulation Clinic Progress Note    Anticoagulation Summary  As of 2019    INR goal:   2.0-3.0   TTR:   72.8 % (2.1 y)   INR used for dosin.5! (2019)   Warfarin maintenance plan:   5 mg every Mon, Fri; 3.75 mg all other days   Weekly warfarin total:   28.75 mg   Plan last modified:   Ady Anderson RP (2019)   Next INR check:   2019   Priority:   Maintenance   Target end date:   Indefinite    Indications    Atrial fibrillation (CMS/HCC) [I48.91]             Anticoagulation Episode Summary     INR check location:       Preferred lab:       [facility]    Comments:         Anticoagulation Care Providers     Provider Role Specialty Phone number    Nidhi Juarez MD Referring Cardiology [phone]          Clinic Interview:      INR History:  Anticoagulation Monitoring 2019   INR 3.6 1.7 1.5   INR Date 2019   INR Goal 2.0-3.0 2.0-3.0 2.0-3.0   Trend Same Same Same   Last Week Total 28.75 mg 26.25 mg 22.5 mg   Next Week Total 26.25 mg 20 mg 23.75 mg   Sun 3.75 mg 3.75 mg 2.5 mg (12/15)   Mon 5 mg Hold () -   Tue 3.75 mg 2.5 mg (12/10) -   Wed 1.25 mg (); Otherwise 3.75 mg 3.75 mg -   Thu 3.75 mg 3.75 mg -   Fri 5 mg 2.5 mg () 5 mg   Sat 3.75 mg 3.75 mg 2.5 mg ()   Visit Report - - -   Some recent data might be hidden       Plan:  1. INR is Subtherapeutic today- see above in Anticoagulation Summary.  Will instruct Harry Ventura to Change their warfarin regimen- see above in Anticoagulation Summary.  2. Follow up in 3 days  3. Patient declines warfarin refills.  4. Verbal and written information provided. Patient expresses understanding and has no further questions at this time.    Nani Rao [facility]

## 2019-12-16 ENCOUNTER — ANTICOAGULATION VISIT (OUTPATIENT)
Dept: PHARMACY | Facility: HOSPITAL | Age: 84
End: 2019-12-16

## 2019-12-16 LAB
INR PPP: 1.7 (ref 0.91–1.09)
PROTHROMBIN TIME: 19.8 SECONDS (ref 10–13.8)

## 2019-12-16 PROCEDURE — 36416 COLLJ CAPILLARY BLOOD SPEC: CPT

## 2019-12-16 PROCEDURE — 85610 PROTHROMBIN TIME: CPT

## 2019-12-16 NOTE — PROGRESS NOTES
Anticoagulation Clinic Progress Note    Anticoagulation Summary  As of 2019    INR goal:   2.0-3.0   TTR:   72.5 % (2.2 y)   INR used for dosin.7! (2019)   Warfarin maintenance plan:   5 mg every Mon, Fri; 3.75 mg all other days   Weekly warfarin total:   28.75 mg   Plan last modified:   Ady Anderson RPH (2019)   Next INR check:   2019   Priority:   Maintenance   Target end date:   Indefinite    Indications    Atrial fibrillation (CMS/HCC) [I48.91]             Anticoagulation Episode Summary     INR check location:       Preferred lab:       Send INR reminders to:    [facility]IN[facility]    Comments:         Anticoagulation Care Providers     Provider Role Specialty Phone number    Nidhi Juarez MD Referring Cardiology [phone]          Clinic Interview:  Patient Findings     Positives:   Change in alcohol use, Upcoming invasive procedure, Other   complaints    Negatives:   Signs/symptoms of thrombosis, Signs/symptoms of bleeding,   Laboratory test error suspected, Change in health, Change in activity,   Emergency department visit, Upcoming dental procedure, Missed doses, Extra   doses, Change in medications, Change in diet/appetite, Hospital admission,   Bruising    Comments:   Sm amt of EtOH over weekend. Pt uncertain if he may have   missed yesterday's dose. Dental extraction tomorrow (INR needs to be ~2,   <2.3 per pt report of dentist's instructions).      Clinical Outcomes     Negatives:   Major bleeding event, Thromboembolic event,   Anticoagulation-related hospital admission, Anticoagulation-related ED   visit, Anticoagulation-related fatality    Comments:   Sm amt of EtOH over weekend. Pt uncertain if he may have   missed yesterday's dose. Dental extraction tomorrow (INR needs to be ~2,   <2.3 per pt report of dentist's instructions).        INR History:  Anticoagulation Monitoring 2019   INR 1.7 1.5 1.7   INR Date 2019  12/16/2019   INR Goal 2.0-3.0 2.0-3.0 2.0-3.0   Trend Same Same Same   Last Week Total 26.25 mg 22.5 mg 20 mg   Next Week Total 20 mg 23.75 mg 27.5 mg   Sun 3.75 mg 2.5 mg (12/15) 3.75 mg   Mon Hold (12/9) - 2.5 mg (12/16)   Tue 2.5 mg (12/10) - 5 mg (12/17)   Wed 3.75 mg - 3.75 mg   Thu 3.75 mg - 3.75 mg   Fri 2.5 mg (12/13) 5 mg 5 mg   Sat 3.75 mg 2.5 mg (12/14) 3.75 mg   Visit Report - - -   Some recent data might be hidden       Plan:  1. INR is Subtherapeutic today- see above in Anticoagulation Summary.  Will instruct Harry Ventura to Change their warfarin regimen- see above in Anticoagulation Summary.  2. Follow up in 1 week  3. Patient declines warfarin refills.  4. Verbal and written information provided. Patient expresses understanding and has no further questions at this time.    Killian Rowell [facility]

## 2019-12-19 ENCOUNTER — TELEPHONE (OUTPATIENT)
Dept: CARDIOLOGY | Facility: CLINIC | Age: 84
End: 2019-12-19

## 2019-12-19 NOTE — TELEPHONE ENCOUNTER
Got a call from  so that he is concerned that the patient's heart rate needs to be higher to improve cardiac output as he believes he is intravascularly dry but still has rales and edema on x-ray with along with a pleural effusion.    Please see me regarding this.

## 2019-12-19 NOTE — TELEPHONE ENCOUNTER
He was programmed vvi at 60 and when he was here on 11/26/19 he was pacing 81% at the lrl from 8/13/19 to 11/26/19. Prior to that was 83%.  Judith spoke to Anu (she was seeing pt that day) and rate response was turned on with a max sensor rate of 100bpm.  I came over to talk to you, but you had already left.  I will be back tomorrow if you still need to talk to me.

## 2019-12-20 NOTE — TELEPHONE ENCOUNTER
I called.  No answer, lm that Dr. Juarez wants him to come in to have his pacemaker programming changed and for him to call for apt.

## 2019-12-20 NOTE — TELEPHONE ENCOUNTER
His base rate is 60, so it will stay 60 and gradually increase with activity.  It will depend on how active he is.  If you want it 75 would probably want to increase the lrl to 75.  EMELIK if you want me to call to bring him in.  As of now he is arielle to come in to clinic in March.

## 2019-12-20 NOTE — TELEPHONE ENCOUNTER
[place], thank you yes please have him come back and then increase lower rate limit to 75 bpm with rate response on.  . mariposa

## 2019-12-23 ENCOUNTER — CLINICAL SUPPORT NO REQUIREMENTS (OUTPATIENT)
Dept: CARDIOLOGY | Facility: CLINIC | Age: 84
End: 2019-12-23

## 2019-12-23 DIAGNOSIS — I48.20 CHRONIC ATRIAL FIBRILLATION (HCC): Primary | ICD-10-CM

## 2020-01-03 ENCOUNTER — TELEPHONE (OUTPATIENT)
Dept: CARDIOLOGY | Facility: CLINIC | Age: 85
End: 2020-01-03

## 2020-01-03 NOTE — TELEPHONE ENCOUNTER
1/3/20  I spoke with patient's wife - she states he is now making sense when he speaks.  She does not feel that 911 is warranted.  She does still ask that home health be ordered to help patient with things like taking a shower, which she says she can't help him do. They have had home health before.  She states that she is a nurse so she feels like he does not need 911 or to go to ER.  I told her I would present to Dr. Juarez and we will call her back with information as to whether home health was ordered.  Her ph [phone]/eli

## 2020-01-03 NOTE — TELEPHONE ENCOUNTER
JUST JAMI: Spoke with pt's wife.  Again strongly advised even though he is acting normal now does not mean that everything is ok.  He needs to have further workup.  Pt's wife wanted us to just call the ER so he did not have to wait all night.  Advised them we have no control with how long that wait is or if he will get right back.  MORE importantly is that he gets evaluation by the ER vs waiting.  Ok with pt's wife.  Verbalized understanding and they will go get him checked out.  (done)

## 2020-01-03 NOTE — TELEPHONE ENCOUNTER
There are  many reasons for the weakness that seems to be worse as well as the confusion which is new. I strongly recommend he go to the emergency room. We have not seen this patient in over two months. I cannot just order home health that has to come to the PCP but beyond sounds like he’s more sick and I would strongly recommend she have them go to the PCP or the ER today. At this point likely the emergency room.mariposa

## 2020-01-03 NOTE — TELEPHONE ENCOUNTER
"1/3/20  Patient's wife left [facility] at 12:08.  States her  is worse and is asking Dr. Juarez to request Home Health to help her with patient.  I called to ask her in what way is it that he is worse.  She stated that he could not walk to the bathroom without her helping him use a walker.  He is in bed now.  She also states he does not make sense when he speaks, saying things like \"We are finished here today so we should go to [facility]\".  ([facility] is their dghtr)  Her ph [phone]/eli  "

## 2020-01-06 NOTE — TELEPHONE ENCOUNTER
Does not look like the wife took the patient to the ER on Friday.  Please follow-up with them and see if they have an appointment with the PCP.. mariposa

## 2020-01-10 NOTE — TELEPHONE ENCOUNTER
Not surprising.  This is exactly why she needed to take him to the ER.  Now admitted with sepsis pneumonia and parapneumonic effusion.  Hopefully he will be able to treat this well enough to leave the hospital soon.  He will need to see PCP for follow-up of the pneumonia and then arrange follow-up with us in 4 weeks unless instructed otherwise.

## 2020-01-11 NOTE — TELEPHONE ENCOUNTER
I am ok with 40mg every am and 20mg every afternoon. Please call him and tell him this is ok. New prescriptions sent.  
I spoke with patient and he verbalized understanding./ JYOTHI  
Patient called to ask if you would consider ordering Lasix 40 mg and 20 mg tablets for him so that he does not have to cut the 40 mg in half.  Per your last office note, you want him to take 30 mg bid.  He asks if he can take 40 mg in am and 20 mg in pm.  Please advise or call patient.  Thank you./ JYOTHI     Patient can be reached at [phone]  
11-Jan-2020 12:00

## 2020-01-13 ENCOUNTER — ANTICOAGULATION VISIT (OUTPATIENT)
Dept: PHARMACY | Facility: HOSPITAL | Age: 85
End: 2020-01-13

## 2020-01-13 LAB — INR PPP: 2.1

## 2020-01-20 RX ORDER — WARFARIN SODIUM 2.5 MG/1
TABLET ORAL
Qty: 60 TABLET | Refills: 0 | Status: SHIPPED | OUTPATIENT
Start: 2020-01-20 | End: 2020-02-17

## 2020-01-24 ENCOUNTER — TELEPHONE (OUTPATIENT)
Dept: PHARMACY | Facility: HOSPITAL | Age: 85
End: 2020-01-24

## 2020-01-28 LAB — INR PPP: 4.3

## 2020-01-30 ENCOUNTER — ANTICOAGULATION VISIT (OUTPATIENT)
Dept: PHARMACY | Facility: HOSPITAL | Age: 85
End: 2020-01-30

## 2020-01-30 LAB — INR PPP: 3.5

## 2020-01-30 NOTE — PROGRESS NOTES
Anticoagulation Clinic Progress Note    Anticoagulation Summary  As of 1/30/2020    INR goal:   2.0-3.0   TTR:   70.2 % (2.3 y)   INR used for dosing:   3.50! (1/30/2020)   Warfarin maintenance plan:   3.75 mg every day   Weekly warfarin total:   26.25 mg   Plan last modified:   Nani Rao RPH (1/30/2020)   Next INR check:   2/3/2020   Priority:   Maintenance   Target end date:   Indefinite    Indications    Atrial fibrillation (CMS/HCC) [I48.91]             Anticoagulation Episode Summary     INR check location:       Preferred lab:       Send INR reminders to:   [facility] CLINICAL [place]    Comments:         Anticoagulation Care Providers     Provider Role Specialty Phone number    Nidhi Juarez MD Referring Cardiology [phone]          INR History:  Anticoagulation Monitoring 12/16/2019 1/13/2020 1/30/2020   INR 1.7 2.10 3.50   INR Date 12/16/2019 1/13/2020 1/30/2020   INR Goal 2.0-3.0 2.0-3.0 2.0-3.0   Trend Same Same Down   Last Week Total 20 mg 28.75 mg 21.25 mg   Next Week Total 27.5 mg 28.75 mg 23.75 mg   Sun 3.75 mg 3.75 mg 3.75 mg   Mon 2.5 mg (12/16) 5 mg -   Tue 5 mg (12/17) 3.75 mg -   Wed 3.75 mg 3.75 mg -   Thu 3.75 mg 3.75 mg 2.5 mg (1/30)   Fri 5 mg 5 mg 3.75 mg   Sat 3.75 mg 3.75 mg 2.5 mg (2/1)   Visit Report - - -   Some recent data might be hidden       Plan:  1. INR is Supratherapeutic today- see above in Anticoagulation Summary.   Provided instructions to Rosie with [employer] to Change their warfarin regimen- see above in Anticoagulation Summary.  2. Follow up in 4 days  3. RN (Rosie from [employer]) reported that pt states he fell on Fri 1/24 with bruise on coccyx that appears to be healing.  Also pt stated he hit head but no bruising noted and did not seek evaluation.  No bleeding issues noted and patient has been instructed to seek evaluation for any trauma/falls while on anticoagulation.      Nani Rao RPH

## 2020-02-03 ENCOUNTER — ANTICOAGULATION VISIT (OUTPATIENT)
Dept: PHARMACY | Facility: HOSPITAL | Age: 85
End: 2020-02-03

## 2020-02-03 LAB — INR PPP: 2.3

## 2020-02-03 NOTE — PROGRESS NOTES
Anticoagulation Clinic Progress Note    Anticoagulation Summary  As of 2/3/2020    INR goal:   2.0-3.0   TTR:   70.1 % (2.3 y)   INR used for dosin.30 (2/3/2020)   Warfarin maintenance plan:   3.75 mg every day   Weekly warfarin total:   26.25 mg   No change documented:   Killian Rowell RPH   Plan last modified:   Nani Rao RPH (2020)   Next INR check:   2020   Priority:   Maintenance   Target end date:   Indefinite    Indications    Atrial fibrillation (CMS/HCC) [I48.91]             Anticoagulation Episode Summary     INR check location:       Preferred lab:       Send INR reminders to:    [facility] CLINICAL POOL    Comments:         Anticoagulation Care Providers     Provider Role Specialty Phone number    Nidhi Juarez MD Referring Cardiology [phone]          INR History:  Anticoagulation Monitoring 2020 2020 2/3/2020   INR 2.10 3.50 2.30   INR Date 2020 2020 2/3/2020   INR Goal 2.0-3.0 2.0-3.0 2.0-3.0   Trend Same Down Same   Last Week Total 28.75 mg 21.25 mg 17.5 mg   Next Week Total 28.75 mg 23.75 mg 26.25 mg   Sun 3.75 mg 3.75 mg -   Mon 5 mg - 3.75 mg   Tue 3.75 mg - 3.75 mg   Wed 3.75 mg - 3.75 mg   Thu 3.75 mg 2.5 mg () -   Fri 5 mg 3.75 mg -   Sat 3.75 mg 2.5 mg () -   Visit Report - - -   Some recent data might be hidden       Plan:  1. INR is Therapeutic today- see above in Anticoagulation Summary.   Provided instructions to Rosie with [facility] to Change their warfarin regimen- see above in Anticoagulation Summary.  2. Follow up in 3 days      Killian Rowell RPH

## 2020-02-06 ENCOUNTER — ANTICOAGULATION VISIT (OUTPATIENT)
Dept: PHARMACY | Facility: HOSPITAL | Age: 85
End: 2020-02-06

## 2020-02-06 LAB — INR PPP: 2.9

## 2020-02-06 NOTE — PROGRESS NOTES
Anticoagulation Clinic Progress Note    Anticoagulation Summary  As of 2020    INR goal:   2.0-3.0   TTR:   70.3 % (2.3 y)   INR used for dosin.90 (2020)   Warfarin maintenance plan:   3.75 mg every day   Weekly warfarin total:   26.25 mg   Plan last modified:   Nani Rao RPH (2020)   Next INR check:   2/10/2020   Priority:   Maintenance   Target end date:   Indefinite    Indications    Atrial fibrillation (CMS/HCC) [I48.91]             Anticoagulation Episode Summary     INR check location:       Preferred lab:       [facility]    Comments:         Anticoagulation Care Providers     Provider Role Specialty Phone number    Nidhi Juarez MD Referring Cardiology [phone]          INR History:  Anticoagulation Monitoring 2020 2/3/2020 2020   INR 3.50 2.30 2.90   INR Date 2020 2/3/2020 2020   INR Goal 2.0-3.0 2.0-3.0 2.0-3.0   Trend Down Same Same   Last Week Total 21.25 mg 17.5 mg 23.75 mg   Next Week Total 23.75 mg 26.25 mg 25 mg   Sun 3.75 mg - 3.75 mg   Mon - 3.75 mg -   Tue - 3.75 mg -   Wed - 3.75 mg -   Thu 2.5 mg () - 2.5 mg ()   Fri 3.75 mg - 3.75 mg   Sat 2.5 mg () - 3.75 mg   Visit Report - - -   Some recent data might be hidden       Plan:  1. INR is Therapeutic today- see above in Anticoagulation Summary.   Provided instructions to Rosie with [facility] to Change their warfarin regimen- see above in Anticoagulation Summary.  2. Follow up in 4 days      Killian Rowell RPH

## 2020-02-10 ENCOUNTER — ANTICOAGULATION VISIT (OUTPATIENT)
Dept: PHARMACY | Facility: HOSPITAL | Age: 85
End: 2020-02-10

## 2020-02-10 LAB — INR PPP: 3.1

## 2020-02-10 NOTE — PROGRESS NOTES
Anticoagulation Clinic Progress Note    Anticoagulation Summary  As of 2/10/2020    INR goal:   2.0-3.0   TTR:   70.2 % (2.3 y)   INR used for dosing:   3.10! (2/10/2020)   Warfarin maintenance plan:   5 mg every Mon, Fri; 3.75 mg all other days   Weekly warfarin total:   28.75 mg   Plan last modified:   Jennifer Dodd [facility] (2/10/2020)   Next INR check:   2/13/2020   Priority:   Maintenance   Target end date:   Indefinite    Indications    Atrial fibrillation (CMS/HCC) [I48.91]             Anticoagulation Episode Summary     INR check location:       Preferred lab:       Send INR reminders to:    [facility] CLINICAL POOL    Comments:         Anticoagulation Care Providers     Provider Role Specialty Phone number    Nidhi Juarez MD Referring Cardiology [phone]          INR History:  Anticoagulation Monitoring 2/3/2020 2/6/2020 2/10/2020   INR 2.30 2.90 3.10   INR Date 2/3/2020 2/6/2020 2/10/2020   INR Goal 2.0-3.0 2.0-3.0 2.0-3.0   Trend Same Same Up   Last Week Total 17.5 mg 23.75 mg 22.5 mg   Next Week Total 26.25 mg 25 mg 27.5 mg   Sun - 3.75 mg -   Mon 3.75 mg - 5 mg   Tue 3.75 mg - 3.75 mg   Wed 3.75 mg - 2.5 mg (2/12)   Thu - 2.5 mg (2/6) -   Fri - 3.75 mg -   Sat - 3.75 mg -   Visit Report - - -   Some recent data might be hidden       Plan:  1. INR is Supratherapeutic today- see above in Anticoagulation Summary.   Provided instructions to Rosie with [facility] to Change their warfarin regimen: Take 2.5mg daily except for Tuesday take 3.75mg  2. Follow up in 3 days      Jennifer Dodd [facility]

## 2020-02-13 ENCOUNTER — ANTICOAGULATION VISIT (OUTPATIENT)
Dept: PHARMACY | Facility: HOSPITAL | Age: 85
End: 2020-02-13

## 2020-02-13 LAB — INR PPP: 2.5

## 2020-02-13 NOTE — PROGRESS NOTES
Anticoagulation Clinic Progress Note    Anticoagulation Summary  As of 2020    INR goal:   2.0-3.0   TTR:   70.2 % (2.3 y)   INR used for dosin.50 (2020)   Warfarin maintenance plan:   3.75 mg every Tue, Thu, Sat; 2.5 mg all other days   Weekly warfarin total:   21.25 mg   Plan last modified:   Killian Rowell RPH (2020)   Next INR check:   2020   Priority:   Maintenance   Target end date:   Indefinite    Indications    Atrial fibrillation (CMS/HCC) [I48.91]             Anticoagulation Episode Summary     INR check location:       Preferred lab:       Send INR reminders to:   [facility] CLINICAL POOL    Comments:         Anticoagulation Care Providers     Provider Role Specialty Phone number    Nidhi Juarez MD Referring Cardiology [phone]          INR History:  Anticoagulation Monitoring 2020 2/10/2020 2020   INR 2.90 3.10 2.50   INR Date 2020 2/10/2020 2020   INR Goal 2.0-3.0 2.0-3.0 2.0-3.0   Trend Same Up Down   Last Week Total 23.75 mg 22.5 mg 20 mg   Next Week Total 25 mg 25 mg 21.25 mg   Sun 3.75 mg - 2.5 mg   Mon - 2.5 mg (2/10) 2.5 mg   Tue - 3.75 mg -   Wed - 2.5 mg () -   Thu 2.5 mg () - 3.75 mg   Fri 3.75 mg - 2.5 mg   Sat 3.75 mg - 3.75 mg   Visit Report - - -   Some recent data might be hidden       Plan:  1. INR is Therapeutic today- see above in Anticoagulation Summary.   Provided instructions to Rosie with [facility] to Change their warfarin regimen- see above in Anticoagulation Summary.  2. Follow up in 5 days      Killian Rowell RPH

## 2020-02-17 RX ORDER — OMEPRAZOLE 20 MG/1
CAPSULE, DELAYED RELEASE ORAL
Qty: 30 CAPSULE | Refills: 0 | OUTPATIENT
Start: 2020-02-17

## 2020-02-17 RX ORDER — WARFARIN SODIUM 2.5 MG/1
TABLET ORAL
Qty: 60 TABLET | Refills: 0 | Status: SHIPPED | OUTPATIENT
Start: 2020-02-17 | End: 2020-05-08 | Stop reason: SDUPTHER

## 2020-02-18 ENCOUNTER — ANTICOAGULATION VISIT (OUTPATIENT)
Dept: PHARMACY | Facility: HOSPITAL | Age: 85
End: 2020-02-18

## 2020-02-18 LAB — INR PPP: 2

## 2020-02-18 NOTE — PROGRESS NOTES
Anticoagulation Clinic Progress Note    Anticoagulation Summary  As of 2020    INR goal:   2.0-3.0   TTR:   70.4 % (2.3 y)   INR used for dosin.00 (2020)   Warfarin maintenance plan:   3.75 mg every Tue, Thu, Sat; 2.5 mg all other days   Weekly warfarin total:   21.25 mg   No change documented:   Jennifer Dodd [facility]   Plan last modified:   Killian Rowell [facility] (2020)   Next INR check:   2020   Priority:   Maintenance   Target end date:   Indefinite    Indications    Atrial fibrillation (CMS/HCC) [I48.91]             Anticoagulation Episode Summary     INR check location:       Preferred lab:       Send INR reminders to:    [facility]    Comments:         Anticoagulation Care Providers     Provider Role Specialty Phone number    Nidhi Juarez MD Referring Cardiology [phone]          INR History:  Anticoagulation Monitoring 2/10/2020 2020 2020   INR 3.10 2.50 2.00   INR Date 2/10/2020 2020 2020   INR Goal 2.0-3.0 2.0-3.0 2.0-3.0   Trend Up Down Same   Last Week Total 22.5 mg 20 mg 21.25 mg   Next Week Total 25 mg 21.25 mg 21.25 mg   Sun - 2.5 mg 2.5 mg   Mon 2.5 mg (2/10) 2.5 mg -   Tue 3.75 mg - 3.75 mg   Wed 2.5 mg () - 2.5 mg   Thu - 3.75 mg 3.75 mg   Fri - 2.5 mg 2.5 mg   Sat - 3.75 mg 3.75 mg   Visit Report - - -   Some recent data might be hidden       Plan:  1. INR is Therapeutic today- see above in Anticoagulation Summary.   Provided instructions to Rosie with [facility] to continue his current warfarin dosage regimen of 21.25mg - see above in Anticoagulation Summary.  2. Follow up in 6 days      Jennifer Dodd [facility]

## 2020-02-25 ENCOUNTER — ANTICOAGULATION VISIT (OUTPATIENT)
Dept: PHARMACY | Facility: HOSPITAL | Age: 85
End: 2020-02-25

## 2020-02-25 LAB — INR PPP: 1.9

## 2020-02-25 NOTE — PROGRESS NOTES
Anticoagulation Clinic Progress Note    Anticoagulation Summary  As of 2020    INR goal:   2.0-3.0   TTR:   69.8 % (2.3 y)   INR used for dosin.90! (2020)   Warfarin maintenance plan:   2.5 mg every Sun, Mon, Fri; 3.75 mg all other days   Weekly warfarin total:   22.5 mg   Plan last modified:   Jennifer Dodd [facility] (2020)   Next INR check:   3/3/2020   Priority:   Maintenance   Target end date:   Indefinite    Indications    Atrial fibrillation (CMS/HCC) [I48.91]             Anticoagulation Episode Summary     INR check location:       Preferred lab:       Send INR reminders to:    [facility] CLINICAL POOL    Comments:         Anticoagulation Care Providers     Provider Role Specialty Phone number    Nidhi Juarez MD Referring Cardiology [phone]          INR History:  Anticoagulation Monitoring 2020   INR 2.50 2.00 1.90   INR Date 2020   INR Goal 2.0-3.0 2.0-3.0 2.0-3.0   Trend Down Same Up   Last Week Total 20 mg 21.25 mg 21.25 mg   Next Week Total 21.25 mg 21.25 mg 22.5 mg   Sun 2.5 mg 2.5 mg 2.5 mg   Mon 2.5 mg - 2.5 mg   Tue - 3.75 mg 3.75 mg   Wed - 2.5 mg 3.75 mg   Thu 3.75 mg 3.75 mg 3.75 mg   Fri 2.5 mg 2.5 mg 2.5 mg   Sat 3.75 mg 3.75 mg 3.75 mg   Visit Report - - -   Some recent data might be hidden       Plan:  1. INR is Subtherapeutic today- see above in Anticoagulation Summary.   Provided instructions to Luis A with [facility] to increase his warfarin dosage regimen from 21.25mg to 22.5mg/week (5.9% increase)- see above in Anticoagulation Summary.  2. Follow up in 1 week      Jennifer Dodd RP

## 2020-03-02 RX ORDER — POTASSIUM CHLORIDE 750 MG/1
TABLET, EXTENDED RELEASE ORAL
Qty: 180 TABLET | Refills: 0 | Status: SHIPPED | OUTPATIENT
Start: 2020-03-02 | End: 2020-05-04

## 2020-03-02 RX ORDER — OMEPRAZOLE 20 MG/1
CAPSULE, DELAYED RELEASE ORAL
Qty: 30 CAPSULE | Refills: 0 | Status: SHIPPED | OUTPATIENT
Start: 2020-03-02 | End: 2020-03-30

## 2020-03-03 ENCOUNTER — ANTICOAGULATION VISIT (OUTPATIENT)
Dept: PHARMACY | Facility: HOSPITAL | Age: 85
End: 2020-03-03

## 2020-03-03 LAB — INR PPP: 1.8

## 2020-03-03 NOTE — PROGRESS NOTES
Anticoagulation Clinic Progress Note    Anticoagulation Summary  As of 3/3/2020    INR goal:   2.0-3.0   TTR:   69.2 % (2.4 y)   INR used for dosin.80! (3/3/2020)   Warfarin maintenance plan:   2.5 mg every Sun, Mon; 3.75 mg all other days   Weekly warfarin total:   23.75 mg   Plan last modified:   Killian Rowell RPH (3/3/2020)   Next INR check:   3/9/2020   Priority:   Maintenance   Target end date:   Indefinite    Indications    Atrial fibrillation (CMS/HCC) [I48.91]             Anticoagulation Episode Summary     INR check location:       Preferred lab:       Send INR reminders to:    [facility]    Comments:         Anticoagulation Care Providers     Provider Role Specialty Phone number    Nidhi Juarez MD Referring Cardiology [phone]        Pertinent info:  Rosie/[place]ORENCE ([phone]) reports pt took 2.5 mg MF, 3.75 mg rest of wk.    INR History:  Anticoagulation Monitoring 2020 2020 3/3/2020   INR 2.00 1.90 1.80   INR Date 2020 2020 3/3/2020   INR Goal 2.0-3.0 2.0-3.0 2.0-3.0   Trend Same Up Up   Last Week Total 21.25 mg 21.25 mg 23.75 mg   Next Week Total 21.25 mg 22.5 mg 25 mg   Sun 2.5 mg 2.5 mg 2.5 mg   Mon - 2.5 mg -   Tue 3.75 mg 3.75 mg 5 mg (3/3)   Wed 2.5 mg 3.75 mg 3.75 mg   Thu 3.75 mg 3.75 mg 3.75 mg   Fri 2.5 mg 2.5 mg 3.75 mg   Sat 3.75 mg 3.75 mg 3.75 mg   Visit Report - - -   Some recent data might be hidden       Plan:  1. INR is Subtherapeutic today- see above in Anticoagulation Summary.   Provided instructions to Rosie with [facility] to Increase their warfarin regimen- see above in Anticoagulation Summary.  2. Follow up in 1 week      Killian Rowell RPH

## 2020-03-09 ENCOUNTER — ANTICOAGULATION VISIT (OUTPATIENT)
Dept: PHARMACY | Facility: HOSPITAL | Age: 85
End: 2020-03-09

## 2020-03-09 LAB — INR PPP: 2.4

## 2020-03-09 NOTE — PROGRESS NOTES
Anticoagulation Clinic Progress Note    Anticoagulation Summary  As of 3/9/2020    INR goal:   2.0-3.0   TTR:   69.2 % (2.4 y)   INR used for dosin.40 (3/9/2020)   Warfarin maintenance plan:   2.5 mg every Mon; 3.75 mg all other days   Weekly warfarin total:   25 mg   Plan last modified:   Killian Rowell RPH (3/9/2020)   Next INR check:   3/16/2020   Priority:   Maintenance   Target end date:   Indefinite    Indications    Atrial fibrillation (CMS/HCC) [I48.91]             Anticoagulation Episode Summary     INR check location:       Preferred lab:       Send INR reminders to:    [facility] [place]    Comments:         Anticoagulation Care Providers     Provider Role Specialty Phone number    Nidhi Juarez MD Referring Cardiology [phone]        Pertinent info:  Took 2.5 mg Mon, 3.75 mg as instructed following boost dose of 5 mg last Tues.    INR History:  Anticoagulation Monitoring 2020 3/3/2020 3/9/2020   INR 1.90 1.80 2.40   INR Date 2020 3/3/2020 3/9/2020   INR Goal 2.0-3.0 2.0-3.0 2.0-3.0   Trend Up Up Up   Last Week Total 21.25 mg 23.75 mg 26.25 mg   Next Week Total 22.5 mg 25 mg 25 mg   Sun 2.5 mg 2.5 mg 3.75 mg   Mon 2.5 mg - 2.5 mg   Tue 3.75 mg 5 mg (3/3) 3.75 mg   Wed 3.75 mg 3.75 mg 3.75 mg   Thu 3.75 mg 3.75 mg 3.75 mg   Fri 2.5 mg 3.75 mg 3.75 mg   Sat 3.75 mg 3.75 mg 3.75 mg   Visit Report - - -   Some recent data might be hidden       Plan:  1. INR is Therapeutic today- see above in Anticoagulation Summary.   Provided instructions to Rosie with [facility] to Continue their warfarin regimen (2.5 mg Mon, 3.75 mg rest of wk) - see above in Anticoagulation Summary.  2. Follow up in 1 week      Killian Rowell RPH

## 2020-03-16 ENCOUNTER — ANTICOAGULATION VISIT (OUTPATIENT)
Dept: PHARMACY | Facility: HOSPITAL | Age: 85
End: 2020-03-16

## 2020-03-16 LAB — INR PPP: 3.3

## 2020-03-16 NOTE — PROGRESS NOTES
Anticoagulation Clinic Progress Note    Anticoagulation Summary  As of 3/16/2020    INR goal:   2.0-3.0   TTR:   69.2 % (2.4 y)   INR used for dosing:   3.30! (3/16/2020)   Warfarin maintenance plan:   2.5 mg every Mon, Fri; 3.75 mg all other days   Weekly warfarin total:   23.75 mg   Plan last modified:   Killian Rowell RPH (3/16/2020)   Next INR check:   3/23/2020   Priority:   Maintenance   Target end date:   Indefinite    Indications    Atrial fibrillation (CMS/HCC) [I48.91]             Anticoagulation Episode Summary     INR check location:       Preferred lab:       Send INR reminders to:    [facility] [place]    Comments:         Anticoagulation Care Providers     Provider Role Specialty Phone number    Nidhi Juarez MD Referring Cardiology [phone]          INR History:  Anticoagulation Monitoring 3/3/2020 3/9/2020 3/16/2020   INR 1.80 2.40 3.30   INR Date 3/3/2020 3/9/2020 3/16/2020   INR Goal 2.0-3.0 2.0-3.0 2.0-3.0   Trend Up Up Down   Last Week Total 23.75 mg 26.25 mg 25 mg   Next Week Total 25 mg 25 mg 23.75 mg   Sun 2.5 mg 3.75 mg 3.75 mg   Mon - 2.5 mg 2.5 mg   Tue 5 mg (3/3) 3.75 mg 3.75 mg   Wed 3.75 mg 3.75 mg 3.75 mg   Thu 3.75 mg 3.75 mg 3.75 mg   Fri 3.75 mg 3.75 mg 2.5 mg   Sat 3.75 mg 3.75 mg 3.75 mg   Visit Report - - -   Some recent data might be hidden       Plan:  1. INR is Supratherapeutic today- see above in Anticoagulation Summary.   Provided instructions to Rosie with [facility] to Change their warfarin regimen- see above in Anticoagulation Summary.  2. Follow up in 1 week      Killian Rowell RPH

## 2020-03-23 ENCOUNTER — ANTICOAGULATION VISIT (OUTPATIENT)
Dept: PHARMACY | Facility: HOSPITAL | Age: 85
End: 2020-03-23

## 2020-03-23 LAB — INR PPP: 3.8

## 2020-03-23 NOTE — PROGRESS NOTES
Anticoagulation Clinic Progress Note    Anticoagulation Summary  As of 3/23/2020    INR goal:   2.0-3.0   TTR:   68.7 % (2.4 y)   INR used for dosing:   3.80! (3/23/2020)   Warfarin maintenance plan:   2.5 mg every Mon, Wed, Fri; 3.75 mg all other days   Weekly warfarin total:   22.5 mg   Plan last modified:   Killian Rowell RPH (3/23/2020)   Next INR check:   3/30/2020   Priority:   Maintenance   Target end date:   Indefinite    Indications    Atrial fibrillation (CMS/HCC) [I48.91]             Anticoagulation Episode Summary     INR check location:       Preferred lab:       Send INR reminders to:    [facility]    Comments:         Anticoagulation Care Providers     Provider Role Specialty Phone number    Nidhi Juarez MD Referring Cardiology [phone]        Pertinent info:  Increased EtOH, coping with COVID-19 pandemic. Increased respiratory sx; nurse will contact clinic if abx is prescribed.     INR History:  Anticoagulation Monitoring 3/9/2020 3/16/2020 3/23/2020   INR 2.40 3.30 3.80   INR Date 3/9/2020 3/16/2020 3/23/2020   INR Goal 2.0-3.0 2.0-3.0 2.0-3.0   Trend Up Down Down   Last Week Total 26.25 mg 25 mg 23.75 mg   Next Week Total 25 mg 23.75 mg 20 mg   Sun 3.75 mg 3.75 mg 3.75 mg   Mon 2.5 mg 2.5 mg Hold (3/23)   Tue 3.75 mg 3.75 mg 3.75 mg   Wed 3.75 mg 3.75 mg 2.5 mg   Thu 3.75 mg 3.75 mg 3.75 mg   Fri 3.75 mg 2.5 mg 2.5 mg   Sat 3.75 mg 3.75 mg 3.75 mg   Visit Report - - -   Some recent data might be hidden       Plan:  1. INR is Supratherapeutic today- see above in Anticoagulation Summary.   Provided instructions to Rosie with [facility] to Change their warfarin regimen- see above in Anticoagulation Summary.  2. Follow up in 1 week      Killian Rowell RPH

## 2020-03-25 ENCOUNTER — TELEPHONE (OUTPATIENT)
Dept: CARDIOLOGY | Facility: CLINIC | Age: 85
End: 2020-03-25

## 2020-03-30 ENCOUNTER — ANTICOAGULATION VISIT (OUTPATIENT)
Dept: PHARMACY | Facility: HOSPITAL | Age: 85
End: 2020-03-30

## 2020-03-30 LAB — INR PPP: 2.8

## 2020-03-30 RX ORDER — OMEPRAZOLE 20 MG/1
CAPSULE, DELAYED RELEASE ORAL
Qty: 30 CAPSULE | Refills: 0 | Status: SHIPPED | OUTPATIENT
Start: 2020-03-30 | End: 2020-04-23 | Stop reason: SDUPTHER

## 2020-03-30 NOTE — PROGRESS NOTES
Anticoagulation Clinic Progress Note    Anticoagulation Summary  As of 3/30/2020    INR goal:   2.0-3.0   TTR:   68.3 % (2.4 y)   INR used for dosin.80 (3/30/2020)   Warfarin maintenance plan:   2.5 mg every Mon, Wed, Fri; 3.75 mg all other days   Weekly warfarin total:   22.5 mg   No change documented:   Killian Rowell RPH   Plan last modified:   Killian Rowell RPH (3/23/2020)   Next INR check:   2020   Priority:   Maintenance   Target end date:   Indefinite    Indications    Atrial fibrillation (CMS/HCC) [I48.91]             Anticoagulation Episode Summary     INR check location:       Preferred lab:       Send INR reminders to:    [facility]    Comments:         Anticoagulation Care Providers     Provider Role Specialty Phone number    Nidhi Juarez MD Referring Cardiology [phone]          INR History:  Anticoagulation Monitoring 3/16/2020 3/23/2020 3/30/2020   INR 3.30 3.80 2.80   INR Date 3/16/2020 3/23/2020 3/30/2020   INR Goal 2.0-3.0 2.0-3.0 2.0-3.0   Trend Down Down Same   Last Week Total 25 mg 23.75 mg 20 mg   Next Week Total 23.75 mg 20 mg 22.5 mg   Sun 3.75 mg 3.75 mg 3.75 mg   Mon 2.5 mg Hold (3/23) 2.5 mg   Tue 3.75 mg 3.75 mg 3.75 mg   Wed 3.75 mg 2.5 mg 2.5 mg   Thu 3.75 mg 3.75 mg 3.75 mg   Fri 2.5 mg 2.5 mg 2.5 mg   Sat 3.75 mg 3.75 mg 3.75 mg   Visit Report - - -   Some recent data might be hidden       Plan:  1. INR is Therapeutic today- see above in Anticoagulation Summary.   Provided instructions to Rosie with [facility] to Continue their warfarin regimen - 2.5 mg MWF, 3.75 mg rest of wk - see above in Anticoagulation Summary.  2. Follow up in 1 week      Killian Rowell RPH

## 2020-04-06 ENCOUNTER — ANTICOAGULATION VISIT (OUTPATIENT)
Dept: PHARMACY | Facility: HOSPITAL | Age: 85
End: 2020-04-06

## 2020-04-06 LAB — INR PPP: 2.9

## 2020-04-06 NOTE — PROGRESS NOTES
Anticoagulation Clinic Progress Note    Anticoagulation Summary  As of 2020    INR goal:   2.0-3.0   TTR:   68.5 % (2.5 y)   INR used for dosin.90 (2020)   Warfarin maintenance plan:   2.5 mg every Mon, Wed, Fri; 3.75 mg all other days   Weekly warfarin total:   22.5 mg   No change documented:   Killian Rowell RPH   Plan last modified:   Killian Rowell RPH (3/23/2020)   Next INR check:   2020   Priority:   Maintenance   Target end date:   Indefinite    Indications    Atrial fibrillation (CMS/HCC) [I48.91]             Anticoagulation Episode Summary     INR check location:       Preferred lab:       Send INR reminders to:    [facility]    Comments:         Anticoagulation Care Providers     Provider Role Specialty Phone number    Nidhi Juarez MD Referring Cardiology [phone]          INR History:  Anticoagulation Monitoring 3/23/2020 3/30/2020 2020   INR 3.80 2.80 2.90   INR Date 3/23/2020 3/30/2020 2020   INR Goal 2.0-3.0 2.0-3.0 2.0-3.0   Trend Down Same Same   Last Week Total 23.75 mg 20 mg 22.5 mg   Next Week Total 20 mg 22.5 mg 22.5 mg   Sun 3.75 mg 3.75 mg 3.75 mg   Mon Hold (3/23) 2.5 mg 2.5 mg   Tue 3.75 mg 3.75 mg 3.75 mg   Wed 2.5 mg 2.5 mg 2.5 mg   Thu 3.75 mg 3.75 mg 3.75 mg   Fri 2.5 mg 2.5 mg 2.5 mg   Sat 3.75 mg 3.75 mg 3.75 mg   Visit Report - - -   Some recent data might be hidden       Plan:  1. INR is Therapeutic today- see above in Anticoagulation Summary.   Provided instructions to Rosie with [facility] to Continue their warfarin regimen- see above in Anticoagulation Summary.  2. Follow up in 1 week      Killian Rowell RPH

## 2020-04-13 ENCOUNTER — ANTICOAGULATION VISIT (OUTPATIENT)
Dept: PHARMACY | Facility: HOSPITAL | Age: 85
End: 2020-04-13

## 2020-04-13 LAB — INR PPP: 4.7

## 2020-04-13 NOTE — PROGRESS NOTES
Anticoagulation Clinic Progress Note    Anticoagulation Summary  As of 2020    INR goal:   2.0-3.0   TTR:   68.0 % (2.5 y)   INR used for dosin.70! (2020)   Warfarin maintenance plan:   2.5 mg every Mon, Wed, Fri; 3.75 mg all other days   Weekly warfarin total:   22.5 mg   Plan last modified:   Killian Rowell RPH (3/23/2020)   Next INR check:   2020   Priority:   Maintenance   Target end date:   Indefinite    Indications    Atrial fibrillation (CMS/HCC) [I48.91]             Anticoagulation Episode Summary     INR check location:       Preferred lab:       [facility] [place]    Comments:         Anticoagulation Care Providers     Provider Role Specialty Phone number    Nidhi Juarez MD Referring Cardiology [phone]          INR History:  Anticoagulation Monitoring 3/30/2020 2020 2020   INR 2.80 2.90 4.70   INR Date 3/30/2020 2020 2020   INR Goal 2.0-3.0 2.0-3.0 2.0-3.0   Trend Same Same Same   Last Week Total 20 mg 22.5 mg 22.5 mg   Next Week Total 22.5 mg 22.5 mg 16.25 mg   Sun 3.75 mg 3.75 mg 3.75 mg   Mon 2.5 mg 2.5 mg Hold ()   Tue 3.75 mg 3.75 mg Hold ()   Wed 2.5 mg 2.5 mg 2.5 mg   Thu 3.75 mg 3.75 mg 3.75 mg   Fri 2.5 mg 2.5 mg 2.5 mg   Sat 3.75 mg 3.75 mg 3.75 mg   Visit Report - - -   Some recent data might be hidden       Plan:  1. INR is Supratherapeutic today- see above in Anticoagulation Summary.   Provided instructions to Margarita with [facility] to HOLD warfarin x 2 days, then continue their warfarin regimen- see above in Anticoagulation Summary.  2. Follow up in 1 week      Nani Rao RPH

## 2020-04-20 ENCOUNTER — ANTICOAGULATION VISIT (OUTPATIENT)
Dept: PHARMACY | Facility: HOSPITAL | Age: 85
End: 2020-04-20

## 2020-04-20 LAB — INR PPP: 3.1

## 2020-04-20 NOTE — PROGRESS NOTES
Anticoagulation Clinic Progress Note    Anticoagulation Summary  As of 4/20/2020    INR goal:   2.0-3.0   TTR:   67.5 % (2.5 y)   INR used for dosing:   3.10! (4/20/2020)   Warfarin maintenance plan:   3.75 mg every Sun, Tue, Thu; 2.5 mg all other days   Weekly warfarin total:   21.25 mg   Plan last modified:   Killian Rowell RPH (4/20/2020)   Next INR check:   4/27/2020   Priority:   Maintenance   Target end date:   Indefinite    Indications    Atrial fibrillation (CMS/HCC) [I48.91]             Anticoagulation Episode Summary     INR check location:       Preferred lab:       [facility]    Comments:         Anticoagulation Care Providers     Provider Role Specialty Phone number    Nidhi Juarez MD Referring Cardiology [phone]          INR History:  Anticoagulation Monitoring 4/6/2020 4/13/2020 4/20/2020   INR 2.90 4.70 3.10   INR Date 4/6/2020 4/13/2020 4/20/2020   INR Goal 2.0-3.0 2.0-3.0 2.0-3.0   Trend Same Same Down   Last Week Total 22.5 mg 22.5 mg 16.25 mg   Next Week Total 22.5 mg 16.25 mg 20 mg   Sun 3.75 mg 3.75 mg 3.75 mg   Mon 2.5 mg Hold (4/13) 1.25 mg (4/20)   Tue 3.75 mg Hold (4/14) 3.75 mg   Wed 2.5 mg 2.5 mg 2.5 mg   Thu 3.75 mg 3.75 mg 3.75 mg   Fri 2.5 mg 2.5 mg 2.5 mg   Sat 3.75 mg 3.75 mg 2.5 mg   Visit Report - - -   Some recent data might be hidden       Plan:  1. INR is Supratherapeutic today- see above in Anticoagulation Summary.   Provided instructions to Rosie with [facility] to Change their warfarin regimen- see above in Anticoagulation Summary.  2. Follow up in 1 week      Killian Rowell RPH

## 2020-04-23 RX ORDER — OMEPRAZOLE 20 MG/1
20 CAPSULE, DELAYED RELEASE ORAL
Qty: 90 CAPSULE | Refills: 3 | Status: SHIPPED | OUTPATIENT
Start: 2020-04-23

## 2020-04-27 ENCOUNTER — ANTICOAGULATION VISIT (OUTPATIENT)
Dept: PHARMACY | Facility: HOSPITAL | Age: 85
End: 2020-04-27

## 2020-04-27 LAB — INR PPP: 3.1

## 2020-04-27 NOTE — PROGRESS NOTES
Anticoagulation Clinic Progress Note    Anticoagulation Summary  As of 4/27/2020    INR goal:   2.0-3.0   TTR:   67.0 % (2.5 y)   INR used for dosing:   3.10! (4/27/2020)   Warfarin maintenance plan:   3.75 mg every Sun, Tue, Thu; 2.5 mg all other days   Weekly warfarin total:   21.25 mg   Plan last modified:   Killian Rowell RPH (4/20/2020)   Next INR check:   5/4/2020   Priority:   Maintenance   Target end date:   Indefinite    Indications    Atrial fibrillation (CMS/HCC) [I48.91]             Anticoagulation Episode Summary     INR check location:       Preferred lab:       [facility]    Comments:         Anticoagulation Care Providers     Provider Role Specialty Phone number    Nidhi Juarez MD Referring Cardiology [phone]          INR History:  Anticoagulation Monitoring 4/13/2020 4/20/2020 4/27/2020   INR 4.70 3.10 3.10   INR Date 4/13/2020 4/20/2020 4/27/2020   INR Goal 2.0-3.0 2.0-3.0 2.0-3.0   Trend Same Down Same   Last Week Total 22.5 mg 16.25 mg 20 mg   Next Week Total 16.25 mg 20 mg 21.25 mg   Sun 3.75 mg 3.75 mg 3.75 mg   Mon Hold (4/13) 1.25 mg (4/20) 2.5 mg   Tue Hold (4/14) 3.75 mg 3.75 mg   Wed 2.5 mg 2.5 mg 2.5 mg   Thu 3.75 mg 3.75 mg 3.75 mg   Fri 2.5 mg 2.5 mg 2.5 mg   Sat 3.75 mg 2.5 mg 2.5 mg   Visit Report - - -   Some recent data might be hidden       Plan:  1. INR is Supratherapeutic today- see above in Anticoagulation Summary.   Provided instructions to Rosie with [facility] to Change their warfarin regimen- see above in Anticoagulation Summary.  2. Follow up in 1 week   Nani Rao RPH

## 2020-04-28 DIAGNOSIS — I25.10 CORONARY ARTERY DISEASE INVOLVING NATIVE CORONARY ARTERY OF NATIVE HEART WITHOUT ANGINA PECTORIS: ICD-10-CM

## 2020-04-28 DIAGNOSIS — E78.00 PURE HYPERCHOLESTEROLEMIA: ICD-10-CM

## 2020-04-28 RX ORDER — ATORVASTATIN CALCIUM 40 MG/1
TABLET, FILM COATED ORAL
Qty: 90 TABLET | Refills: 0 | OUTPATIENT
Start: 2020-04-28

## 2020-04-30 ENCOUNTER — TELEPHONE (OUTPATIENT)
Dept: CARDIOLOGY | Facility: CLINIC | Age: 85
End: 2020-04-30

## 2020-04-30 NOTE — TELEPHONE ENCOUNTER
Requesting refill: Metoprolol 25 MG. 90 day    Pharmacy: [facility] Myara Handley

## 2020-05-04 ENCOUNTER — ANTICOAGULATION VISIT (OUTPATIENT)
Dept: PHARMACY | Facility: HOSPITAL | Age: 85
End: 2020-05-04

## 2020-05-04 DIAGNOSIS — E78.00 PURE HYPERCHOLESTEROLEMIA: ICD-10-CM

## 2020-05-04 DIAGNOSIS — I25.10 CORONARY ARTERY DISEASE INVOLVING NATIVE CORONARY ARTERY OF NATIVE HEART WITHOUT ANGINA PECTORIS: ICD-10-CM

## 2020-05-04 LAB — INR PPP: 2.1

## 2020-05-04 RX ORDER — ATORVASTATIN CALCIUM 40 MG/1
TABLET, FILM COATED ORAL
Qty: 90 TABLET | Refills: 0 | OUTPATIENT
Start: 2020-05-04

## 2020-05-04 RX ORDER — ATORVASTATIN CALCIUM 40 MG/1
40 TABLET, FILM COATED ORAL DAILY
Qty: 90 TABLET | Refills: 0 | Status: SHIPPED | OUTPATIENT
Start: 2020-05-04 | End: 2020-06-30 | Stop reason: SDUPTHER

## 2020-05-04 RX ORDER — POTASSIUM CHLORIDE 750 MG/1
10 TABLET, EXTENDED RELEASE ORAL DAILY
Qty: 90 TABLET | Refills: 1 | Status: SHIPPED | OUTPATIENT
Start: 2020-05-04

## 2020-05-04 NOTE — PROGRESS NOTES
Anticoagulation Clinic Progress Note    Anticoagulation Summary  As of 2020    INR goal:   2.0-3.0   TTR:   67.2 % (2.5 y)   INR used for dosin.10 (2020)   Warfarin maintenance plan:   3.75 mg every Sun, Thu; 2.5 mg all other days   Weekly warfarin total:   20 mg   Plan last modified:   Killian Rowell RPH (2020)   Next INR check:   2020   Priority:   Maintenance   Target end date:   Indefinite    Indications    Atrial fibrillation (CMS/HCC) [I48.91]             Anticoagulation Episode Summary     INR check location:       Preferred lab:       Send INR reminders to:    [facility]    Comments:         Anticoagulation Care Providers     Provider Role Specialty Phone number    Nidhi Juarez MD Referring Cardiology [phone]        Pertinent info:  Missed dose Fri. Took 3.75 mg Sun/Thurs as instructed at last encounter.    INR History:  Anticoagulation Monitoring 2020   INR 3.10 3.10 2.10   INR Date 2020   INR Goal 2.0-3.0 2.0-3.0 2.0-3.0   Trend Down Same Down   Last Week Total 16.25 mg 20 mg 17.5 mg   Next Week Total 20 mg 21.25 mg 20 mg   Sun 3.75 mg 3.75 mg 3.75 mg   Mon 1.25 mg () 2.5 mg 2.5 mg   Tue 3.75 mg 3.75 mg 2.5 mg   Wed 2.5 mg 2.5 mg 2.5 mg   Thu 3.75 mg 3.75 mg 3.75 mg   Fri 2.5 mg 2.5 mg 2.5 mg   Sat 2.5 mg 2.5 mg 2.5 mg   Visit Report - - -   Some recent data might be hidden       Plan:  1. INR is Therapeutic today- see above in Anticoagulation Summary.   Provided instructions to Rosie with [facility] to Continue their warfarin regimen of 3.75 mg Sun/Thurs, 2.5 mg rest of wk - see above in Anticoagulation Summary.  2. Follow up in 1 week      Killian Rowell RPH

## 2020-05-08 RX ORDER — WARFARIN SODIUM 2.5 MG/1
TABLET ORAL
Qty: 105 TABLET | Refills: 0 | Status: SHIPPED | OUTPATIENT
Start: 2020-05-08 | End: 2020-07-09

## 2020-05-11 ENCOUNTER — ANTICOAGULATION VISIT (OUTPATIENT)
Dept: PHARMACY | Facility: HOSPITAL | Age: 85
End: 2020-05-11

## 2020-05-11 LAB — INR PPP: 3

## 2020-05-11 NOTE — PROGRESS NOTES
Anticoagulation Clinic Progress Note    Anticoagulation Summary  As of 5/11/2020    INR goal:   2.0-3.0   TTR:   67.4 % (2.6 y)   INR used for dosing:   3.00 (5/11/2020)   Warfarin maintenance plan:   3.75 mg every Sun; 2.5 mg all other days   Weekly warfarin total:   18.75 mg   Plan last modified:   Killian Rowell RPH (5/11/2020)   Next INR check:   5/18/2020   Priority:   Maintenance   Target end date:   Indefinite    Indications    Atrial fibrillation (CMS/HCC) [I48.91]             Anticoagulation Episode Summary     INR check location:       Preferred lab:       [facility]    Comments:         Anticoagulation Care Providers     Provider Role Specialty Phone number    Nidhi Juarez MD Referring Cardiology [phone]          INR History:  Anticoagulation Monitoring 4/27/2020 5/4/2020 5/11/2020   INR 3.10 2.10 3.00   INR Date 4/27/2020 5/4/2020 5/11/2020   INR Goal 2.0-3.0 2.0-3.0 2.0-3.0   Trend Same Down Down   Last Week Total 20 mg 17.5 mg 20 mg   Next Week Total 21.25 mg 20 mg 18.75 mg   Sun 3.75 mg 3.75 mg 3.75 mg   Mon 2.5 mg 2.5 mg 2.5 mg   Tue 3.75 mg 2.5 mg 2.5 mg   Wed 2.5 mg 2.5 mg 2.5 mg   Thu 3.75 mg 3.75 mg 2.5 mg   Fri 2.5 mg 2.5 mg 2.5 mg   Sat 2.5 mg 2.5 mg 2.5 mg   Visit Report - - -   Some recent data might be hidden       Plan:  1. INR is Therapeutic today- see above in Anticoagulation Summary.   Provided instructions to Rosie with [facility] to Change their warfarin regimen- see above in Anticoagulation Summary.  2. Follow up in 1 week      Killian Rowell RPH

## 2020-05-18 ENCOUNTER — ANTICOAGULATION VISIT (OUTPATIENT)
Dept: PHARMACY | Facility: HOSPITAL | Age: 85
End: 2020-05-18

## 2020-05-18 LAB — INR PPP: 1.8

## 2020-05-18 NOTE — PROGRESS NOTES
Anticoagulation Clinic Progress Note    Anticoagulation Summary  As of 2020    INR goal:   2.0-3.0   TTR:   67.5 % (2.6 y)   INR used for dosin.80! (2020)   Warfarin maintenance plan:   3.75 mg every Sun; 2.5 mg all other days   Weekly warfarin total:   18.75 mg   Plan last modified:   Killian Rowell RPH (2020)   Next INR check:   2020   Priority:   Maintenance   Target end date:   Indefinite    Indications    Atrial fibrillation (CMS/HCC) [I48.91]             Anticoagulation Episode Summary     INR check location:       Preferred lab:       Send INR reminders to:    [facility]    Comments:         Anticoagulation Care Providers     Provider Role Specialty Phone number    Nidhi Juarez MD Referring Cardiology [phone]          INR History:  Anticoagulation Monitoring 2020   INR 2.10 3.00 1.80   INR Date 2020   INR Goal 2.0-3.0 2.0-3.0 2.0-3.0   Trend Down Down Same   Last Week Total 17.5 mg 20 mg 18.75 mg   Next Week Total 20 mg 18.75 mg 20 mg   Sun 3.75 mg 3.75 mg 3.75 mg   Mon 2.5 mg 2.5 mg 3.75 mg (); Otherwise 2.5 mg   Tue 2.5 mg 2.5 mg 2.5 mg   Wed 2.5 mg 2.5 mg 2.5 mg   Thu 3.75 mg 2.5 mg 2.5 mg   Fri 2.5 mg 2.5 mg 2.5 mg   Sat 2.5 mg 2.5 mg 2.5 mg   Visit Report - - -   Some recent data might be hidden       Plan:  1. INR is Subtherapeutic today- see above in Anticoagulation Summary.   Provided instructions to Rosie with [facility] to Change their warfarin regimen- see above in Anticoagulation Summary.  2. Follow up in 1 week      Killian Rowell RPH

## 2020-05-26 ENCOUNTER — ANTICOAGULATION VISIT (OUTPATIENT)
Dept: PHARMACY | Facility: HOSPITAL | Age: 85
End: 2020-05-26

## 2020-05-26 LAB — INR PPP: 1.8

## 2020-05-26 NOTE — PROGRESS NOTES
Anticoagulation Clinic Progress Note    Anticoagulation Summary  As of 2020    INR goal:   2.0-3.0   TTR:   67.0 % (2.6 y)   INR used for dosin.80! (2020)   Warfarin maintenance plan:   3.75 mg every Sun, Thu; 2.5 mg all other days   Weekly warfarin total:   20 mg   Plan last modified:   Killian Rowell RPH (2020)   Next INR check:   2020   Priority:   Maintenance   Target end date:   Indefinite    Indications    Atrial fibrillation (CMS/HCC) [I48.91]             Anticoagulation Episode Summary     INR check location:       Preferred lab:       Send INR reminders to:    [facility]    Comments:         Anticoagulation Care Providers     Provider Role Specialty Phone number    Nidhi Juarez MD Referring Cardiology [phone]          INR History:  Anticoagulation Monitoring 2020   INR 3.00 1.80 1.80   INR Date 2020   INR Goal 2.0-3.0 2.0-3.0 2.0-3.0   Trend Down Same Up   Last Week Total 20 mg 18.75 mg 18.75 mg   Next Week Total 18.75 mg 20 mg 20 mg   Sun 3.75 mg 3.75 mg 3.75 mg   Mon 2.5 mg 3.75 mg (); Otherwise 2.5 mg -   Tue 2.5 mg 2.5 mg 2.5 mg   Wed 2.5 mg 2.5 mg 2.5 mg   Thu 2.5 mg 2.5 mg 3.75 mg   Fri 2.5 mg 2.5 mg 2.5 mg   Sat 2.5 mg 2.5 mg 2.5 mg   Visit Report - - -   Some recent data might be hidden       Plan:  1. INR is Subtherapeutic today- see above in Anticoagulation Summary.   Provided instructions to Rosie with [facility] to Increase their warfarin regimen- see above in Anticoagulation Summary.  2. Follow up in 1 week      Killian Rowell RPH

## 2020-06-01 ENCOUNTER — ANTICOAGULATION VISIT (OUTPATIENT)
Dept: PHARMACY | Facility: HOSPITAL | Age: 85
End: 2020-06-01

## 2020-06-01 LAB — INR PPP: 1.5

## 2020-06-01 NOTE — PROGRESS NOTES
Anticoagulation Clinic Progress Note    Anticoagulation Summary  As of 2020    INR goal:   2.0-3.0   TTR:   66.5 % (2.6 y)   INR used for dosin.50! (2020)   Warfarin maintenance plan:   3.75 mg every Sun, Tue, Thu; 2.5 mg all other days   Weekly warfarin total:   21.25 mg   Plan last modified:   Killian Rowell RPH (2020)   Next INR check:   2020   Priority:   Maintenance   Target end date:   Indefinite    Indications    Atrial fibrillation (CMS/HCC) [I48.91]             Anticoagulation Episode Summary     INR check location:       Preferred lab:       [facility]    Comments:         Anticoagulation Care Providers     Provider Role Specialty Phone number    Nidhi Juarez MD Referring Cardiology [phone]        Pertinent info:  Big salad a couple days ago, but usually has a couple salads/wk.     INR History:  Anticoagulation Monitoring 2020   INR 1.80 1.80 1.50   INR Date 2020   INR Goal 2.0-3.0 2.0-3.0 2.0-3.0   Trend Same Up Up   Last Week Total 18.75 mg 18.75 mg 20 mg   Next Week Total 20 mg 20 mg 22.5 mg   Sun 3.75 mg 3.75 mg 3.75 mg   Mon 3.75 mg (); Otherwise 2.5 mg - 3.75 mg ()   Tue 2.5 mg 2.5 mg 3.75 mg   Wed 2.5 mg 2.5 mg 2.5 mg   Thu 2.5 mg 3.75 mg 3.75 mg   Fri 2.5 mg 2.5 mg 2.5 mg   Sat 2.5 mg 2.5 mg 2.5 mg   Visit Report - - -   Some recent data might be hidden       Plan:  1. INR is Subtherapeutic today- see above in Anticoagulation Summary.   Provided instructions to Rosie with [facility] to Increase their warfarin regimen- see above in Anticoagulation Summary.  2. Follow up in 1 week      Killian Rowell RPH

## 2020-06-08 ENCOUNTER — ANTICOAGULATION VISIT (OUTPATIENT)
Dept: PHARMACY | Facility: HOSPITAL | Age: 85
End: 2020-06-08

## 2020-06-08 LAB — INR PPP: 1.8

## 2020-06-08 NOTE — PROGRESS NOTES
Anticoagulation Clinic Progress Note    Anticoagulation Summary  As of 2020    INR goal:   2.0-3.0   TTR:   66.1 % (2.6 y)   INR used for dosin.80! (2020)   Warfarin maintenance plan:   3.75 mg every Sun, Tue, Thu; 2.5 mg all other days   Weekly warfarin total:   21.25 mg   Plan last modified:   Killian Rowell RPH (2020)   Next INR check:   6/15/2020   Priority:   Maintenance   Target end date:   Indefinite    Indications    Atrial fibrillation (CMS/HCC) [I48.91]             Anticoagulation Episode Summary     INR check location:       Preferred lab:       [facility]    [facility] [place]    Comments:         Anticoagulation Care Providers     Provider Role Specialty Phone number    Nidhi Juarez MD Referring Cardiology [phone]          INR History:  Anticoagulation Monitoring 2020   INR 1.80 1.50 1.80   INR Date 2020   INR Goal 2.0-3.0 2.0-3.0 2.0-3.0   Trend Up Up Same   Last Week Total 18.75 mg 20 mg 22.5 mg   Next Week Total 20 mg 22.5 mg 22.5 mg   Sun 3.75 mg 3.75 mg 3.75 mg   Mon - 3.75 mg () 3.75 mg ()   Tue 2.5 mg 3.75 mg 3.75 mg   Wed 2.5 mg 2.5 mg 2.5 mg   Thu 3.75 mg 3.75 mg 3.75 mg   Fri 2.5 mg 2.5 mg 2.5 mg   Sat 2.5 mg 2.5 mg 2.5 mg   Visit Report - - -   Some recent data might be hidden       Plan:  1. INR is Subtherapeutic today and patient has missed doses recentl7- see above in Anticoagulation Summary.   Provided instructions to Rosie with [facility] to boost warfarin to 3.75mg today, then continue their warfarin regimen- see above in Anticoagulation Summary. Patient is moving dose time to AM to improve compliance.  2. Follow up in 1 week      Nani Rao RPH

## 2020-06-16 ENCOUNTER — ANTICOAGULATION VISIT (OUTPATIENT)
Dept: PHARMACY | Facility: HOSPITAL | Age: 85
End: 2020-06-16

## 2020-06-16 LAB — INR PPP: 1.3

## 2020-06-16 NOTE — PROGRESS NOTES
"Anticoagulation Clinic Progress Note    Anticoagulation Summary  As of 2020    INR goal:   2.0-3.0   TTR:   65.5 % (2.7 y)   INR used for dosin.30! (2020)   Warfarin maintenance plan:   3.75 mg every Sun, Tue, Thu; 2.5 mg all other days   Weekly warfarin total:   21.25 mg   Plan last modified:   Killian Rowell RPH (2020)   Next INR check:   2020   Priority:   Maintenance   Target end date:   Indefinite    Indications    Atrial fibrillation (CMS/HCC) [I48.91]             Anticoagulation Episode Summary     INR check location:       Preferred lab:       Send INR reminders to:    [facility]    Comments:         Anticoagulation Care Providers     Provider Role Specialty Phone number    Nidhi Juarez MD Referring Cardiology [phone]        Pertinent info:  \"Missed a couple days\" in past wk per pt (nurse reports pt is a poor historian).     INR History:  Anticoagulation Monitoring 2020   INR 1.50 1.80 1.30   INR Date 2020   INR Goal 2.0-3.0 2.0-3.0 2.0-3.0   Trend Up Same Same   Last Week Total 20 mg 22.5 mg 21.25 mg   Next Week Total 22.5 mg 22.5 mg 23.75 mg   Sun 3.75 mg 3.75 mg 3.75 mg   Mon 3.75 mg () 3.75 mg () 2.5 mg   Tue 3.75 mg 3.75 mg 5 mg ()   Wed 2.5 mg 2.5 mg 3.75 mg ()   Thu 3.75 mg 3.75 mg 3.75 mg   Fri 2.5 mg 2.5 mg 2.5 mg   Sat 2.5 mg 2.5 mg 2.5 mg   Visit Report - - -   Some recent data might be hidden       Plan:  1. INR is Subtherapeutic today- see above in Anticoagulation Summary.   Provided instructions to Pilar with [facility] to Change their warfarin regimen- see above in Anticoagulation Summary.  2. Follow up in 1 week      Killian Rowell RPH  "

## 2020-06-22 ENCOUNTER — ANTICOAGULATION VISIT (OUTPATIENT)
Dept: PHARMACY | Facility: HOSPITAL | Age: 85
End: 2020-06-22

## 2020-06-22 LAB — INR PPP: 2.3

## 2020-06-22 NOTE — PROGRESS NOTES
Anticoagulation Clinic Progress Note    Anticoagulation Summary  As of 2020    INR goal:   2.0-3.0   TTR:   65.3 % (2.7 y)   INR used for dosin.30 (2020)   Warfarin maintenance plan:   3.75 mg every Sun, Tue, Thu; 2.5 mg all other days   Weekly warfarin total:   21.25 mg   No change documented:   Killian Rowell RPH   Plan last modified:   Killian Rowell RPH (2020)   Next INR check:   2020   Priority:   Maintenance   Target end date:   Indefinite    Indications    Atrial fibrillation (CMS/HCC) [I48.91]             Anticoagulation Episode Summary     INR check location:       Preferred lab:       Send INR reminders to:    [facility]    Comments:         Anticoagulation Care Providers     Provider Role Specialty Phone number    Nidhi Juarez MD Referring Cardiology [phone]        Pertinent info:  Missed dose .    INR History:  Anticoagulation Monitoring 2020   INR 1.80 1.30 2.30   INR Date 2020   INR Goal 2.0-3.0 2.0-3.0 2.0-3.0   Trend Same Same Same   Last Week Total 22.5 mg 21.25 mg 20 mg   Next Week Total 22.5 mg 23.75 mg 21.25 mg   Sun 3.75 mg 3.75 mg 3.75 mg   Mon 3.75 mg () 2.5 mg 2.5 mg   Tue 3.75 mg 5 mg () 3.75 mg   Wed 2.5 mg 3.75 mg () 2.5 mg   Thu 3.75 mg 3.75 mg 3.75 mg   Fri 2.5 mg 2.5 mg 2.5 mg   Sat 2.5 mg 2.5 mg 2.5 mg   Visit Report - - -   Some recent data might be hidden       Plan:  1. INR is Therapeutic today- see above in Anticoagulation Summary.   Provided instructions to Rosie with [facility] to Continue their warfarin regimen- see above in Anticoagulation Summary.  2. Follow up in 1 week      Killian Rowell RPH

## 2020-06-23 ENCOUNTER — TRANSCRIBE ORDERS (OUTPATIENT)
Dept: ADMINISTRATIVE | Facility: HOSPITAL | Age: 85
End: 2020-06-23

## 2020-06-23 DIAGNOSIS — J90 PLEURAL EFFUSION, LEFT: Primary | ICD-10-CM

## 2020-06-24 ENCOUNTER — LAB (OUTPATIENT)
Dept: LAB | Facility: HOSPITAL | Age: 85
End: 2020-06-24

## 2020-06-24 ENCOUNTER — TRANSCRIBE ORDERS (OUTPATIENT)
Dept: SLEEP MEDICINE | Facility: HOSPITAL | Age: 85
End: 2020-06-24

## 2020-06-24 DIAGNOSIS — Z01.818 OTHER SPECIFIED PRE-OPERATIVE EXAMINATION: Primary | ICD-10-CM

## 2020-06-24 DIAGNOSIS — Z01.818 OTHER SPECIFIED PRE-OPERATIVE EXAMINATION: ICD-10-CM

## 2020-06-24 PROCEDURE — U0004 COV-19 TEST NON-CDC HGH THRU: HCPCS

## 2020-06-25 LAB
REF LAB TEST METHOD: NORMAL
SARS-COV-2 RNA RESP QL NAA+PROBE: NOT DETECTED

## 2020-06-26 ENCOUNTER — HOSPITAL ENCOUNTER (OUTPATIENT)
Dept: ULTRASOUND IMAGING | Facility: HOSPITAL | Age: 85
Discharge: HOME | End: 2020-06-26
Admitting: INTERNAL MEDICINE

## 2020-06-26 VITALS
HEIGHT: 67 IN | BODY MASS INDEX: 20.72 KG/M2 | OXYGEN SATURATION: 91 % | TEMPERATURE: 97.1 F | RESPIRATION RATE: 20 BRPM | HEART RATE: 75 BPM | SYSTOLIC BLOOD PRESSURE: 121 MMHG | WEIGHT: 132 LBS | DIASTOLIC BLOOD PRESSURE: 67 MMHG

## 2020-06-26 DIAGNOSIS — J90 PLEURAL EFFUSION, LEFT: ICD-10-CM

## 2020-06-26 LAB
INR PPP: 1.2 (ref 0.8–1.2)
PROTHROMBIN TIME: 14.6 SECONDS (ref 12.8–15.2)

## 2020-06-26 PROCEDURE — 76942 ECHO GUIDE FOR BIOPSY: CPT

## 2020-06-26 PROCEDURE — 85610 PROTHROMBIN TIME: CPT

## 2020-06-26 PROCEDURE — 25010000003 LIDOCAINE 1 % SOLUTION: Performed by: RADIOLOGY

## 2020-06-26 RX ORDER — LIDOCAINE HYDROCHLORIDE 10 MG/ML
20 INJECTION, SOLUTION INFILTRATION; PERINEURAL ONCE
Status: COMPLETED | OUTPATIENT
Start: 2020-06-26 | End: 2020-06-26

## 2020-06-26 RX ADMIN — LIDOCAINE HYDROCHLORIDE 15 ML: 10 INJECTION, SOLUTION INFILTRATION; PERINEURAL at 08:26

## 2020-06-26 NOTE — H&P
On admission pt c/o sacral pain.  Pt has stage 2 decub on sacrum according to [facility] skin assessment tool.  Placed Optiform over area.  Will contact pt doctor to have home health assessment, is already got them coming once weekly to check PT/INR as is on coumadin.

## 2020-06-26 NOTE — DISCHARGE INSTRUCTIONS
EDUCATION /DISCHARGE INSTRUCTIONS Thoracentesis:  A thoracentesis is a procedure to remove fluid or air from the space between the lung and it's lining.  It is done to relieve lung compression and respiratory distress.  A sample can also be obtained to aid diagnosis.   Medications can be administered into the space.      During the procedure:  You will be seated comfortable leaning forward onto a pillow supported by a table.  The area will be cleaned with antiseptic soap and draped with a sterile towel.  The physician will administer a local antiseptic to numb the area.  Next, the physician will insert a needle with tubing attached into the space between the lung and lining.  Fluid is removed and a sample sent to the laboratory.   When completed a pressure dressing is applied to the site.    Risks of the procedure include but are not limited to:   *  Bleeding    *  Low blood pressure *  Infection     *  Lung collapse possibly requiring insertion of a tube to re-inflate the lung.    Benefits of the procedure:  Relief of respiratory distress and facilitation of a diagnosis.  Alternatives to the procedure:  Drug therapy with diuretics to remove fluid.  Risks of diuretic drug therapy include possible dehydration and renal failure.  The benefit of drug therapy is that it can be done at home under physician supervision.  THIS EDUCATION INFORMATION WAS REVIEWED PRIOR TO THE PROCEDURE AND CONSENT. Patient initials___________________Time__________________    Post Procedure:    *  Rest today (no pushing pulling, straining or heavy lifting).   *  Slowly increase activity tomorow.    *  If you received sedation do not drive for 24 hours.   *  Keep dressing clean and dry.   *  Leave dressing on puncture site for 24 hours.    *  You may shower when dressing removed.   *  Expect some coughing as the lung re-expands.    Call your doctor if experiencing:   *  If experiencing sudden / severe shortness of breath, chest pain or if  coughing       up blood go to the nearest emergency room.   *  Signs of infection such as redness, swelling, excessive pain and / or foul       smelling drainage from the puncture site.   *  Chills or fever over 101 degrees (by mouth).   *  Change in sputum color (yellow, green, red).   *  Unrelieved pain.   *  Any new or severe symptoms.  Following the procedure:     Follow-up with the ordering physician as directed.    Continue to take other medications as directed by your physician unless    otherwise instructed.   If applicable, resume taking your blood thinners or Aspirin on ___________.    If you have any concerns please call the Radiology Nurses Desk at [phone].  You are the most important factor in your recovery.  Follow the above instructions carefully.

## 2020-06-26 NOTE — NURSING NOTE
Sofie Prater, micro lab, she stated we have no orders for testing on the thoracentesis fluid. I spoke to Dr. Grace now on the telephone and he stated that patient has CHF, no lab testing is needed on this fluid.  I phoned Sofie back and told her this.  She stated understanding.

## 2020-06-26 NOTE — NURSING NOTE
Spoke with Dr Cortez's office about getting [facility] to follow pt for wound care, they said he has not been seen in over a year and needs to make apt before they can do this.  Relayed this info to pt and his son Jae, son will follow up and make sure pt gets seen.  Pt dc'd to car via w/c

## 2020-06-27 ENCOUNTER — TELEPHONE (OUTPATIENT)
Dept: INTERVENTIONAL RADIOLOGY/VASCULAR | Facility: HOSPITAL | Age: 85
End: 2020-06-27

## 2020-06-29 ENCOUNTER — ANTICOAGULATION VISIT (OUTPATIENT)
Dept: PHARMACY | Facility: HOSPITAL | Age: 85
End: 2020-06-29

## 2020-06-29 LAB — INR PPP: 1.3

## 2020-06-29 NOTE — PROGRESS NOTES
Anticoagulation Clinic Progress Note    Anticoagulation Summary  As of 2020    INR goal:   2.0-3.0   TTR:   64.9 % (2.7 y)   INR used for dosin.30! (2020)   Warfarin maintenance plan:   3.75 mg every Sun, Tue, Thu; 2.5 mg all other days   Weekly warfarin total:   21.25 mg   Plan last modified:   Killian Rowell RPH (2020)   Next INR check:   2020   Priority:   Maintenance   Target end date:   Indefinite    Indications    Atrial fibrillation (CMS/HCC) [I48.91]             Anticoagulation Episode Summary     INR check location:       Preferred lab:       [facility] [place]    Comments:         Anticoagulation Care Providers     Provider Role Specialty Phone number    Nidhi Juarez MD Referring Cardiology [phone]          INR History:  Anticoagulation Monitoring 2020   INR 1.30 2.30 1.30   INR Date 2020   INR Goal 2.0-3.0 2.0-3.0 2.0-3.0   Trend Same Same Same   Last Week Total 21.25 mg 20 mg 8.75 mg   Next Week Total 23.75 mg 21.25 mg 23.75 mg   Sun 3.75 mg 3.75 mg 3.75 mg   Mon 2.5 mg 2.5 mg 5 mg ()   Tue 5 mg () 3.75 mg 3.75 mg   Wed 3.75 mg () 2.5 mg 2.5 mg   Thu 3.75 mg 3.75 mg 3.75 mg   Fri 2.5 mg 2.5 mg 2.5 mg   Sat 2.5 mg 2.5 mg 2.5 mg   Visit Report - - -   Some recent data might be hidden       Plan:  1. INR is Subtherapeutic today- see above in Anticoagulation Summary.   Provided instructions to Rosie with [facility] to boost warfarin to 5mg today only, then continue their warfarin regimen- see above in Anticoagulation Summary.  2. Follow up in 1 week      Nani Rao RPH

## 2020-06-30 DIAGNOSIS — I25.10 CORONARY ARTERY DISEASE INVOLVING NATIVE CORONARY ARTERY OF NATIVE HEART WITHOUT ANGINA PECTORIS: ICD-10-CM

## 2020-06-30 DIAGNOSIS — E78.00 PURE HYPERCHOLESTEROLEMIA: ICD-10-CM

## 2020-06-30 RX ORDER — ATORVASTATIN CALCIUM 40 MG/1
40 TABLET, FILM COATED ORAL DAILY
Qty: 90 TABLET | Refills: 3 | Status: SHIPPED | OUTPATIENT
Start: 2020-06-30

## 2020-07-06 ENCOUNTER — ANTICOAGULATION VISIT (OUTPATIENT)
Dept: PHARMACY | Facility: HOSPITAL | Age: 85
End: 2020-07-06

## 2020-07-06 LAB — INR PPP: 1.3

## 2020-07-06 NOTE — PROGRESS NOTES
Anticoagulation Clinic Progress Note    Anticoagulation Summary  As of 2020    INR goal:   2.0-3.0   TTR:   64.5 % (2.7 y)   INR used for dosin.30! (2020)   Warfarin maintenance plan:   3.75 mg every Sun, Tue, Thu; 2.5 mg all other days   Weekly warfarin total:   21.25 mg   Plan last modified:   Killian Rowell RPH (2020)   Next INR check:   2020   Priority:   Maintenance   Target end date:   Indefinite    Indications    Atrial fibrillation (CMS/HCC) [I48.91]             Anticoagulation Episode Summary     INR check location:       Preferred lab:       [facility]    [facility] [place]    Comments:         Anticoagulation Care Providers     Provider Role Specialty Phone number    Nidhi Juarez MD Referring Cardiology [phone]          INR History:  Anticoagulation Monitoring 2020   INR 2.30 1.30 1.30   INR Date 2020   INR Goal 2.0-3.0 2.0-3.0 2.0-3.0   Trend Same Same Same   Last Week Total 20 mg 8.75 mg 17.5 mg   Next Week Total 21.25 mg 23.75 mg 25 mg   Sun 3.75 mg 3.75 mg 3.75 mg   Mon 2.5 mg 5 mg () 5 mg ()   Tue 3.75 mg 3.75 mg 5 mg ()   Wed 2.5 mg 2.5 mg 2.5 mg   Thu 3.75 mg 3.75 mg 3.75 mg   Fri 2.5 mg 2.5 mg 2.5 mg   Sat 2.5 mg 2.5 mg 2.5 mg   Visit Report - - -   Some recent data might be hidden       Plan:  1. INR is Subtherapeutic today- see above in Anticoagulation Summary.   Provided instructions to Rosie with [facility] to boost warfarin to 5mg x 2 days, then continue their warfarin regimen- see above in Anticoagulation Summary.  2. Follow up in 1 week      Nani Rao RPH

## 2020-07-09 RX ORDER — WARFARIN SODIUM 2.5 MG/1
TABLET ORAL
Qty: 105 TABLET | Refills: 0 | Status: SHIPPED | OUTPATIENT
Start: 2020-07-09

## 2020-07-13 ENCOUNTER — ANTICOAGULATION VISIT (OUTPATIENT)
Dept: PHARMACY | Facility: HOSPITAL | Age: 85
End: 2020-07-13

## 2020-07-13 LAB — INR PPP: 2.7

## 2020-07-13 NOTE — PROGRESS NOTES
Anticoagulation Clinic Progress Note    Anticoagulation Summary  As of 2020    INR goal:   2.0-3.0   TTR:   64.4 % (2.7 y)   INR used for dosin.70 (2020)   Warfarin maintenance plan:   3.75 mg every Sun, Tue, Thu; 2.5 mg all other days   Weekly warfarin total:   21.25 mg   Plan last modified:   Killian Rowell RPH (2020)   Next INR check:   2020   Priority:   Maintenance   Target end date:   Indefinite    Indications    Atrial fibrillation (CMS/HCC) [I48.91]             Anticoagulation Episode Summary     INR check location:       Preferred lab:       Send INR reminders to:   [facility] CLINICAL [place]    Comments:         Anticoagulation Care Providers     Provider Role Specialty Phone number    Nidhi Juarez MD Referring Cardiology [phone]          INR History:  Anticoagulation Monitoring 2020   INR 1.30 1.30 2.70   INR Date 2020   INR Goal 2.0-3.0 2.0-3.0 2.0-3.0   Trend Same Same Same   Last Week Total 8.75 mg 17.5 mg 25 mg   Next Week Total 23.75 mg 25 mg 21.25 mg   Sun 3.75 mg 3.75 mg 3.75 mg   Mon 5 mg () 5 mg () 2.5 mg   Tue 3.75 mg 5 mg () 3.75 mg   Wed 2.5 mg 2.5 mg 2.5 mg   Thu 3.75 mg 3.75 mg 3.75 mg   Fri 2.5 mg 2.5 mg 2.5 mg   Sat 2.5 mg 2.5 mg 2.5 mg   Visit Report - - -   Some recent data might be hidden       Plan:  1. INR is Therapeutic today- see above in Anticoagulation Summary.   Provided instructions to Rosie with [facility] to Continue their warfarin regimen- see above in Anticoagulation Summary.  2. Follow up in 1 week      Nani Rao RPH

## 2020-07-20 ENCOUNTER — ANTICOAGULATION VISIT (OUTPATIENT)
Dept: PHARMACY | Facility: HOSPITAL | Age: 85
End: 2020-07-20

## 2020-07-20 LAB — INR PPP: 1.8

## 2020-07-20 NOTE — PROGRESS NOTES
Anticoagulation Clinic Progress Note    Anticoagulation Summary  As of 2020    INR goal:   2.0-3.0   TTR:   64.5 % (2.7 y)   INR used for dosin.80! (2020)   Warfarin maintenance plan:   3.75 mg every Sun, Tue, Thu; 2.5 mg all other days   Weekly warfarin total:   21.25 mg   Plan last modified:   Killian Rowell RPH (2020)   Next INR check:   2020   Priority:   Maintenance   Target end date:   Indefinite    Indications    Atrial fibrillation (CMS/HCC) [I48.91]             Anticoagulation Episode Summary     INR check location:       Preferred lab:       [facility]    Comments:         Anticoagulation Care Providers     Provider Role Specialty Phone number    Nidhi Juarez MD Referring Cardiology [phone]        Pertinent info:  Nurse reports pt missed 1-2 doses last week (1 dose with certainty).     INR History:  Anticoagulation Monitoring 2020   INR 1.30 2.70 1.80   INR Date 2020   INR Goal 2.0-3.0 2.0-3.0 2.0-3.0   Trend Same Same Same   Last Week Total 17.5 mg 25 mg 18.75 mg   Next Week Total 25 mg 21.25 mg 22.5 mg   Sun 3.75 mg 3.75 mg 3.75 mg   Mon 5 mg () 2.5 mg 3.75 mg ()   Tue 5 mg () 3.75 mg 3.75 mg   Wed 2.5 mg 2.5 mg 2.5 mg   Thu 3.75 mg 3.75 mg 3.75 mg   Fri 2.5 mg 2.5 mg 2.5 mg   Sat 2.5 mg 2.5 mg 2.5 mg   Visit Report - - -   Some recent data might be hidden       Plan:  1. INR is Subtherapeutic today- see above in Anticoagulation Summary.   Provided instructions via [employer] to Rosie with [facility] to Change their warfarin regimen- see above in Anticoagulation Summary.  2. Follow up in 1 week      Killian Rowell RPH

## 2020-07-27 ENCOUNTER — ANTICOAGULATION VISIT (OUTPATIENT)
Dept: PHARMACY | Facility: HOSPITAL | Age: 85
End: 2020-07-27

## 2020-07-27 LAB — INR PPP: 1.5

## 2020-07-27 NOTE — PROGRESS NOTES
Anticoagulation Clinic Progress Note    Anticoagulation Summary  As of 2020    INR goal:   2.0-3.0   TTR:   64.0 % (2.8 y)   INR used for dosin.50! (2020)   Warfarin maintenance plan:   3.75 mg every Sun, Tue, Thu; 2.5 mg all other days   Weekly warfarin total:   21.25 mg   Plan last modified:   Killian Rowell RP (2020)   Next INR check:   8/3/2020   Priority:   Maintenance   Target end date:   Indefinite    Indications    Atrial fibrillation (CMS/HCC) [I48.91]             Anticoagulation Episode Summary     INR check location:       Preferred lab:       [facility]ers [facility]    [facility] [place]    Comments:         Anticoagulation Care Providers     Provider Role Specialty Phone number    Nidhi Juarez MD Referring Cardiology [phone]          INR History:  Anticoagulation Monitoring 2020   INR 2.70 1.80 1.50   INR Date 2020   INR Goal 2.0-3.0 2.0-3.0 2.0-3.0   Trend Same Same Same   Last Week Total 25 mg 18.75 mg 22.5 mg   Next Week Total 21.25 mg 22.5 mg 23.75 mg   Sun 3.75 mg 3.75 mg 3.75 mg   Mon 2.5 mg 3.75 mg () 5 mg ()   Tue 3.75 mg 3.75 mg 3.75 mg   Wed 2.5 mg 2.5 mg 2.5 mg   Thu 3.75 mg 3.75 mg 3.75 mg   Fri 2.5 mg 2.5 mg 2.5 mg   Sat 2.5 mg 2.5 mg 2.5 mg   Visit Report - - -   Some recent data might be hidden       Plan:  1. INR is Subtherapeutic today- see above in Anticoagulation Summary.   Provided instructions to Elizabeht with [facility] to boost warfarin dose today to 5mg then continue their warfarin regimen- see above in Anticoagulation Summary.  2. Follow up in 1 week  3.  Chronic non-compliance with warfarin dosing. RN has tried some changes without success and will try putting note on  this week & alerted spouse to try to help also.        Nani Rao [facility]

## 2020-08-03 ENCOUNTER — ANTICOAGULATION VISIT (OUTPATIENT)
Dept: PHARMACY | Facility: HOSPITAL | Age: 85
End: 2020-08-03

## 2020-08-03 LAB — INR PPP: 5.1

## 2020-08-03 NOTE — PROGRESS NOTES
Anticoagulation Clinic Progress Note    Anticoagulation Summary  As of 8/3/2020    INR goal:   2.0-3.0   TTR:   63.8 % (2.8 y)   INR used for dosin.10! (8/3/2020)   Warfarin maintenance plan:   3.75 mg every Sun, Tue, Thu; 2.5 mg all other days   Weekly warfarin total:   21.25 mg   Plan last modified:   Killian Rowell RPH (2020)   Next INR check:   2020   Priority:   Maintenance   Target end date:   Indefinite    Indications    Atrial fibrillation (CMS/HCC) [I48.91]             Anticoagulation Episode Summary     INR check location:       Preferred lab:       Send INR reminders to:    [facility] CLINICAL POOL    Comments:         Anticoagulation Care Providers     Provider Role Specialty Phone number    Nidhi Juarez MD Referring Cardiology [phone]        Pertinent info:  Missed dose 2 days ago reportedly. Denies s/sx of bleeding.    INR History:  Anticoagulation Monitoring 2020 2020 8/3/2020   INR 1.80 1.50 5.10   INR Date 2020 2020 8/3/2020   INR Goal 2.0-3.0 2.0-3.0 2.0-3.0   Trend Same Same Same   Last Week Total 18.75 mg 22.5 mg 21.25 mg   Next Week Total 22.5 mg 23.75 mg 17.5 mg   Sun 3.75 mg 3.75 mg -   Mon 3.75 mg () 5 mg () Hold (8/3)   Tue 3.75 mg 3.75 mg 2.5 mg ()   Wed 2.5 mg 2.5 mg 2.5 mg   Thu 3.75 mg 3.75 mg -   Fri 2.5 mg 2.5 mg -   Sat 2.5 mg 2.5 mg -   Visit Report - - -   Some recent data might be hidden       Plan:  1. INR is Supratherapeutic today- see above in Anticoagulation Summary.   Provided instructions to Rosie with [facility] to Change their warfarin regimen- see above in Anticoagulation Summary.  2. Follow up in 3 days      Killian Rowell RPH

## 2020-08-06 ENCOUNTER — ANTICOAGULATION VISIT (OUTPATIENT)
Dept: PHARMACY | Facility: HOSPITAL | Age: 85
End: 2020-08-06

## 2020-08-06 ENCOUNTER — TELEPHONE (OUTPATIENT)
Dept: CARDIOLOGY | Facility: CLINIC | Age: 85
End: 2020-08-06

## 2020-08-06 LAB — INR PPP: 2.2

## 2020-08-06 NOTE — PROGRESS NOTES
Anticoagulation Clinic Progress Note    Anticoagulation Summary  As of 2020    INR goal:   2.0-3.0   TTR:   63.7 % (2.8 y)   INR used for dosin.20 (2020)   Warfarin maintenance plan:   3.75 mg every Sun, Tue, Thu; 2.5 mg all other days   Weekly warfarin total:   21.25 mg   Plan last modified:   Killian Rowell [facility] (2020)   Next INR check:   2020   Priority:   Maintenance   Target end date:   Indefinite    Indications    Atrial fibrillation (CMS/HCC) [I48.91]             Anticoagulation Episode Summary     INR check location:       Preferred lab:       Send INR reminders to:   [facility] CLINICAL [place]    Comments:         Anticoagulation Care Providers     Provider Role Specialty Phone number    Nidhi Juarez MD Referring Cardiology [phone]          INR History:  Anticoagulation Monitoring 2020 8/3/2020 2020   INR 1.50 5.10 2.20   INR Date 2020 8/3/2020 2020   INR Goal 2.0-3.0 2.0-3.0 2.0-3.0   Trend Same Same Same   Last Week Total 22.5 mg 21.25 mg 15 mg   Next Week Total 23.75 mg 17.5 mg 21.25 mg   Sun 3.75 mg - 3.75 mg   Mon 5 mg () Hold (8/3) 2.5 mg   Tue 3.75 mg 2.5 mg () 3.75 mg   Wed 2.5 mg 2.5 mg 2.5 mg   Thu 3.75 mg - 3.75 mg   Fri 2.5 mg - 2.5 mg   Sat 2.5 mg - 2.5 mg   Visit Report - - -   Some recent data might be hidden       Plan:  1. INR is Therapeutic today- see above in Anticoagulation Summary.   Provided instructions to Rosie with [facility] to Continue their warfarin regimen- see above in Anticoagulation Summary.  2. Follow up in 1 week      Jennifer Dodd [facility]

## 2020-08-06 NOTE — TELEPHONE ENCOUNTER
Dr. Juarez    Mr. Ventura home health nurse called this morning to report a 2 week history where the patient has been having slowly increasing SOA (especially when he wakes in the morning) and lower extremity edema that she rates as 1-2+ pitting.     She notes decreased lung sounds in the left base which is new today. He wears 2lpm NC O2 and sats 99%.  His BP today is 120/68 with HR78 and this is normal for him.  He denies any CP, diaphoresis.  He denies calf pain.    Mr. Ventura is on lasix 40mg BID (he sometimes skips his night time dose. But hasn't recently). He also takes metoprolol 37.5 mg BID.      Please advise.    Thank you,  Julee Hernández RN  [facility]  Triage

## 2020-08-06 NOTE — TELEPHONE ENCOUNTER
Patient has not been seen in many months.  Please make an appointment for him to see Abiola tomorrow or early next week and increase Lasix to 60 mg in a.m. 40 mg in p.m.

## 2020-08-07 NOTE — TELEPHONE ENCOUNTER
I spoke with the patient and his [facility] nurse and gave them your instructions.  He is scheduled with Abiola on Tuesday next week.    Thank you,  Julee Hernández RN  [facility]  Triage

## 2020-08-10 NOTE — TELEPHONE ENCOUNTER
8/10/20  I called pt to remind of appt with Keysha for tomorrow - he said he didn't feel well enough to come in.  He also missed his PM appt today - same reason.  I told him that was why we needed to see him - because he's not feeling well.  He got a little upset with me and said he's 89 years old and not coming in when he feels like this.      So, we need to get him scheduled again - with both PM and Keysha - or - it appears he has [facility], ph [phone], Rosie PALOMO, maybe have them evaluate again - since he had decreased lung sounds (see this note).  Pt. Ph # [phone]/eli

## 2020-08-10 NOTE — TELEPHONE ENCOUNTER
Marely-- if patient is so ill that he is not able to make it into the office then they probably need to call EMS and be evaluated in the ER.  Otherwise I can see them tomorrow as scheduled.

## 2020-08-10 NOTE — TELEPHONE ENCOUNTER
I reached out to Mr. Ventura home health nurse.  She is going to speak with the patient and his wife and try and get him in for his appointment tomorrow.  She states that you almost always have to speak with his wife to get him to do things.    Thank you,  Julee Hernández RN  [facility]  Triage

## 2020-08-10 NOTE — TELEPHONE ENCOUNTER
Agree, little unusual for him not to make appointments.  Please have him go to the emergency room if he is feeling this week

## 2020-08-13 ENCOUNTER — ANTICOAGULATION VISIT (OUTPATIENT)
Dept: PHARMACY | Facility: HOSPITAL | Age: 85
End: 2020-08-13

## 2020-08-13 LAB — INR PPP: 1.4

## 2020-08-13 NOTE — PROGRESS NOTES
Anticoagulation Clinic Progress Note    Anticoagulation Summary  As of 2020    INR goal:   2.0-3.0   TTR:   63.4 % (2.8 y)   INR used for dosin.40! (2020)   Warfarin maintenance plan:   3.75 mg every Sun, Tue, Thu; 2.5 mg all other days   Weekly warfarin total:   21.25 mg   Plan last modified:   Killian Rowell RPH (2020)   Next INR check:   2020   Priority:   Maintenance   Target end date:   Indefinite    Indications    Atrial fibrillation (CMS/HCC) [I48.91]             Anticoagulation Episode Summary     INR check location:       Preferred lab:       Send IN[facility]ers [facility]:    [facility]    Comments:         Anticoagulation Care Providers     Provider Role Specialty Phone number    Nidhi Juarez MD Referring Cardiology [phone]        Pertinent info:  Missed 2 doses this wk.    INR History:  Anticoagulation Monitoring 8/3/2020 2020 2020   INR 5.10 2.20 1.40   INR Date 8/3/2020 2020 2020   INR Goal 2.0-3.0 2.0-3.0 2.0-3.0   Trend Same Same Same   Last Week Total 21.25 mg 15 mg 16.25 mg   Next Week Total 17.5 mg 21.25 mg 22.5 mg   Sun - 3.75 mg 3.75 mg   Mon Hold (8/3) 2.5 mg 2.5 mg   Tue 2.5 mg () 3.75 mg 3.75 mg   Wed 2.5 mg 2.5 mg 2.5 mg   Thu - 3.75 mg 3.75 mg   Fri - 2.5 mg 3.75 mg ()   Sat - 2.5 mg 2.5 mg   Visit Report - - -   Some recent data might be hidden       Plan:  1. INR is Subtherapeutic today- see above in Anticoagulation Summary.   Provided instructions to Rosie with [facility] to Change their warfarin regimen- see above in Anticoagulation Summary.  2. Follow up in 1 week      Killian Rowell RPH

## 2020-08-18 ENCOUNTER — OFFICE VISIT (OUTPATIENT)
Dept: CARDIOLOGY | Facility: CLINIC | Age: 85
End: 2020-08-18

## 2020-08-18 VITALS
HEART RATE: 72 BPM | RESPIRATION RATE: 16 BRPM | OXYGEN SATURATION: 95 % | HEIGHT: 67 IN | BODY MASS INDEX: 20.25 KG/M2 | WEIGHT: 129 LBS | DIASTOLIC BLOOD PRESSURE: 72 MMHG | SYSTOLIC BLOOD PRESSURE: 136 MMHG

## 2020-08-18 DIAGNOSIS — G45.9 TIA (TRANSIENT ISCHEMIC ATTACK): ICD-10-CM

## 2020-08-18 DIAGNOSIS — I48.19 OTHER PERSISTENT ATRIAL FIBRILLATION (HCC): Primary | ICD-10-CM

## 2020-08-18 DIAGNOSIS — Z95.0 PACEMAKER: ICD-10-CM

## 2020-08-18 DIAGNOSIS — I27.20 PULMONARY HYPERTENSION (HCC): ICD-10-CM

## 2020-08-18 DIAGNOSIS — J44.9 CHRONIC OBSTRUCTIVE PULMONARY DISEASE, UNSPECIFIED COPD TYPE (HCC): ICD-10-CM

## 2020-08-18 PROCEDURE — 93279 PRGRMG DEV EVAL PM/LDLS PM: CPT | Performed by: INTERNAL MEDICINE

## 2020-08-18 PROCEDURE — 93000 ELECTROCARDIOGRAM COMPLETE: CPT | Performed by: NURSE PRACTITIONER

## 2020-08-18 PROCEDURE — 99214 OFFICE O/P EST MOD 30 MIN: CPT | Performed by: NURSE PRACTITIONER

## 2020-08-18 NOTE — PROGRESS NOTES
[facility] CARDIOLOGY  [address]  [facility] [address]  Phone: [phone]      Patient Name: Harry Ventura  :1930  Age: 89 y.o.  Primary Cardiologist: Nidhi Juarez MD  Encounter Provider:  NARINDER Michael      Chief Complaint:   Chief Complaint   Patient presents with   • Atrial Fibrillation     Lower Extermity edmea  Last seen 2019, home health nurse set up appointment due to swelling of legs    • Pacemaker Check         HPI  Harry Ventura is a 89 y.o. male who presents today for evaluation of lower extremity edema.     Pt has a  history significant for TIA, hyperlipidemia, pulmonary hypertension, pacemaker, history of nonsustained ventricular tachycardia, CAD, CHF, atrial fibrillation.    Patient presents today for evaluation of shortness of breath.  Patient follows Dr. Grace, pulmonology.  On 2020 he underwent ultrasound-guided thoracentesis where approximately 1450 mL of clear vickie fluid was obtained.    Patient presents today with his son.  Both the patient and the son are unsure why the appointment was scheduled.  They report that the home health nurse felt like patient had lower extremity edema.  Both the patient and the son state that he has not noticed any lower extremity edema.  Patient reports that he does have chronic shortness of breath that seems to be worse in the morning and gets better by the late morning to early afternoon.  He states that this is been present for some time and has not changed and is not any worse.  Patient does report some orthopnea, reports that when he elevates his head he has easier breathing when sleeping.  He states that this is also stable and unchanged from prior.  Patient reports that he has chronic fatigue that is normal for him as well.  His blood pressure has been controlled.  He denies any lower extremity edema, abdominal bloating, lightheadedness, palpitations, dyspnea with exertion or  "chest pain.  Patient had device interrogated today where there were no events noted.      The following portions of the patient's history were reviewed and updated as appropriate: allergies, current medications, past family history, past medical history, past social history, past surgical history and problem list.      Review of Systems   Constitution: Positive for malaise/fatigue.   Cardiovascular: Positive for orthopnea. Negative for chest pain and leg swelling.   Respiratory: Positive for shortness of breath and sleep disturbances due to breathing.    Neurological: Negative for light-headedness.   All other systems reviewed and are negative.      OBJECTIVE:     Vitals:    08/18/20 1408   BP: 136/72   BP Location: Right arm   Patient Position: Sitting   Cuff Size: Adult   Pulse: 72   Resp: 16   SpO2: 95%   Weight: 58.5 kg (129 lb)   Height: 170.2 cm (67\")     Body mass index is 20.2 kg/m².    Physical Exam   Constitutional: He is oriented to person, place, and time. Vital signs are normal. He appears well-developed and well-nourished.   Eyes: Conjunctivae are normal.   Neck: Carotid bruit is not present.   Cardiovascular: Normal rate, regular rhythm and normal heart sounds.   No murmur heard.  Pulmonary/Chest: Effort normal and breath sounds normal.   Abdominal: Normal appearance.   Musculoskeletal: Normal range of motion.   No pedal edema   Neurological: He is alert and oriented to person, place, and time. GCS eye subscore is 4. GCS verbal subscore is 5. GCS motor subscore is 6.   Skin: Skin is warm and dry.   Psychiatric: He has a normal mood and affect. His speech is normal and behavior is normal. Judgment and thought content normal. Cognition and memory are normal.         ECG 12 Lead  Date/Time: 8/18/2020 2:02 PM  Performed by: Sarah Tobias APRN  Authorized by: Sarah Tobias APRN   Comparison: compared with previous ECG from 9/26/2019  Rhythm: paced  Pacing: ventricular paced " rhythm          Cardiac Procedures:  1. Myocardial perfusion stress test 9/12/2019.  No evidence of ischemia.  Impressions consistent with lower study.  LVEF 73%.  2. Echocardiogram 10/29/2019.  LVEF 52%.  Calcification of aortic valve with mild aortic valve regurgitation.  Mild aortic valve stenosis.  Calculated RVSP 64 mmHg.        ASSESSMENT:      Diagnosis Plan   1. Other persistent atrial fibrillation (CMS/HCC)     2. Chronic obstructive pulmonary disease, unspecified COPD type (CMS/HCC)     3. Pulmonary hypertension (CMS/HCC)     4. Pacemaker     5. TIA (transient ischemic attack)           PLAN OF CARE:     1. Atrial fibrillation.  No episodes on pacemaker interrogation.  Continue metoprolol tartrate 37.5 mg twice per day.  Patient is anticoagulated with Coumadin and not having any bleeding complications.  2. COPD.  Chronic but stable shortness of breath as well as orthopnea.  3. Pulmonary hypertension.  Patient appears euvolemic on exam today.  He is oxygen dependent.  He does have chronic but stable shortness of breath as well as orthopnea.  No changes from prior.  4. Pacemaker.  Interrogated today.  No events noted.  5. TIA.  Continue warfarin for anticoagulation as well as statin therapy.  6. Patient's appointment was made by home health nurse for evaluation of lower extremity edema.  I do not appreciate any lower extremity edema on exam today.  Patient states that he has not noted any swelling to his legs as well.  His son presents with him today who states that he has not noted any swelling.  7. Follow-up with  in 6 months.  Sooner for problems or complications.             Discharge Medications           Accurate as of August 18, 2020 11:59 PM. If you have any questions, ask your nurse or doctor.               Continue These Medications      Instructions Start Date   atorvastatin 40 MG tablet  Commonly known as:  LIPITOR   40 mg, Oral, Daily      EXCEDRIN PO   Oral, As Needed      furosemide 40  MG tablet  Commonly known as:  LASIX   40 mg, Oral, Daily      guaiFENesin 600 MG 12 hr tablet  Commonly known as:  MUCINEX   1,200 mg, Oral, 2 Times Daily      metoprolol tartrate 25 MG tablet  Commonly known as:  LOPRESSOR   37.5 mg, Oral, 2 Times Daily      O2  Commonly known as:  OXYGEN   2 L/min, Inhalation, Once, As needed       omeprazole 20 MG capsule  Commonly known as:  priLOSEC   20 mg, Oral, Every Morning Before Breakfast      potassium chloride 10 MEQ CR tablet  Commonly known as:  K-DUR,KLOR-CON   10 mEq, Oral, Daily, Do not crush. Take with food.      warfarin 2.5 MG tablet  Commonly known as:  Jantoven   TAKE 1.5 TABLETS BY MOUTH ON SUNDAY AND THURSDAY. TAKE 1 TABLET BY MOUTH ON OTHER DAYS OR DIRECTED. Please call the office to make a follow up appointment             Thank you for allowing me to participate in the care of your patient,         Sarah Tobias, NARINDER  [facility]  08/19/20  2:17 PM      **Ros Disclaimer:**  Much of this encounter note is an electronic transcription/translation of spoken language to printed text. The electronic translation of spoken language may permit erroneous, or at times, nonsensical words or phrases to be inadvertently transcribed. Although I have reviewed the note for such errors, some may still exist.

## 2020-08-20 ENCOUNTER — ANTICOAGULATION VISIT (OUTPATIENT)
Dept: PHARMACY | Facility: HOSPITAL | Age: 85
End: 2020-08-20

## 2020-08-20 LAB — INR PPP: 1.9

## 2020-08-20 NOTE — PROGRESS NOTES
Anticoagulation Clinic Progress Note    Anticoagulation Summary  As of 2020    INR goal:   2.0-3.0   TTR:   63.0 % (2.8 y)   INR used for dosin.90! (2020)   Warfarin maintenance plan:   3.75 mg every Sun, Tue, Thu; 2.5 mg all other days   Weekly warfarin total:   21.25 mg   Plan last modified:   Killian Rowell RPH (2020)   Next INR check:   2020   Priority:   Maintenance   Target end date:   Indefinite    Indications    Atrial fibrillation (CMS/HCC) [I48.91]             Anticoagulation Episode Summary     INR check location:       Preferred lab:       [facility] [place]    Comments:         Anticoagulation Care Providers     Provider Role Specialty Phone number    Nidhi Juarez MD Referring Cardiology [phone]          INR History:  Anticoagulation Monitoring 2020   INR 2.20 1.40 1.90   INR Date 2020   INR Goal 2.0-3.0 2.0-3.0 2.0-3.0   Trend Same Same Same   Last Week Total 15 mg 16.25 mg 22.5 mg   Next Week Total 21.25 mg 22.5 mg 21.25 mg   Sun 3.75 mg 3.75 mg 3.75 mg   Mon 2.5 mg 2.5 mg 2.5 mg   Tue 3.75 mg 3.75 mg 3.75 mg   Wed 2.5 mg 2.5 mg 2.5 mg   Thu 3.75 mg 3.75 mg 3.75 mg   Fri 2.5 mg 3.75 mg () 2.5 mg   Sat 2.5 mg 2.5 mg 2.5 mg   Visit Report - - -   Some recent data might be hidden       Plan:  1. INR is Subtherapeutic today- see above in Anticoagulation Summary.   Provided instructions to Rosie schuster  ([phone]) with [facility] to Continue their warfarin regimen- see above in Anticoagulation Summary.  2. Follow up in 1 week      Nani Rao RPH

## 2020-08-27 ENCOUNTER — ANTICOAGULATION VISIT (OUTPATIENT)
Dept: PHARMACY | Facility: HOSPITAL | Age: 85
End: 2020-08-27

## 2020-08-27 LAB — INR PPP: 1.7

## 2020-08-27 NOTE — PROGRESS NOTES
Anticoagulation Clinic Progress Note    Anticoagulation Summary  As of 2020    INR goal:   2.0-3.0   TTR:   62.6 % (2.9 y)   INR used for dosin.70! (2020)   Warfarin maintenance plan:   2.5 mg every Mon, Wed, Fri; 3.75 mg all other days   Weekly warfarin total:   22.5 mg   Plan last modified:   Killian Rowell RPH (2020)   Next INR check:   9/3/2020   Priority:   Maintenance   Target end date:   Indefinite    Indications    Atrial fibrillation (CMS/HCC) [I48.91]             Anticoagulation Episode Summary     INR check location:       Preferred lab:       Send INR reminders to:    [facility] [place]    Comments:         Anticoagulation Care Providers     Provider Role Specialty Phone number    Nidhi Juarez MD Referring Cardiology [phone]          INR History:  Anticoagulation Monitoring 2020   INR 1.40 1.90 1.70   INR Date 2020   INR Goal 2.0-3.0 2.0-3.0 2.0-3.0   Trend Same Same Up   Last Week Total 16.25 mg 22.5 mg 21.25 mg   Next Week Total 22.5 mg 21.25 mg 22.5 mg   Sun 3.75 mg 3.75 mg 3.75 mg   Mon 2.5 mg 2.5 mg 2.5 mg   Tue 3.75 mg 3.75 mg 3.75 mg   Wed 2.5 mg 2.5 mg 2.5 mg   Thu 3.75 mg 3.75 mg 3.75 mg   Fri 3.75 mg () 2.5 mg 2.5 mg   Sat 2.5 mg 2.5 mg 3.75 mg   Visit Report - - -   Some recent data might be hidden       Plan:  1. INR is Subtherapeutic today- see above in Anticoagulation Summary.   Provided instructions to Rosie with [facility] to Increase their warfarin regimen- see above in Anticoagulation Summary.  2. Follow up in 1 week      Killian Rowell RPH

## 2020-09-03 ENCOUNTER — ANTICOAGULATION VISIT (OUTPATIENT)
Dept: PHARMACY | Facility: HOSPITAL | Age: 85
End: 2020-09-03

## 2020-09-03 LAB — INR PPP: 1.2

## 2020-09-03 NOTE — PROGRESS NOTES
Anticoagulation Clinic Progress Note    Anticoagulation Summary  As of 9/3/2020    INR goal:   2.0-3.0   TTR:   62.1 % (2.9 y)   INR used for dosin.20! (9/3/2020)   Warfarin maintenance plan:   2.5 mg every Mon, Wed, Fri; 3.75 mg all other days   Weekly warfarin total:   22.5 mg   Plan last modified:   Killian Rowell RPH (2020)   Next INR check:   9/10/2020   Priority:   Maintenance   Target end date:   Indefinite    Indications    Atrial fibrillation (CMS/HCC) [I48.91]             Anticoagulation Episode Summary     INR check location:       Preferred lab:       Send INR reminders to:    [facility]    Comments:         Anticoagulation Care Providers     Provider Role Specialty Phone number    Nidhi Juarez MD Referring Cardiology [phone]          Clinic Interview:  Patient Findings     Positives:   Missed doses    Negatives:   Signs/symptoms of thrombosis, Signs/symptoms of bleeding,   Laboratory test error suspected, Change in health, Change in alcohol use,   Change in activity, Upcoming invasive procedure, Emergency department   visit, Upcoming dental procedure, Extra doses, Change in medications,   Change in diet/appetite, Hospital admission, Bruising, Other complaints    Comments:   Home Health RN mentioned that although pt didn't report   missing any doses, he does not remember and has been unreliable in taking   warfarin consistently. Confirmed that patient likely missed one or two   doses.       Clinical Outcomes     Negatives:   Major bleeding event, Thromboembolic event,   Anticoagulation-related hospital admission, Anticoagulation-related ED   visit, Anticoagulation-related fatality    Comments:   Home Health RN mentioned that although pt didn't report   missing any doses, he does not remember and has been unreliable in taking   warfarin consistently. Confirmed that patient likely missed one or two   doses.         INR History:  Anticoagulation Monitoring 2020  8/27/2020 9/3/2020   INR 1.90 1.70 1.20   INR Date 8/20/2020 8/27/2020 9/3/2020   INR Goal 2.0-3.0 2.0-3.0 2.0-3.0   Trend Same Up Same   Last Week Total 22.5 mg 21.25 mg 22.5 mg   Next Week Total 21.25 mg 22.5 mg 26.25 mg   Sun 3.75 mg 3.75 mg 3.75 mg   Mon 2.5 mg 2.5 mg 2.5 mg   Tue 3.75 mg 3.75 mg 3.75 mg   Wed 2.5 mg 2.5 mg 2.5 mg   Thu 3.75 mg 3.75 mg 5 mg (9/3)   Fri 2.5 mg 2.5 mg 5 mg (9/4)   Sat 2.5 mg 3.75 mg 3.75 mg   Visit Report - - -   Some recent data might be hidden       Plan:  1. INR is Subtherapeutic today- see above in Anticoagulation Summary.   Will instruct Harry Ventura to Increase their warfarin regimen and give 5 mg on 9/3 and 9/4; then continue the original regimen- see above in Anticoagulation Summary.  2. Follow up in 1 week  3.  Pt has agreed to only be called if INR out of range. They have been instructed to call if any changes in medications, doses, concerns, etc. Patient expresses understanding and has no further questions at this time.    Gamaliel Salas [facility]

## 2020-09-16 ENCOUNTER — ANTICOAGULATION VISIT (OUTPATIENT)
Dept: PHARMACY | Facility: HOSPITAL | Age: 85
End: 2020-09-16

## 2020-09-16 LAB — INR PPP: 1.3

## 2020-09-16 NOTE — PROGRESS NOTES
Anticoagulation Clinic Progress Note    Anticoagulation Summary  As of 2020    INR goal:  2.0-3.0   TTR:  61.4 % (2.9 y)   INR used for dosin.30 (2020)   Warfarin maintenance plan:  2.5 mg every Mon, Wed, Fri; 3.75 mg all other days   Weekly warfarin total:  22.5 mg   Plan last modified:  Killian Rowell RPH (2020)   Next INR check:  2020   Priority:  Maintenance   Target end date:  Indefinite    Indications    Atrial fibrillation (CMS/HCC) [I48.91]             Anticoagulation Episode Summary     INR check location:      Preferred lab:      Send INR reminders to:   [facility]    Comments:        Anticoagulation Care Providers     Provider Role Specialty Phone number    Nidhi Juarez MD Referring Cardiology [phone]        Pertinent info:  Nurse reports pt refused another nurse to come last wk while she was on vacation. Missed doses, but unsure how many or when. Using pill planner; wife manages, but she does not confirm he takes his doses.    INR History:  Anticoagulation Monitoring 2020 9/3/2020 2020   INR 1.70 1.20 1.30   INR Date 2020 9/3/2020 2020   INR Goal 2.0-3.0 2.0-3.0 2.0-3.0   Trend Up Same Same   Last Week Total 21.25 mg 22.5 mg 22.5 mg   Next Week Total 22.5 mg 26.25 mg 26.25 mg   Sun 3.75 mg 3.75 mg 3.75 mg   Mon 2.5 mg 2.5 mg 2.5 mg   Tue 3.75 mg 3.75 mg 3.75 mg   Wed 2.5 mg 2.5 mg 5 mg ()   Thu 3.75 mg 5 mg (9/3) 5 mg ()   Fri 2.5 mg 5 mg () 2.5 mg   Sat 3.75 mg 3.75 mg 3.75 mg   Visit Report - - -   Some recent data might be hidden       Plan:  1. INR is Subtherapeutic today- see above in Anticoagulation Summary.   Provided instructions to Rosie with [facility] to Change their warfarin regimen- see above in Anticoagulation Summary.  2. Follow up in 1 week      Killian Rowell RPH

## 2020-10-02 ENCOUNTER — ANTICOAGULATION VISIT (OUTPATIENT)
Dept: PHARMACY | Facility: HOSPITAL | Age: 85
End: 2020-10-02

## 2020-10-02 NOTE — PROGRESS NOTES
Spoke with Rosie HENRIQUEZ  nurse and she reported that pt is .  This visit is for documentation purposes only.

## 2022-03-16 NOTE — PROGRESS NOTES
Anticoagulation Clinic Progress Note    Anticoagulation Summary  As of 2020    INR goal:   2.0-3.0   TTR:   70.9 % (2.2 y)   INR used for dosin.10 (2020)   Warfarin maintenance plan:   5 mg every Mon, Fri; 3.75 mg all other days   Weekly warfarin total:   28.75 mg   Plan last modified:   Ady Anderson [facility] (2019)   Next INR check:   2020   Priority:   Maintenance   Target end date:   Indefinite    Indications    Atrial fibrillation (CMS/HCC) [I48.91]             Anticoagulation Episode Summary     INR check location:       Preferred lab:       Send INR reminders to:   [facility]    Comments:         Anticoagulation Care Providers     Provider Role Specialty Phone number    Nidhi Juarez MD Referring Cardiology [phone]          INR History:  Anticoagulation Monitoring 2019   INR 1.5 1.7 2.10   INR Date 2019   INR Goal 2.0-3.0 2.0-3.0 2.0-3.0   Trend Same Same Same   Last Week Total 22.5 mg 20 mg 28.75 mg   Next Week Total 23.75 mg 27.5 mg 28.75 mg   Sun 2.5 mg (12/15) 3.75 mg 3.75 mg   Mon - 2.5 mg () 5 mg   Tue - 5 mg () 3.75 mg   Wed - 3.75 mg 3.75 mg   Thu - 3.75 mg 3.75 mg   Fri 5 mg 5 mg 5 mg   Sat 2.5 mg () 3.75 mg 3.75 mg   Visit Report - - -   Some recent data might be hidden       Plan:  1. INR is Therapeutic today- see above in Anticoagulation Summary.   Provided instructions to Rsoie with [facility] to Continue their warfarin regimen- see above in Anticoagulation Summary.  2. Follow up in 1 week or sooner if patient starts antibiotic.      Nani Rao [facility]  
yes

## (undated) DEVICE — Device

## (undated) DEVICE — TOTAL TRAY, 16FR 10ML SIL FOLEY, URN: Brand: MEDLINE

## (undated) DEVICE — LOU THORACIC: Brand: MEDLINE INDUSTRIES, INC.

## (undated) DEVICE — ENCORE® LATEX ORTHO SIZE 8, STERILE LATEX POWDER-FREE SURGICAL GLOVE: Brand: ENCORE

## (undated) DEVICE — OASIS DRAIN, SINGLE, INLINE & ATS COMPATIBLE: Brand: OASIS

## (undated) DEVICE — APPL DURAPREP IODOPHOR APL 26ML

## (undated) DEVICE — SUT SILK 0 PSL 18IN 580H

## (undated) DEVICE — DRN WND CH RND FUL/FLUT NO/TROC 3/8IN 28F

## (undated) DEVICE — VISUALIZATION SYSTEM: Brand: CLEARIFY

## (undated) DEVICE — SYS PERFUS SEP PLATLT W TIPS CUST

## (undated) DEVICE — BLAKE CARDIO CONNECTOR 1:1: Brand: J-VAC

## (undated) DEVICE — TAPE MICROFM 2IN LF

## (undated) DEVICE — 3M™ STERI-STRIP™ COMPOUND BENZOIN TINCTURE 40 BAGS/CARTON 4 CARTONS/CASE C1544: Brand: 3M™ STERI-STRIP™

## (undated) DEVICE — GOWN,SLEEVE,STERILE,W/CSR WRAP,1/P: Brand: MEDLINE

## (undated) DEVICE — PROB CRYO ABL ICE MALL LSS BEND 10CM

## (undated) DEVICE — 28 FR STRAIGHT – SOFT PVC CATHETER: Brand: PVC THORACIC CATHETERS

## (undated) DEVICE — 3M™ STERI-STRIP™ REINFORCED ADHESIVE SKIN CLOSURES, R1547, 1/2 IN X 4 IN (12 MM X 100 MM), 6 STRIPS/ENVELOPE: Brand: 3M™ STERI-STRIP™

## (undated) DEVICE — ANTIBACTERIAL UNDYED BRAIDED (POLYGLACTIN 910), SYNTHETIC ABSORBABLE SUTURE: Brand: COATED VICRYL

## (undated) DEVICE — CONTAINER,SPECIMEN,OR STERILE,4OZ: Brand: MEDLINE

## (undated) DEVICE — SPNG GZ WOVN 4X4IN 12PLY 10/BX STRL

## (undated) DEVICE — GOWN,NON-REINFORCED,SIRUS,SET IN SLV,XL: Brand: MEDLINE

## (undated) DEVICE — WOUND RETRACTOR AND PROTECTOR: Brand: ALEXIS WOUND PROTECTOR-RETRACTOR

## (undated) DEVICE — ENDOPATH 10MM ENDOSCOPIC BLUNT CHERRY DISSECTORS (12 POUCHES CONTAINING 3 DISSECTORS EACH): Brand: ENDOPATH